# Patient Record
Sex: FEMALE | Race: WHITE | Employment: OTHER | URBAN - NONMETROPOLITAN AREA
[De-identification: names, ages, dates, MRNs, and addresses within clinical notes are randomized per-mention and may not be internally consistent; named-entity substitution may affect disease eponyms.]

---

## 2017-01-09 ENCOUNTER — OFFICE VISIT (OUTPATIENT)
Dept: FAMILY MEDICINE CLINIC | Age: 82
End: 2017-01-09

## 2017-01-09 ENCOUNTER — CARE COORDINATOR VISIT (OUTPATIENT)
Dept: FAMILY MEDICINE CLINIC | Age: 82
End: 2017-01-09

## 2017-01-09 VITALS
BODY MASS INDEX: 35.49 KG/M2 | DIASTOLIC BLOOD PRESSURE: 80 MMHG | WEIGHT: 213 LBS | HEART RATE: 95 BPM | OXYGEN SATURATION: 97 % | SYSTOLIC BLOOD PRESSURE: 128 MMHG | HEIGHT: 65 IN

## 2017-01-09 DIAGNOSIS — G62.0 CHEMOTHERAPY-INDUCED NEUROPATHY (HCC): ICD-10-CM

## 2017-01-09 DIAGNOSIS — R73.9 HYPERGLYCEMIA: ICD-10-CM

## 2017-01-09 DIAGNOSIS — D89.89: ICD-10-CM

## 2017-01-09 DIAGNOSIS — T45.1X5A CHEMOTHERAPY-INDUCED NEUROPATHY (HCC): ICD-10-CM

## 2017-01-09 DIAGNOSIS — J44.9 CHRONIC OBSTRUCTIVE PULMONARY DISEASE, UNSPECIFIED COPD TYPE (HCC): ICD-10-CM

## 2017-01-09 DIAGNOSIS — Z78.0 POSTMENOPAUSAL: ICD-10-CM

## 2017-01-09 DIAGNOSIS — D80.1 HYPOGAMMAGLOBULINEMIA, ACQUIRED (HCC): ICD-10-CM

## 2017-01-09 DIAGNOSIS — L57.0 ACTINIC KERATOSES: ICD-10-CM

## 2017-01-09 DIAGNOSIS — I10 ESSENTIAL HYPERTENSION: Primary | ICD-10-CM

## 2017-01-09 DIAGNOSIS — I48.0 PAROXYSMAL ATRIAL FIBRILLATION (HCC): ICD-10-CM

## 2017-01-09 PROCEDURE — 99213 OFFICE O/P EST LOW 20 MIN: CPT | Performed by: FAMILY MEDICINE

## 2017-01-09 PROCEDURE — 17110 DESTRUCTION B9 LES UP TO 14: CPT | Performed by: FAMILY MEDICINE

## 2017-01-09 ASSESSMENT — ENCOUNTER SYMPTOMS
ABDOMINAL PAIN: 0
SHORTNESS OF BREATH: 0

## 2017-01-20 DIAGNOSIS — I10 ESSENTIAL HYPERTENSION: ICD-10-CM

## 2017-01-20 DIAGNOSIS — R73.9 HYPERGLYCEMIA: ICD-10-CM

## 2017-01-20 LAB
ALT SERPL-CCNC: 12 U/L (ref 0–33)
AST SERPL-CCNC: 19 U/L (ref 0–35)
BASOPHILS ABSOLUTE: 0.1 K/UL (ref 0–0.2)
BASOPHILS RELATIVE PERCENT: 0.9 %
CHOLESTEROL, TOTAL: 245 MG/DL (ref 0–199)
EOSINOPHILS ABSOLUTE: 0.1 K/UL (ref 0–0.7)
EOSINOPHILS RELATIVE PERCENT: 1 %
HBA1C MFR BLD: 6 % (ref 4.8–5.9)
HCT VFR BLD CALC: 48.2 % (ref 37–47)
HDLC SERPL-MCNC: 48 MG/DL (ref 40–59)
HEMOGLOBIN: 15.2 G/DL (ref 12–16)
LDL CHOLESTEROL CALCULATED: 147 MG/DL (ref 0–129)
LYMPHOCYTES ABSOLUTE: 2.6 K/UL (ref 1–4.8)
LYMPHOCYTES RELATIVE PERCENT: 23.1 %
MCH RBC QN AUTO: 24.2 PG (ref 27–31.3)
MCHC RBC AUTO-ENTMCNC: 31.5 % (ref 33–37)
MCV RBC AUTO: 76.6 FL (ref 82–100)
MONOCYTES ABSOLUTE: 0.8 K/UL (ref 0.2–0.8)
MONOCYTES RELATIVE PERCENT: 7.4 %
NEUTROPHILS ABSOLUTE: 7.5 K/UL (ref 1.4–6.5)
NEUTROPHILS RELATIVE PERCENT: 67.6 %
PDW BLD-RTO: 17.8 % (ref 11.5–14.5)
PLATELET # BLD: 243 K/UL (ref 130–400)
RBC # BLD: 6.3 M/UL (ref 4.2–5.4)
TRIGL SERPL-MCNC: 248 MG/DL (ref 0–200)
WBC # BLD: 11.1 K/UL (ref 4.8–10.8)

## 2017-01-25 ENCOUNTER — HOSPITAL ENCOUNTER (OUTPATIENT)
Dept: WOMENS IMAGING | Age: 82
Discharge: HOME | End: 2017-01-25
Payer: MEDICARE

## 2017-01-25 DIAGNOSIS — Z78.0 POSTMENOPAUSAL: ICD-10-CM

## 2017-01-25 PROCEDURE — 77080 DXA BONE DENSITY AXIAL: CPT

## 2017-02-06 ENCOUNTER — CARE COORDINATION (OUTPATIENT)
Dept: FAMILY MEDICINE CLINIC | Age: 82
End: 2017-02-06

## 2017-03-16 ENCOUNTER — TELEPHONE (OUTPATIENT)
Dept: FAMILY MEDICINE CLINIC | Age: 82
End: 2017-03-16

## 2017-03-16 ENCOUNTER — APPOINTMENT (OUTPATIENT)
Dept: GENERAL RADIOLOGY | Age: 82
End: 2017-03-16
Payer: MEDICARE

## 2017-03-16 ENCOUNTER — APPOINTMENT (OUTPATIENT)
Dept: ULTRASOUND IMAGING | Age: 82
End: 2017-03-16
Payer: MEDICARE

## 2017-03-16 ENCOUNTER — HOSPITAL ENCOUNTER (EMERGENCY)
Age: 82
Discharge: HOME | End: 2017-03-16
Payer: MEDICARE

## 2017-03-16 VITALS
RESPIRATION RATE: 18 BRPM | OXYGEN SATURATION: 98 % | HEART RATE: 91 BPM | SYSTOLIC BLOOD PRESSURE: 121 MMHG | DIASTOLIC BLOOD PRESSURE: 63 MMHG | TEMPERATURE: 98 F

## 2017-03-16 DIAGNOSIS — M16.12 OSTEOARTHRITIS OF LEFT HIP, UNSPECIFIED OSTEOARTHRITIS TYPE: ICD-10-CM

## 2017-03-16 DIAGNOSIS — M25.552 HIP PAIN, ACUTE, LEFT: Primary | ICD-10-CM

## 2017-03-16 PROCEDURE — 96372 THER/PROPH/DIAG INJ SC/IM: CPT

## 2017-03-16 PROCEDURE — 93971 EXTREMITY STUDY: CPT

## 2017-03-16 PROCEDURE — 99284 EMERGENCY DEPT VISIT MOD MDM: CPT

## 2017-03-16 PROCEDURE — 73502 X-RAY EXAM HIP UNI 2-3 VIEWS: CPT

## 2017-03-16 PROCEDURE — 6360000002 HC RX W HCPCS: Performed by: PHYSICIAN ASSISTANT

## 2017-03-16 PROCEDURE — 72110 X-RAY EXAM L-2 SPINE 4/>VWS: CPT

## 2017-03-16 PROCEDURE — 6370000000 HC RX 637 (ALT 250 FOR IP): Performed by: PHYSICIAN ASSISTANT

## 2017-03-16 RX ORDER — OXYCODONE HYDROCHLORIDE AND ACETAMINOPHEN 5; 325 MG/1; MG/1
1 TABLET ORAL ONCE
Status: COMPLETED | OUTPATIENT
Start: 2017-03-16 | End: 2017-03-16

## 2017-03-16 RX ORDER — PREDNISONE 50 MG/1
50 TABLET ORAL DAILY
Qty: 5 TABLET | Refills: 0 | Status: SHIPPED | OUTPATIENT
Start: 2017-03-16 | End: 2017-03-21

## 2017-03-16 RX ORDER — DEXAMETHASONE SODIUM PHOSPHATE 10 MG/ML
10 INJECTION, SOLUTION INTRAMUSCULAR; INTRAVENOUS ONCE
Status: COMPLETED | OUTPATIENT
Start: 2017-03-16 | End: 2017-03-16

## 2017-03-16 RX ORDER — ONDANSETRON 2 MG/ML
4 INJECTION INTRAMUSCULAR; INTRAVENOUS ONCE
Status: DISCONTINUED | OUTPATIENT
Start: 2017-03-16 | End: 2017-03-16

## 2017-03-16 RX ADMIN — OXYCODONE HYDROCHLORIDE AND ACETAMINOPHEN 1 TABLET: 5; 325 TABLET ORAL at 19:56

## 2017-03-16 RX ADMIN — DEXAMETHASONE SODIUM PHOSPHATE 10 MG: 10 INJECTION INTRAMUSCULAR; INTRAVENOUS at 19:56

## 2017-03-16 ASSESSMENT — ENCOUNTER SYMPTOMS
CHOKING: 0
CONSTIPATION: 0
SORE THROAT: 0
EYE DISCHARGE: 0
DIARRHEA: 0
VOMITING: 0
SHORTNESS OF BREATH: 0
BACK PAIN: 1
ABDOMINAL DISTENTION: 0
COLOR CHANGE: 0
ABDOMINAL PAIN: 0
COUGH: 0
NAUSEA: 0
RHINORRHEA: 0

## 2017-03-16 ASSESSMENT — PAIN DESCRIPTION - LOCATION: LOCATION: HIP

## 2017-03-16 ASSESSMENT — PAIN DESCRIPTION - ORIENTATION: ORIENTATION: LEFT

## 2017-03-16 ASSESSMENT — PAIN DESCRIPTION - PROGRESSION: CLINICAL_PROGRESSION: GRADUALLY WORSENING

## 2017-03-16 ASSESSMENT — PAIN DESCRIPTION - PAIN TYPE: TYPE: ACUTE PAIN

## 2017-03-16 ASSESSMENT — PAIN SCALES - GENERAL
PAINLEVEL_OUTOF10: 10
PAINLEVEL_OUTOF10: 10

## 2017-03-16 ASSESSMENT — PAIN DESCRIPTION - DESCRIPTORS: DESCRIPTORS: ACHING

## 2017-03-20 ENCOUNTER — CARE COORDINATION (OUTPATIENT)
Dept: FAMILY MEDICINE CLINIC | Age: 82
End: 2017-03-20

## 2017-03-20 DIAGNOSIS — L30.4 INTERTRIGO: ICD-10-CM

## 2017-03-20 RX ORDER — NYSTATIN 100000 [USP'U]/G
POWDER TOPICAL
Qty: 120 G | Refills: 3 | Status: SHIPPED | OUTPATIENT
Start: 2017-03-20 | End: 2017-03-30

## 2017-03-23 ENCOUNTER — CARE COORDINATION (OUTPATIENT)
Dept: FAMILY MEDICINE CLINIC | Age: 82
End: 2017-03-23

## 2017-03-30 ENCOUNTER — OFFICE VISIT (OUTPATIENT)
Dept: FAMILY MEDICINE CLINIC | Age: 82
End: 2017-03-30

## 2017-03-30 VITALS
DIASTOLIC BLOOD PRESSURE: 84 MMHG | HEIGHT: 65 IN | OXYGEN SATURATION: 96 % | BODY MASS INDEX: 34.82 KG/M2 | WEIGHT: 209 LBS | HEART RATE: 56 BPM | SYSTOLIC BLOOD PRESSURE: 120 MMHG

## 2017-03-30 DIAGNOSIS — J44.1 COPD EXACERBATION (HCC): ICD-10-CM

## 2017-03-30 DIAGNOSIS — J18.9 PNEUMONIA DUE TO ORGANISM: Primary | ICD-10-CM

## 2017-03-30 PROCEDURE — 99213 OFFICE O/P EST LOW 20 MIN: CPT | Performed by: FAMILY MEDICINE

## 2017-03-30 RX ORDER — ALPRAZOLAM 0.25 MG/1
0.25 TABLET ORAL
Status: CANCELLED | OUTPATIENT
Start: 2017-03-30

## 2017-03-30 RX ORDER — METHYLPREDNISOLONE 4 MG/1
TABLET ORAL
Qty: 1 KIT | Refills: 0 | Status: SHIPPED | OUTPATIENT
Start: 2017-03-30 | End: 2017-04-13

## 2017-03-30 RX ORDER — CLARITHROMYCIN 500 MG/1
500 TABLET, COATED ORAL 2 TIMES DAILY
Qty: 14 TABLET | Refills: 0 | Status: SHIPPED | OUTPATIENT
Start: 2017-03-30 | End: 2017-04-06

## 2017-04-03 ENCOUNTER — CARE COORDINATION (OUTPATIENT)
Dept: FAMILY MEDICINE CLINIC | Age: 82
End: 2017-04-03

## 2017-04-08 DIAGNOSIS — J20.9 ACUTE BRONCHITIS, UNSPECIFIED ORGANISM: ICD-10-CM

## 2017-04-11 RX ORDER — PROMETHAZINE HYDROCHLORIDE AND CODEINE PHOSPHATE 6.25; 1 MG/5ML; MG/5ML
SYRUP ORAL
Qty: 200 ML | Refills: 0 | Status: ON HOLD | OUTPATIENT
Start: 2017-04-11 | End: 2017-12-08 | Stop reason: HOSPADM

## 2017-04-13 ENCOUNTER — CARE COORDINATOR VISIT (OUTPATIENT)
Dept: FAMILY MEDICINE CLINIC | Age: 82
End: 2017-04-13

## 2017-04-13 ENCOUNTER — OFFICE VISIT (OUTPATIENT)
Dept: FAMILY MEDICINE CLINIC | Age: 82
End: 2017-04-13

## 2017-04-13 VITALS
TEMPERATURE: 98.1 F | SYSTOLIC BLOOD PRESSURE: 120 MMHG | HEIGHT: 65 IN | DIASTOLIC BLOOD PRESSURE: 60 MMHG | HEART RATE: 86 BPM | WEIGHT: 216.8 LBS | BODY MASS INDEX: 36.12 KG/M2 | OXYGEN SATURATION: 92 %

## 2017-04-13 DIAGNOSIS — Z23 NEED FOR PNEUMOCOCCAL VACCINATION: ICD-10-CM

## 2017-04-13 DIAGNOSIS — J44.9 CHRONIC OBSTRUCTIVE PULMONARY DISEASE, UNSPECIFIED COPD TYPE (HCC): Primary | ICD-10-CM

## 2017-04-13 PROCEDURE — 99213 OFFICE O/P EST LOW 20 MIN: CPT | Performed by: FAMILY MEDICINE

## 2017-04-13 PROCEDURE — G0009 ADMIN PNEUMOCOCCAL VACCINE: HCPCS | Performed by: FAMILY MEDICINE

## 2017-04-13 PROCEDURE — 90670 PCV13 VACCINE IM: CPT | Performed by: FAMILY MEDICINE

## 2017-04-13 RX ORDER — GUAIFENESIN AND DEXTROMETHORPHAN HYDROBROMIDE 600; 30 MG/1; MG/1
1 TABLET, EXTENDED RELEASE ORAL 2 TIMES DAILY
Qty: 28 TABLET | Refills: 1 | Status: ON HOLD | OUTPATIENT
Start: 2017-04-13 | End: 2017-12-08 | Stop reason: HOSPADM

## 2017-04-13 ASSESSMENT — ENCOUNTER SYMPTOMS
SHORTNESS OF BREATH: 0
ABDOMINAL PAIN: 0

## 2017-04-24 DIAGNOSIS — J20.9 ACUTE BRONCHITIS, UNSPECIFIED ORGANISM: ICD-10-CM

## 2017-04-24 RX ORDER — PROMETHAZINE HYDROCHLORIDE AND CODEINE PHOSPHATE 6.25; 1 MG/5ML; MG/5ML
SYRUP ORAL
Qty: 180 ML | Refills: 0 | Status: CANCELLED | OUTPATIENT
Start: 2017-04-24

## 2017-04-26 RX ORDER — GUAIFENESIN AND DEXTROMETHORPHAN HYDROBROMIDE 600; 30 MG/1; MG/1
1 TABLET, EXTENDED RELEASE ORAL 2 TIMES DAILY PRN
Qty: 28 TABLET | Refills: 1 | Status: SHIPPED | OUTPATIENT
Start: 2017-04-26 | End: 2019-01-01

## 2017-05-30 RX ORDER — FUROSEMIDE 20 MG/1
TABLET ORAL
Qty: 28 TABLET | Refills: 0 | Status: SHIPPED | OUTPATIENT
Start: 2017-05-30 | End: 2017-05-30 | Stop reason: SDUPTHER

## 2017-05-30 RX ORDER — FUROSEMIDE 20 MG/1
TABLET ORAL
Qty: 180 TABLET | Refills: 3 | Status: SHIPPED | OUTPATIENT
Start: 2017-05-30 | End: 2017-06-14

## 2017-05-30 RX ORDER — FUROSEMIDE 20 MG/1
TABLET ORAL
Qty: 14 TABLET | Refills: 0 | Status: SHIPPED | OUTPATIENT
Start: 2017-05-30 | End: 2017-05-30

## 2017-06-21 RX ORDER — ESOMEPRAZOLE MAGNESIUM 40 MG/1
CAPSULE, DELAYED RELEASE ORAL
Qty: 30 CAPSULE | Refills: 11 | Status: SHIPPED | OUTPATIENT
Start: 2017-06-21 | End: 2017-08-10 | Stop reason: SDUPTHER

## 2017-06-27 RX ORDER — RIVAROXABAN 20 MG/1
TABLET, FILM COATED ORAL
Qty: 90 TABLET | Refills: 0 | Status: SHIPPED | OUTPATIENT
Start: 2017-06-27 | End: 2017-11-07 | Stop reason: SDUPTHER

## 2017-07-05 ENCOUNTER — CARE COORDINATION (OUTPATIENT)
Dept: FAMILY MEDICINE CLINIC | Age: 82
End: 2017-07-05

## 2017-08-08 ENCOUNTER — CARE COORDINATION (OUTPATIENT)
Dept: FAMILY MEDICINE CLINIC | Age: 82
End: 2017-08-08

## 2017-08-10 ENCOUNTER — OFFICE VISIT (OUTPATIENT)
Dept: FAMILY MEDICINE CLINIC | Age: 82
End: 2017-08-10

## 2017-08-10 VITALS
OXYGEN SATURATION: 95 % | BODY MASS INDEX: 36.32 KG/M2 | HEART RATE: 80 BPM | SYSTOLIC BLOOD PRESSURE: 138 MMHG | HEIGHT: 65 IN | DIASTOLIC BLOOD PRESSURE: 72 MMHG | WEIGHT: 218 LBS

## 2017-08-10 DIAGNOSIS — R73.9 HYPERGLYCEMIA: ICD-10-CM

## 2017-08-10 DIAGNOSIS — I10 ESSENTIAL HYPERTENSION: Primary | ICD-10-CM

## 2017-08-10 DIAGNOSIS — R73.03 PREDIABETES: ICD-10-CM

## 2017-08-10 DIAGNOSIS — K21.9 GASTROESOPHAGEAL REFLUX DISEASE, ESOPHAGITIS PRESENCE NOT SPECIFIED: ICD-10-CM

## 2017-08-10 DIAGNOSIS — J44.9 CHRONIC OBSTRUCTIVE PULMONARY DISEASE, UNSPECIFIED COPD TYPE (HCC): ICD-10-CM

## 2017-08-10 DIAGNOSIS — L30.4 INTERTRIGO: ICD-10-CM

## 2017-08-10 DIAGNOSIS — M67.911 TENDINOPATHY OF RIGHT SHOULDER: ICD-10-CM

## 2017-08-10 DIAGNOSIS — M47.817 OSTEOARTHRITIS OF LUMBOSACRAL SPINE WITHOUT MYELOPATHY: ICD-10-CM

## 2017-08-10 PROBLEM — E11.9 DIABETES (HCC): Status: ACTIVE | Noted: 2017-08-01

## 2017-08-10 LAB — HBA1C MFR BLD: 7.2 % (ref 4.8–5.9)

## 2017-08-10 PROCEDURE — 99213 OFFICE O/P EST LOW 20 MIN: CPT | Performed by: FAMILY MEDICINE

## 2017-08-10 RX ORDER — NYSTATIN 100000 U/G
CREAM TOPICAL
Qty: 45 G | Refills: 5 | Status: SHIPPED | OUTPATIENT
Start: 2017-08-10 | End: 2018-02-01 | Stop reason: ALTCHOICE

## 2017-08-10 RX ORDER — ESOMEPRAZOLE MAGNESIUM 40 MG/1
CAPSULE, DELAYED RELEASE ORAL
Qty: 30 CAPSULE | Refills: 11 | Status: SHIPPED | OUTPATIENT
Start: 2017-08-10 | End: 2018-08-29 | Stop reason: SDUPTHER

## 2017-08-10 ASSESSMENT — ENCOUNTER SYMPTOMS
ABDOMINAL PAIN: 0
SHORTNESS OF BREATH: 0

## 2017-08-12 ENCOUNTER — TELEPHONE (OUTPATIENT)
Dept: FAMILY MEDICINE CLINIC | Age: 82
End: 2017-08-12

## 2017-08-28 ENCOUNTER — TELEPHONE (OUTPATIENT)
Dept: FAMILY MEDICINE CLINIC | Age: 82
End: 2017-08-28

## 2017-08-30 ENCOUNTER — TELEPHONE (OUTPATIENT)
Dept: CARDIOLOGY | Age: 82
End: 2017-08-30

## 2017-09-06 ENCOUNTER — CARE COORDINATION (OUTPATIENT)
Dept: FAMILY MEDICINE CLINIC | Age: 82
End: 2017-09-06

## 2017-11-20 ENCOUNTER — CARE COORDINATION (OUTPATIENT)
Dept: CARE COORDINATION | Age: 82
End: 2017-11-20

## 2017-11-30 ENCOUNTER — TELEPHONE (OUTPATIENT)
Dept: FAMILY MEDICINE CLINIC | Age: 82
End: 2017-11-30

## 2017-11-30 DIAGNOSIS — F51.01 PRIMARY INSOMNIA: Primary | ICD-10-CM

## 2017-11-30 RX ORDER — CLONAZEPAM 0.5 MG/1
0.5 TABLET ORAL NIGHTLY PRN
Qty: 30 TABLET | Refills: 0 | Status: SHIPPED | OUTPATIENT
Start: 2017-11-30 | End: 2018-01-26 | Stop reason: SDUPTHER

## 2017-11-30 NOTE — TELEPHONE ENCOUNTER
Pt was in palliative care was written a script for clonazepam 0.5 mg tab quant 30 one tab at bedtime daily in place of the [place]. Pt is no longer in palliative care pt is in need of refill, has an appointment with you on 12/12/17 would like just enough to get her to her appointment to discuss this with you. Please advise please send GE/V.

## 2017-12-06 ENCOUNTER — APPOINTMENT (OUTPATIENT)
Dept: GENERAL RADIOLOGY | Age: 82
DRG: 195 | End: 2017-12-06
Payer: MEDICARE

## 2017-12-06 ENCOUNTER — HOSPITAL ENCOUNTER (INPATIENT)
Age: 82
LOS: 1 days | Discharge: HOME HEALTH | DRG: 195 | End: 2017-12-08
Attending: INTERNAL MEDICINE | Admitting: INTERNAL MEDICINE
Payer: MEDICARE

## 2017-12-06 DIAGNOSIS — J69.0: ICD-10-CM

## 2017-12-06 DIAGNOSIS — D72.829 LEUKOCYTOSIS, UNSPECIFIED TYPE: ICD-10-CM

## 2017-12-06 DIAGNOSIS — J44.1 COPD EXACERBATION (HCC): Primary | ICD-10-CM

## 2017-12-06 PROBLEM — J18.9 PNEUMONIA: Status: ACTIVE | Noted: 2017-12-06

## 2017-12-06 LAB
ALBUMIN SERPL-MCNC: 3.3 G/DL (ref 3.9–4.9)
ALP BLD-CCNC: 85 U/L (ref 40–130)
ALT SERPL-CCNC: 12 U/L (ref 0–33)
ANION GAP SERPL CALCULATED.3IONS-SCNC: 14 MEQ/L (ref 7–13)
AST SERPL-CCNC: 18 U/L (ref 0–35)
BASOPHILS ABSOLUTE: 0.1 K/UL (ref 0–0.2)
BASOPHILS RELATIVE PERCENT: 0.5 %
BILIRUB SERPL-MCNC: 0.8 MG/DL (ref 0–1.2)
BUN BLDV-MCNC: 10 MG/DL (ref 8–23)
CALCIUM SERPL-MCNC: 8.4 MG/DL (ref 8.6–10.2)
CHLORIDE BLD-SCNC: 95 MEQ/L (ref 98–107)
CO2: 24 MEQ/L (ref 22–29)
CREAT SERPL-MCNC: 0.72 MG/DL (ref 0.5–0.9)
EKG ATRIAL RATE: 72 BPM
EKG Q-T INTERVAL: 368 MS
EKG QRS DURATION: 78 MS
EKG QTC CALCULATION (BAZETT): 440 MS
EKG R AXIS: 8 DEGREES
EKG T AXIS: -12 DEGREES
EKG VENTRICULAR RATE: 86 BPM
EOSINOPHILS ABSOLUTE: 0 K/UL (ref 0–0.7)
EOSINOPHILS RELATIVE PERCENT: 0 %
GFR AFRICAN AMERICAN: >60
GFR NON-AFRICAN AMERICAN: >60
GLOBULIN: 2.4 G/DL (ref 2.3–3.5)
GLUCOSE BLD-MCNC: 200 MG/DL (ref 74–109)
HCT VFR BLD CALC: 44.4 % (ref 37–47)
HEMOGLOBIN: 14 G/DL (ref 12–16)
LYMPHOCYTES ABSOLUTE: 1.3 K/UL (ref 1–4.8)
LYMPHOCYTES RELATIVE PERCENT: 5.8 %
MCH RBC QN AUTO: 26 PG (ref 27–31.3)
MCHC RBC AUTO-ENTMCNC: 31.6 % (ref 33–37)
MCV RBC AUTO: 82.3 FL (ref 82–100)
MONOCYTES ABSOLUTE: 1.3 K/UL (ref 0.2–0.8)
MONOCYTES RELATIVE PERCENT: 6.1 %
NEUTROPHILS ABSOLUTE: 19.1 K/UL (ref 1.4–6.5)
NEUTROPHILS RELATIVE PERCENT: 87.6 %
PDW BLD-RTO: 15.7 % (ref 11.5–14.5)
PLATELET # BLD: 154 K/UL (ref 130–400)
POTASSIUM SERPL-SCNC: 4.2 MEQ/L (ref 3.5–5.1)
PRO-BNP: 1351 PG/ML
RBC # BLD: 5.4 M/UL (ref 4.2–5.4)
SODIUM BLD-SCNC: 133 MEQ/L (ref 132–144)
TOTAL PROTEIN: 5.7 G/DL (ref 6.4–8.1)
TROPONIN: <0.01 NG/ML (ref 0–0.01)
WBC # BLD: 21.8 K/UL (ref 4.8–10.8)

## 2017-12-06 PROCEDURE — 36415 COLL VENOUS BLD VENIPUNCTURE: CPT

## 2017-12-06 PROCEDURE — 6370000000 HC RX 637 (ALT 250 FOR IP): Performed by: PERSONAL EMERGENCY RESPONSE ATTENDANT

## 2017-12-06 PROCEDURE — G0378 HOSPITAL OBSERVATION PER HR: HCPCS

## 2017-12-06 PROCEDURE — 85025 COMPLETE CBC W/AUTO DIFF WBC: CPT

## 2017-12-06 PROCEDURE — 71020 XR CHEST STANDARD TWO VW: CPT

## 2017-12-06 PROCEDURE — 83880 ASSAY OF NATRIURETIC PEPTIDE: CPT

## 2017-12-06 PROCEDURE — 93005 ELECTROCARDIOGRAM TRACING: CPT

## 2017-12-06 PROCEDURE — 84484 ASSAY OF TROPONIN QUANT: CPT

## 2017-12-06 PROCEDURE — 96365 THER/PROPH/DIAG IV INF INIT: CPT

## 2017-12-06 PROCEDURE — 96367 TX/PROPH/DG ADDL SEQ IV INF: CPT

## 2017-12-06 PROCEDURE — 96375 TX/PRO/DX INJ NEW DRUG ADDON: CPT

## 2017-12-06 PROCEDURE — 96366 THER/PROPH/DIAG IV INF ADDON: CPT

## 2017-12-06 PROCEDURE — 6360000002 HC RX W HCPCS: Performed by: PERSONAL EMERGENCY RESPONSE ATTENDANT

## 2017-12-06 PROCEDURE — 80053 COMPREHEN METABOLIC PANEL: CPT

## 2017-12-06 PROCEDURE — 99285 EMERGENCY DEPT VISIT HI MDM: CPT

## 2017-12-06 PROCEDURE — 87040 BLOOD CULTURE FOR BACTERIA: CPT

## 2017-12-06 PROCEDURE — 2580000003 HC RX 258: Performed by: PERSONAL EMERGENCY RESPONSE ATTENDANT

## 2017-12-06 PROCEDURE — 94640 AIRWAY INHALATION TREATMENT: CPT

## 2017-12-06 RX ORDER — LEVOFLOXACIN 5 MG/ML
750 INJECTION, SOLUTION INTRAVENOUS ONCE
Status: COMPLETED | OUTPATIENT
Start: 2017-12-06 | End: 2017-12-07

## 2017-12-06 RX ORDER — METHYLPREDNISOLONE SODIUM SUCCINATE 125 MG/2ML
125 INJECTION, POWDER, LYOPHILIZED, FOR SOLUTION INTRAMUSCULAR; INTRAVENOUS ONCE
Status: COMPLETED | OUTPATIENT
Start: 2017-12-06 | End: 2017-12-06

## 2017-12-06 RX ORDER — IPRATROPIUM BROMIDE AND ALBUTEROL SULFATE 2.5; .5 MG/3ML; MG/3ML
1 SOLUTION RESPIRATORY (INHALATION) CONTINUOUS PRN
Status: DISCONTINUED | OUTPATIENT
Start: 2017-12-06 | End: 2017-12-07

## 2017-12-06 RX ORDER — ONDANSETRON 2 MG/ML
4 INJECTION INTRAMUSCULAR; INTRAVENOUS ONCE
Status: COMPLETED | OUTPATIENT
Start: 2017-12-06 | End: 2017-12-06

## 2017-12-06 RX ORDER — OXYCODONE HYDROCHLORIDE AND ACETAMINOPHEN 5; 325 MG/1; MG/1
1 TABLET ORAL ONCE
Status: DISCONTINUED | OUTPATIENT
Start: 2017-12-06 | End: 2017-12-06

## 2017-12-06 RX ORDER — MAGNESIUM SULFATE IN WATER 40 MG/ML
2 INJECTION, SOLUTION INTRAVENOUS ONCE
Status: COMPLETED | OUTPATIENT
Start: 2017-12-06 | End: 2017-12-06

## 2017-12-06 RX ADMIN — MAGNESIUM SULFATE HEPTAHYDRATE 2 G: 40 INJECTION, SOLUTION INTRAVENOUS at 21:59

## 2017-12-06 RX ADMIN — IPRATROPIUM BROMIDE AND ALBUTEROL SULFATE 1 AMPULE: .5; 3 SOLUTION RESPIRATORY (INHALATION) at 21:42

## 2017-12-06 RX ADMIN — HYDROMORPHONE HYDROCHLORIDE 0.5 MG: 1 INJECTION, SOLUTION INTRAMUSCULAR; INTRAVENOUS; SUBCUTANEOUS at 21:59

## 2017-12-06 RX ADMIN — METHYLPREDNISOLONE SODIUM SUCCINATE 125 MG: 125 INJECTION, POWDER, FOR SOLUTION INTRAMUSCULAR; INTRAVENOUS at 21:58

## 2017-12-06 RX ADMIN — ONDANSETRON 4 MG: 2 INJECTION INTRAMUSCULAR; INTRAVENOUS at 21:58

## 2017-12-06 RX ADMIN — AZITHROMYCIN MONOHYDRATE 500 MG: 500 INJECTION, POWDER, LYOPHILIZED, FOR SOLUTION INTRAVENOUS at 22:53

## 2017-12-06 ASSESSMENT — ENCOUNTER SYMPTOMS
SHORTNESS OF BREATH: 1
NAUSEA: 0
COLOR CHANGE: 0
BLOOD IN STOOL: 0
DIARRHEA: 0
RHINORRHEA: 0
VOMITING: 0
COUGH: 1
ABDOMINAL PAIN: 0

## 2017-12-06 ASSESSMENT — PAIN DESCRIPTION - PAIN TYPE: TYPE: CHRONIC PAIN

## 2017-12-06 ASSESSMENT — PAIN DESCRIPTION - ONSET: ONSET: ON-GOING

## 2017-12-06 ASSESSMENT — PAIN SCALES - GENERAL
PAINLEVEL_OUTOF10: 10
PAINLEVEL_OUTOF10: 10

## 2017-12-06 ASSESSMENT — PAIN DESCRIPTION - LOCATION: LOCATION: GENERALIZED

## 2017-12-06 ASSESSMENT — PAIN DESCRIPTION - FREQUENCY: FREQUENCY: CONTINUOUS

## 2017-12-06 ASSESSMENT — PAIN DESCRIPTION - DESCRIPTORS: DESCRIPTORS: ACHING

## 2017-12-06 ASSESSMENT — PULMONARY FUNCTION TESTS: PEFR_L/MIN: 190

## 2017-12-06 ASSESSMENT — PAIN DESCRIPTION - PROGRESSION: CLINICAL_PROGRESSION: NOT CHANGED

## 2017-12-06 NOTE — TELEPHONE ENCOUNTER
Pt did call back and is aware of what you said. Pt is now coughing up yellow and some red phlegm. Is there something you could call in to her pharmacy?

## 2017-12-07 LAB
ALBUMIN SERPL-MCNC: 3.1 G/DL (ref 3.9–4.9)
ALP BLD-CCNC: 88 U/L (ref 40–130)
ALT SERPL-CCNC: 13 U/L (ref 0–33)
ANION GAP SERPL CALCULATED.3IONS-SCNC: 13 MEQ/L (ref 7–13)
AST SERPL-CCNC: 18 U/L (ref 0–35)
BASOPHILS ABSOLUTE: 0 K/UL (ref 0–0.2)
BASOPHILS RELATIVE PERCENT: 0.2 %
BILIRUB SERPL-MCNC: 0.7 MG/DL (ref 0–1.2)
BUN BLDV-MCNC: 14 MG/DL (ref 8–23)
CALCIUM SERPL-MCNC: 8.7 MG/DL (ref 8.6–10.2)
CHLORIDE BLD-SCNC: 93 MEQ/L (ref 98–107)
CO2: 27 MEQ/L (ref 22–29)
CREAT SERPL-MCNC: 0.93 MG/DL (ref 0.5–0.9)
EOSINOPHILS ABSOLUTE: 0 K/UL (ref 0–0.7)
EOSINOPHILS RELATIVE PERCENT: 0 %
GFR AFRICAN AMERICAN: >60
GFR NON-AFRICAN AMERICAN: 57.3
GLOBULIN: 2.6 G/DL (ref 2.3–3.5)
GLUCOSE BLD-MCNC: 291 MG/DL (ref 60–115)
GLUCOSE BLD-MCNC: 317 MG/DL (ref 60–115)
GLUCOSE BLD-MCNC: 326 MG/DL (ref 74–109)
GLUCOSE BLD-MCNC: 376 MG/DL (ref 60–115)
GLUCOSE BLD-MCNC: 387 MG/DL (ref 60–115)
HCT VFR BLD CALC: 43.2 % (ref 37–47)
HEMOGLOBIN: 13.8 G/DL (ref 12–16)
LV EF: 60 %
LVEF MODALITY: NORMAL
LYMPHOCYTES ABSOLUTE: 0.8 K/UL (ref 1–4.8)
LYMPHOCYTES RELATIVE PERCENT: 4.3 %
MCH RBC QN AUTO: 26.7 PG (ref 27–31.3)
MCHC RBC AUTO-ENTMCNC: 32 % (ref 33–37)
MCV RBC AUTO: 83.3 FL (ref 82–100)
MONOCYTES ABSOLUTE: 0.3 K/UL (ref 0.2–0.8)
MONOCYTES RELATIVE PERCENT: 1.5 %
NEUTROPHILS ABSOLUTE: 17.2 K/UL (ref 1.4–6.5)
NEUTROPHILS RELATIVE PERCENT: 94 %
PDW BLD-RTO: 15.6 % (ref 11.5–14.5)
PERFORMED ON: ABNORMAL
PLATELET # BLD: 137 K/UL (ref 130–400)
POTASSIUM SERPL-SCNC: 4.8 MEQ/L (ref 3.5–5.1)
RBC # BLD: 5.19 M/UL (ref 4.2–5.4)
SODIUM BLD-SCNC: 133 MEQ/L (ref 132–144)
TOTAL PROTEIN: 5.7 G/DL (ref 6.4–8.1)
WBC # BLD: 18.3 K/UL (ref 4.8–10.8)

## 2017-12-07 PROCEDURE — 94640 AIRWAY INHALATION TREATMENT: CPT

## 2017-12-07 PROCEDURE — 93306 TTE W/DOPPLER COMPLETE: CPT

## 2017-12-07 PROCEDURE — 2580000003 HC RX 258: Performed by: INTERNAL MEDICINE

## 2017-12-07 PROCEDURE — 80053 COMPREHEN METABOLIC PANEL: CPT

## 2017-12-07 PROCEDURE — 94664 DEMO&/EVAL PT USE INHALER: CPT

## 2017-12-07 PROCEDURE — 96366 THER/PROPH/DIAG IV INF ADDON: CPT

## 2017-12-07 PROCEDURE — 99222 1ST HOSP IP/OBS MODERATE 55: CPT | Performed by: INTERNAL MEDICINE

## 2017-12-07 PROCEDURE — 6370000000 HC RX 637 (ALT 250 FOR IP): Performed by: INTERNAL MEDICINE

## 2017-12-07 PROCEDURE — 2700000000 HC OXYGEN THERAPY PER DAY

## 2017-12-07 PROCEDURE — 96367 TX/PROPH/DG ADDL SEQ IV INF: CPT

## 2017-12-07 PROCEDURE — G0378 HOSPITAL OBSERVATION PER HR: HCPCS

## 2017-12-07 PROCEDURE — 6360000002 HC RX W HCPCS: Performed by: PERSONAL EMERGENCY RESPONSE ATTENDANT

## 2017-12-07 PROCEDURE — 1210000000 HC MED SURG R&B

## 2017-12-07 PROCEDURE — 85025 COMPLETE CBC W/AUTO DIFF WBC: CPT

## 2017-12-07 RX ORDER — IPRATROPIUM BROMIDE AND ALBUTEROL SULFATE 2.5; .5 MG/3ML; MG/3ML
1 SOLUTION RESPIRATORY (INHALATION) 3 TIMES DAILY
Status: DISCONTINUED | OUTPATIENT
Start: 2017-12-07 | End: 2017-12-08 | Stop reason: HOSPADM

## 2017-12-07 RX ORDER — ALBUTEROL SULFATE 2.5 MG/3ML
2.5 SOLUTION RESPIRATORY (INHALATION)
Status: DISCONTINUED | OUTPATIENT
Start: 2017-12-07 | End: 2017-12-08 | Stop reason: HOSPADM

## 2017-12-07 RX ORDER — LEVOFLOXACIN 5 MG/ML
750 INJECTION, SOLUTION INTRAVENOUS EVERY 24 HOURS
Status: DISCONTINUED | OUTPATIENT
Start: 2017-12-08 | End: 2017-12-08 | Stop reason: HOSPADM

## 2017-12-07 RX ORDER — ACETAMINOPHEN 325 MG/1
650 TABLET ORAL EVERY 4 HOURS PRN
Status: DISCONTINUED | OUTPATIENT
Start: 2017-12-07 | End: 2017-12-08 | Stop reason: HOSPADM

## 2017-12-07 RX ORDER — LISINOPRIL 5 MG/1
5 TABLET ORAL DAILY
Status: DISCONTINUED | OUTPATIENT
Start: 2017-12-07 | End: 2017-12-08 | Stop reason: HOSPADM

## 2017-12-07 RX ORDER — SUCRALFATE 1 G/1
1 TABLET ORAL
Status: DISCONTINUED | OUTPATIENT
Start: 2017-12-07 | End: 2017-12-08 | Stop reason: HOSPADM

## 2017-12-07 RX ORDER — OXYCODONE HYDROCHLORIDE AND ACETAMINOPHEN 5; 325 MG/1; MG/1
1 TABLET ORAL EVERY 8 HOURS PRN
Status: DISCONTINUED | OUTPATIENT
Start: 2017-12-07 | End: 2017-12-08 | Stop reason: HOSPADM

## 2017-12-07 RX ORDER — FLUTICASONE PROPIONATE 50 MCG
1 SPRAY, SUSPENSION (ML) NASAL 2 TIMES DAILY
Status: DISCONTINUED | OUTPATIENT
Start: 2017-12-07 | End: 2017-12-08 | Stop reason: HOSPADM

## 2017-12-07 RX ORDER — DEXTROSE MONOHYDRATE 50 MG/ML
100 INJECTION, SOLUTION INTRAVENOUS PRN
Status: DISCONTINUED | OUTPATIENT
Start: 2017-12-07 | End: 2017-12-08 | Stop reason: HOSPADM

## 2017-12-07 RX ORDER — FUROSEMIDE 20 MG/1
20 TABLET ORAL DAILY
Status: DISCONTINUED | OUTPATIENT
Start: 2017-12-07 | End: 2017-12-08 | Stop reason: HOSPADM

## 2017-12-07 RX ORDER — FAMOTIDINE 20 MG/1
20 TABLET, FILM COATED ORAL 2 TIMES DAILY
Status: DISCONTINUED | OUTPATIENT
Start: 2017-12-07 | End: 2017-12-08 | Stop reason: HOSPADM

## 2017-12-07 RX ORDER — DULOXETIN HYDROCHLORIDE 60 MG/1
60 CAPSULE, DELAYED RELEASE ORAL DAILY
Status: DISCONTINUED | OUTPATIENT
Start: 2017-12-07 | End: 2017-12-08 | Stop reason: HOSPADM

## 2017-12-07 RX ORDER — IPRATROPIUM BROMIDE AND ALBUTEROL SULFATE 2.5; .5 MG/3ML; MG/3ML
1 SOLUTION RESPIRATORY (INHALATION)
Status: DISCONTINUED | OUTPATIENT
Start: 2017-12-07 | End: 2017-12-07

## 2017-12-07 RX ORDER — CLONAZEPAM 0.5 MG/1
0.5 TABLET ORAL 2 TIMES DAILY
Status: DISCONTINUED | OUTPATIENT
Start: 2017-12-07 | End: 2017-12-08 | Stop reason: HOSPADM

## 2017-12-07 RX ORDER — ONDANSETRON 2 MG/ML
4 INJECTION INTRAMUSCULAR; INTRAVENOUS EVERY 6 HOURS PRN
Status: DISCONTINUED | OUTPATIENT
Start: 2017-12-07 | End: 2017-12-08 | Stop reason: HOSPADM

## 2017-12-07 RX ORDER — DEXTROSE MONOHYDRATE 25 G/50ML
12.5 INJECTION, SOLUTION INTRAVENOUS PRN
Status: DISCONTINUED | OUTPATIENT
Start: 2017-12-07 | End: 2017-12-08 | Stop reason: HOSPADM

## 2017-12-07 RX ORDER — DILTIAZEM HYDROCHLORIDE 180 MG/1
360 CAPSULE, COATED, EXTENDED RELEASE ORAL DAILY
Status: DISCONTINUED | OUTPATIENT
Start: 2017-12-07 | End: 2017-12-08 | Stop reason: HOSPADM

## 2017-12-07 RX ORDER — SODIUM CHLORIDE 0.9 % (FLUSH) 0.9 %
10 SYRINGE (ML) INJECTION PRN
Status: DISCONTINUED | OUTPATIENT
Start: 2017-12-07 | End: 2017-12-08 | Stop reason: HOSPADM

## 2017-12-07 RX ORDER — SODIUM CHLORIDE 0.9 % (FLUSH) 0.9 %
10 SYRINGE (ML) INJECTION EVERY 12 HOURS SCHEDULED
Status: DISCONTINUED | OUTPATIENT
Start: 2017-12-07 | End: 2017-12-08 | Stop reason: HOSPADM

## 2017-12-07 RX ORDER — NICOTINE POLACRILEX 4 MG
15 LOZENGE BUCCAL PRN
Status: DISCONTINUED | OUTPATIENT
Start: 2017-12-07 | End: 2017-12-08 | Stop reason: HOSPADM

## 2017-12-07 RX ADMIN — SUCRALFATE 1 G: 1 TABLET ORAL at 08:34

## 2017-12-07 RX ADMIN — RIVAROXABAN 20 MG: 20 TABLET, FILM COATED ORAL at 08:33

## 2017-12-07 RX ADMIN — SODIUM CHLORIDE, PRESERVATIVE FREE 10 ML: 5 INJECTION INTRAVENOUS at 08:35

## 2017-12-07 RX ADMIN — FUROSEMIDE 20 MG: 20 TABLET ORAL at 08:34

## 2017-12-07 RX ADMIN — IPRATROPIUM BROMIDE AND ALBUTEROL SULFATE 1 AMPULE: .5; 3 SOLUTION RESPIRATORY (INHALATION) at 16:07

## 2017-12-07 RX ADMIN — CLONAZEPAM 0.5 MG: 0.5 TABLET ORAL at 20:29

## 2017-12-07 RX ADMIN — ACETAMINOPHEN 650 MG: 325 TABLET, FILM COATED ORAL at 15:56

## 2017-12-07 RX ADMIN — FLUTICASONE PROPIONATE 1 SPRAY: 50 SPRAY, METERED NASAL at 20:30

## 2017-12-07 RX ADMIN — INSULIN LISPRO 8 UNITS: 100 INJECTION, SOLUTION INTRAVENOUS; SUBCUTANEOUS at 17:46

## 2017-12-07 RX ADMIN — DULOXETINE HYDROCHLORIDE 60 MG: 60 CAPSULE, DELAYED RELEASE ORAL at 08:34

## 2017-12-07 RX ADMIN — FAMOTIDINE 20 MG: 20 TABLET, FILM COATED ORAL at 08:34

## 2017-12-07 RX ADMIN — LISINOPRIL 5 MG: 5 TABLET ORAL at 08:34

## 2017-12-07 RX ADMIN — FAMOTIDINE 20 MG: 20 TABLET, FILM COATED ORAL at 20:29

## 2017-12-07 RX ADMIN — LEVOFLOXACIN 750 MG: 5 INJECTION, SOLUTION INTRAVENOUS at 01:23

## 2017-12-07 RX ADMIN — CLONAZEPAM 0.5 MG: 0.5 TABLET ORAL at 08:34

## 2017-12-07 RX ADMIN — OXYCODONE HYDROCHLORIDE AND ACETAMINOPHEN 1 TABLET: 5; 325 TABLET ORAL at 20:30

## 2017-12-07 RX ADMIN — SUCRALFATE 1 G: 1 TABLET ORAL at 20:29

## 2017-12-07 RX ADMIN — SUCRALFATE 1 G: 1 TABLET ORAL at 17:47

## 2017-12-07 RX ADMIN — DILTIAZEM HYDROCHLORIDE 360 MG: 180 CAPSULE, COATED, EXTENDED RELEASE ORAL at 08:34

## 2017-12-07 RX ADMIN — IPRATROPIUM BROMIDE AND ALBUTEROL SULFATE 1 AMPULE: .5; 3 SOLUTION RESPIRATORY (INHALATION) at 19:39

## 2017-12-07 RX ADMIN — INSULIN LISPRO 6 UNITS: 100 INJECTION, SOLUTION INTRAVENOUS; SUBCUTANEOUS at 09:04

## 2017-12-07 RX ADMIN — INSULIN LISPRO 10 UNITS: 100 INJECTION, SOLUTION INTRAVENOUS; SUBCUTANEOUS at 11:31

## 2017-12-07 RX ADMIN — SODIUM CHLORIDE, PRESERVATIVE FREE 10 ML: 5 INJECTION INTRAVENOUS at 01:23

## 2017-12-07 RX ADMIN — SUCRALFATE 1 G: 1 TABLET ORAL at 11:29

## 2017-12-07 RX ADMIN — IPRATROPIUM BROMIDE AND ALBUTEROL SULFATE 1 AMPULE: .5; 3 SOLUTION RESPIRATORY (INHALATION) at 07:44

## 2017-12-07 ASSESSMENT — PAIN SCALES - GENERAL
PAINLEVEL_OUTOF10: 7
PAINLEVEL_OUTOF10: 6

## 2017-12-07 NOTE — PROGRESS NOTES
cooperative. HEENT: Pupils equal, round, and reactive to light. Conjunctivae/corneas clear. Neck: Supple, with full range of motion. No jugular venous distention. Trachea midline. Respiratory:  Normal respiratory effort. Clear to auscultation, bilaterally without Rales/Wheezes/Rhonchi. Cardiovascular: Regular rate and rhythm with normal S1/S2 without murmurs, rubs or gallops. Abdomen: Soft, non-tender, non-distended with normal bowel sounds. Musculoskeletal: No clubbing, cyanosis or edema bilaterally. Full range of motion without deformity. Skin: Skin color, texture, turgor normal.  No rashes or lesions.   Neurologic: grossly non-focal.  Ext: 1 + edema bilaterally  Labs:     Recent Results (from the past 24 hour(s))   Comprehensive Metabolic Panel    Collection Time: 12/06/17  9:45 PM   Result Value Ref Range    Sodium 133 132 - 144 mEq/L    Potassium 4.2 3.5 - 5.1 mEq/L    Chloride 95 (L) 98 - 107 mEq/L    CO2 24 22 - 29 mEq/L    Anion Gap 14 (H) 7 - 13 mEq/L    Glucose 200 (H) 74 - 109 mg/dL    BUN 10 8 - 23 mg/dL    CREATININE 0.72 0.50 - 0.90 mg/dL    GFR Non-African American >60.0 >60    GFR  >60.0 >60    Calcium 8.4 (L) 8.6 - 10.2 mg/dL    Total Protein 5.7 (L) 6.4 - 8.1 g/dL    Alb 3.3 (L) 3.9 - 4.9 g/dL    Total Bilirubin 0.8 0.0 - 1.2 mg/dL    Alkaline Phosphatase 85 40 - 130 U/L    ALT 12 0 - 33 U/L    AST 18 0 - 35 U/L    Globulin 2.4 2.3 - 3.5 g/dL   Troponin    Collection Time: 12/06/17  9:45 PM   Result Value Ref Range    Troponin <0.010 0.000 - 0.010 ng/mL   Brain Natriuretic Peptide    Collection Time: 12/06/17  9:45 PM   Result Value Ref Range    Pro-BNP 1,351 pg/mL   CBC Auto Differential    Collection Time: 12/06/17  9:51 PM   Result Value Ref Range    WBC 21.8 (H) 4.8 - 10.8 K/uL    RBC 5.40 4.20 - 5.40 M/uL    Hemoglobin 14.0 12.0 - 16.0 g/dL    Hematocrit 44.4 37.0 - 47.0 %    MCV 82.3 82.0 - 100.0 fL    MCH 26.0 (L) 27.0 - 31.3 pg    MCHC 31.6 (L) 33.0 - 37.0 %    RDW 15.7 (H) 11.5 - 14.5 %    Platelets 043 799 - 912 K/uL    Neutrophils % 87.6 %    Lymphocytes % 5.8 %    Monocytes % 6.1 %    Eosinophils % 0.0 %    Basophils % 0.5 %    Neutrophils # 19.1 (H) 1.4 - 6.5 K/uL    Lymphocytes # 1.3 1.0 - 4.8 K/uL    Monocytes # 1.3 (H) 0.2 - 0.8 K/uL    Eosinophils # 0.0 0.0 - 0.7 K/uL    Basophils # 0.1 0.0 - 0.2 K/uL   EKG 12 Lead - Chest Pain    Collection Time: 12/06/17  9:53 PM   Result Value Ref Range    Ventricular Rate 86 BPM    Atrial Rate 72 BPM    QRS Duration 78 ms    Q-T Interval 368 ms    QTc Calculation (Bazett) 440 ms    R Axis 8 degrees    T Axis -12 degrees   Comprehensive metabolic panel    Collection Time: 12/07/17  6:44 AM   Result Value Ref Range    Sodium 133 132 - 144 mEq/L    Potassium 4.8 3.5 - 5.1 mEq/L    Chloride 93 (L) 98 - 107 mEq/L    CO2 27 22 - 29 mEq/L    Anion Gap 13 7 - 13 mEq/L    Glucose 326 (H) 74 - 109 mg/dL    BUN 14 8 - 23 mg/dL    CREATININE 0.93 (H) 0.50 - 0.90 mg/dL    GFR Non-African American 57.3 (L) >60    GFR  >60.0 >60    Calcium 8.7 8.6 - 10.2 mg/dL    Total Protein 5.7 (L) 6.4 - 8.1 g/dL    Alb 3.1 (L) 3.9 - 4.9 g/dL    Total Bilirubin 0.7 0.0 - 1.2 mg/dL    Alkaline Phosphatase 88 40 - 130 U/L    ALT 13 0 - 33 U/L    AST 18 0 - 35 U/L    Globulin 2.6 2.3 - 3.5 g/dL   CBC auto differential    Collection Time: 12/07/17  6:44 AM   Result Value Ref Range    WBC 18.3 (H) 4.8 - 10.8 K/uL    RBC 5.19 4.20 - 5.40 M/uL    Hemoglobin 13.8 12.0 - 16.0 g/dL    Hematocrit 43.2 37.0 - 47.0 %    MCV 83.3 82.0 - 100.0 fL    MCH 26.7 (L) 27.0 - 31.3 pg    MCHC 32.0 (L) 33.0 - 37.0 %    RDW 15.6 (H) 11.5 - 14.5 %    Platelets 561 734 - 190 K/uL    Neutrophils % 94.0 %    Lymphocytes % 4.3 %    Monocytes % 1.5 %    Eosinophils % 0.0 %    Basophils % 0.2 %    Neutrophils # 17.2 (H) 1.4 - 6.5 K/uL    Lymphocytes # 0.8 (L) 1.0 - 4.8 K/uL    Monocytes # 0.3 0.2 - 0.8 K/uL    Eosinophils # 0.0 0.0 - 0.7 K/uL    Basophils # 0.0 0.0 - 0.2 K/uL   POCT Glucose    Collection Time: 12/07/17  7:48 AM   Result Value Ref Range    POC Glucose 291 (H) 60 - 115 mg/dl    Performed on ACCU-CHEK            Assessment/Plan:  1. Bilateral Pneumonia- Rule out sepsis              Levaquin, pulmonary medicine consult              Chest x-ray showing infiltrates bilaterally              Elevated white count of 21.8-->18-               pulmonology consulted  2. Hyperglycemia              History of diabetes              Blood sugar of 200              sliding scale insulin and accuchecks  3.  Elevated proBNP-echo            4. Hypertension controlled-Lisinopril    Active Hospital Problems    Diagnosis Date Noted    Pneumonia [J18.9] 12/06/2017         DVT Prophylaxis: via lovenox  Diet: DIET LOW SODIUM 2 GM;  Code Status: Full Code        Electronically signed by Jaymie Crump MD on 12/7/2017 at 10:10 AM

## 2017-12-07 NOTE — FLOWSHEET NOTE
AM assessment completed. A&Ox3. VSS. Denies any pain/n/v at this time. Pt is C/O sweating. Fan requested. Pt states that she takes oxycontin at home in the afternoon and thinks that she may be sweaty because she hasn't taken it yet today. Asked pt to have family bring in prescription. Pt is denying any pain at this time. Lung sounds diminished with expiratory wheezes noted. Is on 3L NC which she states she wears at home PRN. Denies any other needs at this time. Call light within reach. Will continue to monitor.

## 2017-12-07 NOTE — PROGRESS NOTES
Pulmonary Disorder  (acute or chronic)  [x]   Severe or Chronic w/ Exacerbation  []     Surgical Status No [x]   Surgeries     General []   Surgery Lower []   Abdominal Thoracic or []   Upper Abdominal Thoracic with  PulmonaryDisorder  []     Chest X-ray Clear/Not  Ordered     []  Chronic Changes  Results Pending  [x]  Infiltrates, atelectasis, pleural effusion, or edema  []  Infiltrates in more than one lobe []  Infiltrate + Atelectasis, &/or pleural effusion  []    Respiratory Pattern Regular,  RR = 12-20 [x]  Increased,  RR = 21-25 []  LEVIN, irregular,  or RR = 26-30 []  Decreased FEV1  or RR = 31-35 []  Severe SOB, use  of accessory muscles, or RR ? 35  []    Mental Status Alert, oriented,  Cooperative [x]  Confused but Follows commands []  Lethargic or unable to follow commands []  Obtunded  []  Comatose  []    Breath Sounds Clear to  auscultation  []  Decreased unilaterally or  in bases only []  Decreased  bilaterally  []  Crackles or intermittent wheezes []  Wheezes [x]    Cough Strong, Spontan., & nonproductive [x]  Strong,  spontaneous, &  productive []  Weak,  Nonproductive []  Weak, productive or  with wheezes []  No spontaneous  cough or may require suctioning []    Level of Activity Ambulatory [x]  Ambulatory w/ Assist  []  Non-ambulatory []  Paraplegic []  Quadriplegic []    Total    Score:___8____     Triage Score:__4______      Tri       Triage:     1. (>20) Freq: Q3    2. (16-20) Freq: Q4   3. (11-15) Freq: QID & Albuterol Q2 PRN    4. (6-10) Freq: TID & Albuterol Q2 PRN    5. (0-5) Freq Q4prn

## 2017-12-07 NOTE — CONSULTS
4/15/2016    Statin intolerance     Tendinopathy of right shoulder     Tremor 2012    Warfarin anticoagulation        Past Surgical History:        Procedure Laterality Date     SECTION      FOOT SURGERY      HEMORRHOID SURGERY      Dr. Jair Barrett Left     ear basal cell    NERVE BLOCK Right 2016    [facility] JO[facility] PALACI[facility] MD  SUPRASCAPULAR NB PULSED RF    OTHER SURGICAL HISTORY  14    [facility] NERVE BLOCK MEDIAN BRANCH    VENA CAVA FILTER PLACEMENT         Social History:     reports that she quit smoking about 25 years ago. Her smoking use included Cigarettes. She quit after 43.00 years of use. She has never used smokeless tobacco. She reports that she drinks alcohol. She reports that she does not use drugs. Family History:       Problem Relation Age of Onset   Tyler Amen Cancer Mother     Kidney Disease Mother     Stroke Mother     Cancer Father     Arthritis Sister     Cancer Sister     Diabetes Sister     Arthritis Brother     Cancer Brother     Diabetes Brother     Heart Disease Brother        Allergies:  Bactrim; Doxycycline;  Fenofibrate; Lipitor; and Pcn [penicillins]        MEDICATIONS during current hospitalization:    Continuous Infusions:   dextrose         Scheduled Meds:   sodium chloride flush  10 mL Intravenous 2 times per day    famotidine  20 mg Oral BID    clonazePAM  0.5 mg Oral BID    diltiazem  360 mg Oral Daily    DULoxetine  60 mg Oral Daily    fluticasone  1 spray Nasal BID    furosemide  20 mg Oral Daily    lisinopril  5 mg Oral Daily    rivaroxaban  20 mg Oral Daily with breakfast    sucralfate  1 g Oral 4x Daily AC & HS    insulin lispro  0-12 Units Subcutaneous TID WC    insulin lispro  0-6 Units Subcutaneous Nightly    [START ON 2017] levofloxacin  750 mg Intravenous Q24H    ipratropium-albuterol  1 ampule Inhalation TID       PRN Meds:sodium chloride flush, acetaminophen, magnesium hydroxide, ondansetron, glucose, dextrose, glucagon (rDNA), dextrose, albuterol        REVIEW OF SYSTEMS:  As in history of present illness  Other 14 point review of system is negative. PHYSICAL EXAM:    Vitals:  /70   Pulse 83   Temp 97.5 °F (36.4 °C) (Oral)   Resp 18   Ht 5' 5\" (1.651 m)   Wt 222 lb 10.6 oz (101 kg)   SpO2 99%   BMI 37.05 kg/m²   General: Patient is  Alert, awake . comfortable in bed, No distress. Head: Atraumatic , Normocephalic   Eyes: PERRL. No sclera icterus. No conjunctival injection. No discharge   ENT: No nasal  discharge. Pharynx clear. Neck:  Trachea midline. No thyromegaly, no JVD, No cervical adenopathy. Chest : Bilaterally symmetrical ,Normal effort,  No accessory muscle use  Lung : Diminished breath sounds with few scattered rhonchi in the bases and occasional crackles in the left base   Heart[de-identified] Normal  rate. Regular rhythm. No mumur ,  Rub or gallop  ABD: Non-tender. Non-distended. No masses. No organmegaly. Normal bowel sounds. No hernia. EXT: No Pitting both leg , No Cyanosis No clubbing  Neuro: no focal weakness  Skin: Warm and dry. No erythema rash on exposed extremities. Data Review  Recent Labs      12/06/17   2151  12/07/17   0644   WBC  21.8*  18.3*   HGB  14.0  13.8   HCT  44.4  43.2   PLT  154  137      Recent Labs      12/06/17   2145  12/07/17   0644   NA  133  133   K  4.2  4.8   CL  95*  93*   CO2  24  27   BUN  10  14   CREATININE  0.72  0.93*   GLUCOSE  200*  326*       MV Settings: ABGs: No results for input(s): PHART, THT9WVC, PO2ART, YQM1RYI, BEART, E2RDYYTF, OPN1LUF in the last 72 hours. O2 Device: Nasal cannula  O2 Flow Rate (L/min): 2 L/min  Lab Results   Component Value Date    LACTA 1.1 01/11/2016    LACTA 2.6 02/26/2015    LACTA 1.6 03/13/2014       Radiology  Xr Chest Standard (2 Vw)    Result Date: 12/7/2017  EXAMINATION: XR CHEST STANDARD TWO VW CLINICAL HISTORY: Short of breath. Cough producing pink sputum.  COMPARISONS: March 30, 2017 FINDINGS: Frontal and lateral views the chest were obtained showing infiltrate and/or edema, left greater than right, centered on the hilum. A small volume of subpulmonic effusion is suspected on the left. Heart size is at upper limits of normal. The mediastinum is not widened or shifted. There is an unremarkable, unchanged venous access port in place from the right internal jugular vein, with its tip in the superior vena cava. Incidentally noted is advanced degenerative disease in both shoulders. There is no acute chest wall abnormality. CONCLUSION: INFILTRATE AND/OR EDEMA, LEFT GREATER THAN RIGHT. Assessment, plan:   1. Left lower lobe pneumonia seemed to be improving with antibiotic therapy  2.  History of asthma with mild exacerbation associated with lower respiratory tract infection  Continue current treatment as you're doing and obtain follow-up PA and lateral chest x-ray in a.m. we'll continue to follow        Electronically signed by Ynes Mckenna MD, FCCP on 12/7/2017 at 11:15 AM

## 2017-12-07 NOTE — PLAN OF CARE
Problem: Falls - Risk of  Goal: Absence of falls  Outcome: Ongoing  Fall precautions in place.     Problem: HH BREATHING-PNEUMONIA  Goal: Effective breathing pattern  Outcome: Ongoing  Assess lung sounds and breathing frequently

## 2017-12-07 NOTE — H&P
History and Physical    Patient's Name/Date of Birth: Georgiana Reaves / 3/3/1933 (89 y.o.)    Date: December 7, 2017     Chief Complaint: Fever, shortness of breath, cough    HPI:   80years old female with past medical history of A. Fib, hypertension, diabetes, COPD, CAD,cancer, presented to the ED because shortness of breath and fever. Patient said that for the past couple of weeks she has been having a cough. however, her symptoms did not get worse until yesterday. Patient mentioned that she was coughing so hard that she vomited. Unknown if she aspirated. She mentioned that she has been nauseous. She mentioned chest pain from coughing and possibly when her  tried to lift her up. She denied abdominal pain, urinary symptoms.     Past Medical History:   Diagnosis Date    A-fib [facility])     Actinic keratoses     Allergic rhinitis, cause unspecified 4/12/2012    Anxiety 4/12/2012    Arthritis of right shoulder region     Asthma     Candida esophagitis (HCC)     GI-Dr. Agnieszka Anderson    Chemotherapy-induced neuropathy (HCC)     Chronic pain syndrome     CKD (chronic kidney disease) stage 3, GFR 30-59 ml/min 9/29/2014    COPD [facility]ch[facility] obstructive [facility] disease) [facility] [address]     Depression     Diabetes ([facility] [address] 08/2017    Diaphoresis 4/15/2016    Dyslipidemia     unable to tolerate statins/tricor    Dysphagia     Fatigue     FH: CAD (coronary artery disease) 5/23/2012    GERD (gastroesophageal reflux disease)     History of DVT of lower extremity     HTN (hypertension)     Insomnia 4/12/2012    Intertrigo     Labral tear of shoulder     right    Lipoma     Low back pain 1/7/2015    Lower extremity edema 4/12/2012    Multiple myeloma (HCC)     Neuropathy (HCC)     hands and feet    Osteoarthritis of spine     PE (pulmonary embolism)     Prediabetes 9/17/2014    Right arm pain     Seizures (HCC)     Skin cancer, basal cell     [facility]uf[facility]     SOB (shortness of breath) 4/15/2016    Statin intolerance     Tendinopathy of right shoulder     Tremor 2012    Warfarin anticoagulation        Past Surgical History:   Procedure Laterality Date     SECTION      FOOT SURGERY      HEMORRHOID SURGERY  2014    Dr. Franki Leo Left     ear basal cell    NERVE BLOCK Right 2016    [facility] JOY PALAC[facility] MD  SUPRASCAPULAR NB PULSED RF    OTHER SURGICAL HISTORY  14    [facility] NERVE BLOCK MEDIAN BRANCH    VENA CAVA FILTER PLACEMENT         Prior to Admission medications    Medication Sig Start Date End Date Taking? Authorizing Provider   clonazePAM (KLONOPIN) 0.5 MG tablet Take 1 tablet by mouth nightly as needed (insomnia) .  17  Yes Traci Posadas MD   rivaroxaban (XARELTO) 20 MG TABS tablet TAKE 1 TABLET DAILY 17  Yes Rickie Bucio MD   potassium chloride (KLOR-CON M) 20 MEQ extended release tablet TAKE 1 TABLET DAILY 10/16/17  Yes Traci Posadas MD   nystatin (MYCOSTATIN) 125477 UNIT/GM cream Apply to affected area 3 times daily 8/10/17  Yes Traci Posadas MD   esomeprazole (NEXIUM) 40 MG delayed release capsule TAKE ONE CAPSULE BY MOUTH EVERY MORNING BEFORE BREAKFAST 8/10/17  Yes Traci Posadas MD   diclofenac sodium 1 % GEL Apply 2 g topically 2 times daily 8/10/17  Yes Traci Posadas MD   furosemide (LASIX) 20 MG tablet TAKE 1 TABLET TWICE A DAY 17  Yes Traci Posadas MD   Dext[facility]ethorphan-[facility]uaifenesin [facility])  MG TB12 Take 1 tablet by mouth 2 times daily as needed (cough) 17  Yes Traci Posadas MD   primidone (MYSOLINE) 50 MG tablet TAKE 1 TABLET NIGHTLY 17  Yes Traci Posadas MD   Dextromethorphan-Guaifenesin [facility] DM)  MG TB12 Take 1 tablet by mouth 2 times daily 17  Yes Traci Posadas MD   promethazine-[facility] [place] WITH CODEINE) 6.25-10 MG/5ML syrup take 1 teaspoon (5 mls) by mouth twice daily as needed for cough 17  Yes Laura Raring, CNP   lisinopril (PRINIVIL;ZESTRIL) 5 MG tablet TAKE 1 TABLET DAILY 1/18/17  Yes Barbie Das MD   SYMBICORT 160-4.5 MCG/ACT AERO USE 2 INHALATIONS TWICE A DAY 1/18/17  Yes Barbie Das MD   DULoxetine (CYMBALTA) 60 MG extended release capsule TAKE ONE CAPSULE BY MOUTH EVERY DAY 1/16/17  Yes Barbie Das MD   diltiazem (CARDIZEM CD) 360 MG extended release capsule TAKE 1 CAPSULE DAILY 1/10/17  Yes Barbie Das MD   sucralfate (CARAFATE) 1 GM tablet Take 1 g by mouth 4 times daily   Yes Historical Provider, MD   venlafaxine (EFFEXOR-XR) 75 MG XR capsule TAKE ONE CAPSULE BY MOUTH EVERY DAY 6/20/16  Yes Barbie Das MD   omeprazole (PRILOSEC) 40 MG capsule Take 1 capsule by mouth daily Take 2 pills daily 4/4/16  Yes Salma Guerrero CNP   lidocaine (LIDODERM) 5 % Place 1 patch onto the skin daily 12 hours on, 12 hours off. 1/28/16  Yes Barbie Das MD   prochlorperazine (COMPAZINE) 10 MG tablet Take 10 mg by mouth 10/16/15  Yes Historical Provider, MD   albuterol (PROVENTIL) (2.5 MG/3ML) 0.083% nebulizer solution Take 3 mLs by nebulization every 4 hours as needed for Wheezing 11/30/15  Yes Salma Guerrero CNP   loratadine (CLARITIN) 10 MG tablet Take 1 tablet by mouth daily 5/18/15  Yes Barbie Das MD   oxyCODONE 5 MG capsule Take 5 mg by mouth. 12/20/14  Yes Historical Provider, MD   ammonium lactate (LAC-HYDRIN) 12 % lotion Apply topically as needed. 12/23/14  Yes Barbie Das MD   clonazePAM (KLONOPIN) 0.5 MG tablet Take 1 tablet by mouth 2 times daily. 11/20/14  Yes Barbie Das MD   hydrocortisone (ANUSOL-HC) 25 MG suppository Place 1 suppository rectally 2 times daily as needed for Hemorrhoids. 8/27/14  Yes Barbie Das MD   Calcium Carbonate-Vitamin D (CALCIUM 600 + D PO) Take 1 tablet by mouth 2 times daily. Yes Historical Provider, MD   fluticasone (FLONASE) 50 MCG/ACT nasal spray 1 spray by Nasal route 2 times daily.      Yes Historical Provider, MD       Allergies   Allergen Reactions    Bactrim      Trouble sleeping    Doxycycline     external ears without lesions, oral pharynx with moist mucus membranes, tonsils without erythema or exudates, gums normal and good dentition. NECK:  Supple, symmetrical, trachea midline, no adenopathy, thyroid symmetric, not enlarged and no tenderness, skin normal  HEMATOLOGIC/LYMPHATICS:  no cervical lymphadenopathy and no supraclavicular lymphadenopathy  BACK:  Symmetric, no curvature, spinous processes are non-tender on palpation, paraspinous muscles are non-tender on palpation, no costal vertebral tenderness  LUNGS:  Mild increased work of breathing, rhonchi bilaterally  CARDIOVASCULAR:  Normal apical impulse, regular rate and rhythm, normal S1 and S2, no S3 or S4, and no murmur noted  ABDOMEN:  No scars, normal bowel sounds, soft, non-distended, non-tender, no masses palpated, no hepatosplenomegally  CHEST: no masses palpated, no axillary or supraclavicular adenopathy  MUSCULOSKELETAL:  There is no redness, warmth, or swelling of the joints. Full range of motion noted. Motor strength is 5 out of 5 all extremities bilaterally. Tone is normal.  NEUROLOGIC:  Awake, alert, oriented to name, place and time. Cranial nerves II-XII are grossly intact. Motor is 5 out of 5 bilaterally. Cerebellar finger to nose, heel to shin intact. Sensory is intact.   Babinski down going, Romberg negative, and gait is normal.  SKIN:  no bruising or bleeding, normal skin color, texture, turgor, no redness, warmth, or swelling, no rashes, no lesions, no abnormal moles, nails normal without discoloration or clubbing and no jaundice    Labs:  Recent Results (from the past 24 hour(s))   Comprehensive Metabolic Panel    Collection Time: 12/06/17  9:45 PM   Result Value Ref Range    Sodium 133 132 - 144 mEq/L    Potassium 4.2 3.5 - 5.1 mEq/L    Chloride 95 (L) 98 - 107 mEq/L    CO2 24 22 - 29 mEq/L    Anion Gap 14 (H) 7 - 13 mEq/L    Glucose 200 (H) 74 - 109 mg/dL    BUN 10 8 - 23 mg/dL    CREATININE 0.72 0.50 - 0.90 mg/dL    GFR Non- >60.0 >60    GFR  >60.0 >60    Calcium 8.4 (L) 8.6 - 10.2 mg/dL    Total Protein 5.7 (L) 6.4 - 8.1 g/dL    Alb 3.3 (L) 3.9 - 4.9 g/dL    Total Bilirubin 0.8 0.0 - 1.2 mg/dL    Alkaline Phosphatase 85 40 - 130 U/L    ALT 12 0 - 33 U/L    AST 18 0 - 35 U/L    Globulin 2.4 2.3 - 3.5 g/dL   Troponin    Collection Time: 12/06/17  9:45 PM   Result Value Ref Range    Troponin <0.010 0.000 - 0.010 ng/mL   Brain Natriuretic Peptide    Collection Time: 12/06/17  9:45 PM   Result Value Ref Range    Pro-BNP 1,351 pg/mL   CBC Auto Differential    Collection Time: 12/06/17  9:51 PM   Result Value Ref Range    WBC 21.8 (H) 4.8 - 10.8 K/uL    RBC 5.40 4.20 - 5.40 M/uL    Hemoglobin 14.0 12.0 - 16.0 g/dL    Hematocrit 44.4 37.0 - 47.0 %    MCV 82.3 82.0 - 100.0 fL    MCH 26.0 (L) 27.0 - 31.3 pg    MCHC 31.6 (L) 33.0 - 37.0 %    RDW 15.7 (H) 11.5 - 14.5 %    Platelets 148 260 - 939 K/uL    Neutrophils % 87.6 %    Lymphocytes % 5.8 %    Monocytes % 6.1 %    Eosinophils % 0.0 %    Basophils % 0.5 %    Neutrophils # 19.1 (H) 1.4 - 6.5 K/uL    Lymphocytes # 1.3 1.0 - 4.8 K/uL    Monocytes # 1.3 (H) 0.2 - 0.8 K/uL    Eosinophils # 0.0 0.0 - 0.7 K/uL    Basophils # 0.1 0.0 - 0.2 K/uL     Radiology:  XR CHEST STANDARD (2 VW)    (Results Pending)       Assessment/Plan:  1. Pneumonia- Rule out sepsis   Shortness of breath, cough, fever   Chest x-ray showing infiltrates bilaterally   Elevated white count of 21.8- no  Documented fever but subjective   We'll continue Levaquin   We'll consult pulmonology  2. Hyperglycemia   History of diabetes   Blood sugar of 200   We'll start insulin Sliding scale  3.  Elevated proBNP   Complaining of shortness of breath, lower extremity edema   No recent echo since 2014, we'll order echo    Electronically signed by Benjamin Campa MD on 12/7/17 at 1:01 AM

## 2017-12-07 NOTE — PLAN OF CARE
Problem: Falls - Risk of  Goal: Absence of falls  Outcome: Ongoing      Problem: HH BREATHING-PNEUMONIA  Goal: Pneumonia vaccine at discharge as needed  Outcome: Ongoing    Goal: Effective breathing pattern  Outcome: Ongoing

## 2017-12-07 NOTE — ED PROVIDER NOTES
[facility] ED  eMERGENCY dEPARTMENT eNCOUnter      Pt Name: Cesario Kumar  MRN: [id number]  [place] 3/3/1933  Date of evaluation: 12/6/2017  Provider: Yenny Sweet, [address]    Cesario Kumar is a 80 y.o. female presents to the emergency department with SOB. Pt was sent from urgent care for SOB. Patient states she began having increased shortness of breath this morning with blood tinged sputum.  states patient had a temperature last night of 100.1. She states she occasionally has intermittent chest pains midsternally radiating to her left axillary region. There is no lightheaded or dizziness, abdominal pain, nausea, vomiting, diarrhea. Patient is on Xarelto. She does wear oxygen at night. She has a history of COPD, asthma, diastolic dysfunction, A. fib, multiple myeloma. [facility]    Nursing Notes were reviewed. REVIEW OF SYSTEMS       Review of Systems   Constitutional: Negative for appetite change and fever. HENT: Negative for congestion, drooling and rhinorrhea. Respiratory: Positive for cough and shortness of breath. Cardiovascular: Positive for chest pain. Gastrointestinal: Negative for abdominal pain, blood in stool, diarrhea, nausea and vomiting. Genitourinary: Negative for difficulty urinating. Musculoskeletal: Negative for neck stiffness. Skin: Negative for color change and rash. Neurological: Negative for dizziness, syncope, light-headedness, numbness and headaches.              PAST MEDICAL HISTORY     Past Medical History:   Diagnosis Date    A-fib [facility])     Actinic keratoses     Allergic rhinitis, cause unspecified 4/12/2012    Anxiety 4/12/2012    Arthritis of right shoulder region     Asthma     Candida esophagitis (HCC)     GI-Dr. Eloisa Cifuentes    Chemotherapy-induced neuropathy (HCC)     Chronic pain syndrome     CKD (chronic kidney disease) stage 3, GFR 30-59 ml/min 9/29/2014    COPD ([facility]ronic obstructive pulmonary disease) ([facility] [place] [facility]     Depression     Diabetes [facility] 2017    Diaphoresis 4/15/2016    Dyslipidemia     unable to tolerate statins/tricor    Dysphagia     Fatigue     FH: CAD (coronary artery disease) 2012    GERD (gastroesophageal reflux disease)     History of DVT of lower extremity     HTN (hypertension)     Insomnia 2012    Intertrigo     Labral tear of shoulder     right    Lipoma     Low back pain 2015    Lower extremity edema 2012    Multiple myeloma (HCC)     Neuropathy (HCC)     hands and feet    Osteoarthritis of spine     PE (pulmonary embolism)     Prediabetes 2014    Right arm pain     Seizures (HCC)     Skin cancer, basal cell     Dr. Renée Bustos in [address] [place] [address] SOB (shortness of breath) 4/15/2016    Statin intolerance     Tendinopathy of right shoulder     Tremor 2012    Warfarin anticoagulation          SURGICAL HISTORY       Past Surgical History:   Procedure Laterality Date     SECTION      FOOT SURGERY      HEMORRHOID SURGERY      Dr. Cerda Said Left 2013    ear basal cell    NERVE BLOCK Right 2016    [facility] JOY PALACIOS MD  SUPRASCAPULAR NB PULSED RF    OTHER SURGICAL HISTORY  14    [facility] NERVE BLOCK MEDIAN BRANCH    VENA CAVA FILTER PLACEMENT           CURRENT MEDICATIONS       Previous Medications    ALBUTEROL (PROVENTIL) (2.5 MG/3ML) 0.083% NEBULIZER SOLUTION    Take 3 mLs by nebulization every 4 hours as needed for Wheezing    AMMONIUM LACTATE (LAC-HYDRIN) 12 % LOTION    Apply topically as needed. CALCIUM CARBONATE-VITAMIN D (CALCIUM 600 + D PO)    Take 1 tablet by mouth 2 times daily. CLONAZEPAM (KLONOPIN) 0.5 MG TABLET    Take 1 tablet by mouth 2 times daily. CLONAZEPAM (KLONOPIN) 0.5 MG TABLET    Take 1 tablet by mouth nightly as needed (insomnia) .     DEXTROMETHORPHAN-GUAIFENESIN (MUCINEX DM)  MG TB12    Take 1 tablet by mouth 2 times daily    DEXTROMETHORPHAN-GUAIFENESIN (MUCINEX DM)  MG TB12 Take 1 tablet by mouth 2 times daily as needed (cough)    DICLOFENAC SODIUM 1 % GEL    Apply 2 g topically 2 times daily    DILTIAZEM (CARDIZEM CD) 360 MG EXTENDED RELEASE CAPSULE    TAKE 1 CAPSULE DAILY    DULOXETINE (CYMBALTA) 60 MG EXTENDED RELEASE CAPSULE    TAKE ONE CAPSULE BY MOUTH EVERY DAY    ESOMEPRAZOLE (NEXIUM) 40 MG DELAYED RELEASE CAPSULE    TAKE ONE CAPSULE BY MOUTH EVERY MORNING BEFORE BREAKFAST    FLUTICASONE (FLONASE) 50 MCG/ACT NASAL SPRAY    1 spray by Nasal route 2 times daily. FUROSEMIDE (LASIX) 20 MG TABLET    TAKE 1 TABLET TWICE A DAY    HYDROCORTISONE (ANUSOL-HC) 25 MG SUPPOSITORY    Place 1 suppository rectally 2 times daily as needed for Hemorrhoids. LIDOCAINE (LIDODERM) 5 %    Place 1 patch onto the skin daily 12 hours on, 12 hours off. LISINOPRIL (PRINIVIL;ZESTRIL) 5 MG TABLET    TAKE 1 TABLET DAILY    LORATADINE (CLARITIN) 10 MG TABLET    Take 1 tablet by mouth daily    NYSTATIN (MYCOSTATIN) 153774 UNIT/GM CREAM    Apply to affected area 3 times daily    OMEPRAZOLE (PRILOSEC) 40 MG CAPSULE    Take 1 capsule by mouth daily Take 2 pills daily    OXYCODONE 5 MG CAPSULE    Take 5 mg by mouth. POTASSIUM CHLORIDE (KLOR-CON M) 20 MEQ EXTENDED RELEASE TABLET    TAKE 1 TABLET DAILY    PRIMIDONE (MYSOLINE) 50 MG TABLET    TAKE 1 TABLET NIGHTLY    PROCHLORPERAZINE (COMPAZINE) 10 MG TABLET    Take 10 mg by mouth    PROMETHAZINE-CODEINE (PHENERGAN WITH CODEINE) 6.25-10 MG/5ML SYRUP    take 1 teaspoon (5 mls) by mouth twice daily as needed for cough    RIVAROXABAN (XARELTO) 20 MG TABS TABLET    TAKE 1 TABLET DAILY    SUCRALFATE (CARAFATE) 1 GM TABLET    Take 1 g by mouth 4 times daily    SYMBICORT 160-4.5 MCG/ACT AERO    USE 2 INHALATIONS TWICE A DAY    VENLAFAXINE (EFFEXOR-XR) 75 MG XR CAPSULE    TAKE ONE CAPSULE BY MOUTH EVERY DAY       ALLERGIES     Bactrim; Doxycycline;  Fenofibrate; Lipitor; and Pcn [penicillins]    FAMILY HISTORY       Family History   Problem Relation Age of rash noted. Psychiatric: She has a normal mood and affect. Her behavior is normal. Judgment and thought content normal.       DIAGNOSTIC RESULTS     EKG: All EKG's are interpreted by the Emergency Department Physician who either signs or Co-signs this chart in the absence of a cardiologist.    EKG shows A. fib, heart 86, heart rate irregularly irregular, no ST segment changes    RADIOLOGY:   Non-plain film images such as CT, Ultrasound and MRI are read by the radiologist. Plain radiographic images are visualized and preliminarily interpreted by the emergency physician with the below findings:    Interpretation per the Radiologist below, if available at the time of this note:    XR CHEST STANDARD (2 VW)    (Results Pending)           LABS:  Labs Reviewed   COMPREHENSIVE METABOLIC PANEL - Abnormal; Notable for the following:        Result Value    Chloride 95 (*)     Anion Gap 14 (*)     Glucose 200 (*)     Calcium 8.4 (*)     Total Protein 5.7 (*)     Alb 3.3 (*)     All other components within normal limits   CBC WITH AUTO DIFFERENTIAL - Abnormal; Notable for the following:     WBC 21.8 (*)     MCH 26.0 (*)     MCHC 31.6 (*)     RDW 15.7 (*)     Neutrophils # 19.1 (*)     Monocytes # 1.3 (*)     All other components within normal limits   CULTURE BLOOD #1   CULTURE BLOOD #2   TROPONIN   BRAIN NATRIURETIC PEPTIDE       All other labs were within normal range or not returned as of this dictation. EMERGENCY DEPARTMENT COURSE and DIFFERENTIAL DIAGNOSIS/MDM:   Vitals:    Vitals:    12/06/17 2125 12/06/17 2130 12/06/17 2225 12/06/17 2259   BP: 129/65  115/75 118/68   Pulse: 89  82 90   Resp: 25 26 22 23   Temp: 97.8 °F (36.6 °C)      TempSrc: Oral      SpO2: (!) 86% 96% 94% 95%   Weight: 220 lb (99.8 kg)      Height: 5' 7\" (1.702 m)            MDM    Chest x-ray reveals bilateral upper lobe pneumonia, worse on the left. Leukocytosis of 21 is present. Rocephin and Zithromax were initially ordered.   After questioning patient about recent difficulty swallowing or vomiting, she does state she vomited a few days ago. Zithromax was given, Rocephin was DC'd. Due to pneumonia in bilateral upper lobes I am concerned for aspiration pneumonia. Patient is penicillin allergic. She was started on Levaquin. She is currently 95% on 2 L. On reassessment rhonchi and faint expiratory wheezing is auscultated. She has minimal labored respirations. She is laughing and smiling in the cart. BNP was ordered to rule out underlying CHF. She will be admitted at this time        [address] time was 0 minutes, excluding separately reportable procedures. There was a high probability of clinically significant/life threatening deterioration in the patient's condition which required my urgent intervention. Procedures    FINAL IMPRESSION      1. COPD exacerbation ([facility]    2. Aspiration pneumonia of both upper lobes due to vomit (HCC)    3. Leukocytosis, unspecified type          DISPOSITION/PLAN   DISPOSITION Decision to Admit    PATIENT REFERRED TO:  No follow-up provider specified. DISCHARGE MEDICATIONS:  New Prescriptions    No medications on file          (Please note that portions of this note were completed with a voice recognition program.  Efforts were made to edit the dictations but occasionally words are mis-transcribed. )    NANCY Marquez (electronically signed)  Emergency Physician [address] [place] [address], [facility]  12/06/17 [id number]

## 2017-12-08 ENCOUNTER — APPOINTMENT (OUTPATIENT)
Dept: GENERAL RADIOLOGY | Age: 82
DRG: 195 | End: 2017-12-08
Payer: MEDICARE

## 2017-12-08 VITALS
WEIGHT: 222.66 LBS | BODY MASS INDEX: 37.1 KG/M2 | TEMPERATURE: 97.5 F | RESPIRATION RATE: 17 BRPM | HEART RATE: 88 BPM | OXYGEN SATURATION: 100 % | SYSTOLIC BLOOD PRESSURE: 131 MMHG | HEIGHT: 65 IN | DIASTOLIC BLOOD PRESSURE: 65 MMHG

## 2017-12-08 LAB
GLUCOSE BLD-MCNC: 226 MG/DL (ref 60–115)
GLUCOSE BLD-MCNC: 231 MG/DL (ref 60–115)
PERFORMED ON: ABNORMAL
PERFORMED ON: ABNORMAL

## 2017-12-08 PROCEDURE — 2700000000 HC OXYGEN THERAPY PER DAY

## 2017-12-08 PROCEDURE — 71020 XR CHEST STANDARD TWO VW: CPT

## 2017-12-08 PROCEDURE — 94640 AIRWAY INHALATION TREATMENT: CPT

## 2017-12-08 PROCEDURE — 6370000000 HC RX 637 (ALT 250 FOR IP): Performed by: INTERNAL MEDICINE

## 2017-12-08 PROCEDURE — G0378 HOSPITAL OBSERVATION PER HR: HCPCS

## 2017-12-08 PROCEDURE — 96366 THER/PROPH/DIAG IV INF ADDON: CPT

## 2017-12-08 PROCEDURE — 2580000003 HC RX 258: Performed by: INTERNAL MEDICINE

## 2017-12-08 PROCEDURE — 6360000002 HC RX W HCPCS: Performed by: INTERNAL MEDICINE

## 2017-12-08 PROCEDURE — 99232 SBSQ HOSP IP/OBS MODERATE 35: CPT | Performed by: INTERNAL MEDICINE

## 2017-12-08 RX ORDER — ALBUTEROL SULFATE 90 UG/1
2 AEROSOL, METERED RESPIRATORY (INHALATION) EVERY 6 HOURS PRN
Qty: 1 INHALER | Refills: 3 | Status: SHIPPED | OUTPATIENT
Start: 2017-12-08 | End: 2018-07-25

## 2017-12-08 RX ADMIN — OXYCODONE HYDROCHLORIDE AND ACETAMINOPHEN 1 TABLET: 5; 325 TABLET ORAL at 09:26

## 2017-12-08 RX ADMIN — LEVOFLOXACIN 750 MG: 5 INJECTION, SOLUTION INTRAVENOUS at 00:45

## 2017-12-08 RX ADMIN — FAMOTIDINE 20 MG: 20 TABLET, FILM COATED ORAL at 09:14

## 2017-12-08 RX ADMIN — SUCRALFATE 1 G: 1 TABLET ORAL at 11:27

## 2017-12-08 RX ADMIN — IPRATROPIUM BROMIDE AND ALBUTEROL SULFATE 1 AMPULE: .5; 3 SOLUTION RESPIRATORY (INHALATION) at 12:20

## 2017-12-08 RX ADMIN — LISINOPRIL 5 MG: 5 TABLET ORAL at 09:13

## 2017-12-08 RX ADMIN — FUROSEMIDE 20 MG: 20 TABLET ORAL at 09:14

## 2017-12-08 RX ADMIN — DULOXETINE HYDROCHLORIDE 60 MG: 60 CAPSULE, DELAYED RELEASE ORAL at 09:13

## 2017-12-08 RX ADMIN — SODIUM CHLORIDE, PRESERVATIVE FREE 10 ML: 5 INJECTION INTRAVENOUS at 00:45

## 2017-12-08 RX ADMIN — INSULIN LISPRO 4 UNITS: 100 INJECTION, SOLUTION INTRAVENOUS; SUBCUTANEOUS at 11:32

## 2017-12-08 RX ADMIN — CLONAZEPAM 0.5 MG: 0.5 TABLET ORAL at 09:13

## 2017-12-08 RX ADMIN — SODIUM CHLORIDE, PRESERVATIVE FREE 10 ML: 5 INJECTION INTRAVENOUS at 09:15

## 2017-12-08 RX ADMIN — DILTIAZEM HYDROCHLORIDE 360 MG: 180 CAPSULE, COATED, EXTENDED RELEASE ORAL at 09:14

## 2017-12-08 RX ADMIN — FLUTICASONE PROPIONATE 1 SPRAY: 50 SPRAY, METERED NASAL at 09:13

## 2017-12-08 RX ADMIN — INSULIN LISPRO 4 UNITS: 100 INJECTION, SOLUTION INTRAVENOUS; SUBCUTANEOUS at 09:13

## 2017-12-08 RX ADMIN — RIVAROXABAN 20 MG: 20 TABLET, FILM COATED ORAL at 09:14

## 2017-12-08 RX ADMIN — IPRATROPIUM BROMIDE AND ALBUTEROL SULFATE 1 AMPULE: .5; 3 SOLUTION RESPIRATORY (INHALATION) at 07:42

## 2017-12-08 RX ADMIN — SUCRALFATE 1 G: 1 TABLET ORAL at 09:14

## 2017-12-08 ASSESSMENT — PAIN DESCRIPTION - LOCATION: LOCATION: GENERALIZED

## 2017-12-08 ASSESSMENT — PAIN DESCRIPTION - FREQUENCY: FREQUENCY: CONTINUOUS

## 2017-12-08 ASSESSMENT — PAIN DESCRIPTION - ONSET: ONSET: ON-GOING

## 2017-12-08 ASSESSMENT — PAIN DESCRIPTION - PAIN TYPE: TYPE: CHRONIC PAIN

## 2017-12-08 ASSESSMENT — PAIN SCALES - GENERAL
PAINLEVEL_OUTOF10: 4
PAINLEVEL_OUTOF10: 6

## 2017-12-08 ASSESSMENT — PAIN DESCRIPTION - PROGRESSION: CLINICAL_PROGRESSION: NOT CHANGED

## 2017-12-08 ASSESSMENT — PAIN DESCRIPTION - DESCRIPTORS: DESCRIPTORS: ACHING

## 2017-12-08 NOTE — DISCHARGE INSTR - DIET

## 2017-12-08 NOTE — PROGRESS NOTES
INPATIENT PROGRESS NOTES    PATIENT NAME: Lacey Connors  MRN: [id number]  SERVICE DATE:  December 8, 2017   SERVICE TIME:  10:33 AM      PRIMARY SERVICE:  Pulmonary Medicine     INTERVAL HPI: Patient seen and examined at bedside, Interval Notes, orders reviewed. Nursing notes noted: Patient reports slight improvement in her breathing today. Since hospital admission she has required 3 L of oxygen via nasal cannula and she is usually at 2 L at home as needed. Patient still has productive cough with pinkish sputum production. Chest x-ray was completed that showed increased markings in the left lower lobe area suggestive pneumonia. Patient does report improvement after starting IV antibiotic therapy. Patient is a little bit shaky today, she reports that this is chronic but she does feel like that is a little bit worse this morning. This could be related to patient's elevated glucose which they're keeping an eye on in managing with insulin. Patient denies any chest pain, leg swelling, abdominal pain, nausea, diarrhea, fever and chills. Patient does have elevated white blood cell count however it is trending down from 21.8 yesterday to 18.3 today. Review of Systems  Please see HPI above. OBJECTIVE    Body mass index is 37.05 kg/m². PHYSICAL EXAM:  Vitals:  /74   Pulse 76   Temp 98.4 °F (36.9 °C) (Oral)   Resp 17   Ht 5' 5\" (1.651 m)   Wt 222 lb 10.6 oz (101 kg)   SpO2 96%   BMI 37.05 kg/m²   General: Patient is comfortable sitting upright in a chair, No distress. Head: Atraumatic , Normocephalic   Eyes: PERRL. No sclera icterus. No conjunctival injection. No discharge   ENT: No nasal  discharge. Pharynx clear. Neck:  Trachea midline. No thyromegaly, no JVD, No cervical adenopathy. Resp : Diminished breath sounds bilaterally with few scattered rhonchi at the bases. Crackles at the left base also noted. No wheezing normal effort and accessory muscle use. CV: Normal  rate. Regular rhythm. No mumur ,  Rub or gallop  ABD: Non-tender. Non-distended. No masses. No organmegaly. Normal bowel sounds. No hernia. EXT: No Pitting, No Cyanosis No clubbing  Neuro: no focal weakness  Skin: Warm and dry. No erythema rash on exposed extremities. DATA:   Recent Labs      12/06/17   2151  12/07/17   0644   WBC  21.8*  18.3*   HGB  14.0  13.8   HCT  44.4  43.2   MCV  82.3  83.3   PLT  154  137     Recent Labs      12/06/17   2145  12/07/17   0644   NA  133  133   K  4.2  4.8   CL  95*  93*   CO2  24  27   BUN  10  14   CREATININE  0.72  0.93*   GLUCOSE  200*  326*   CALCIUM  8.4*  8.7   PROT  5.7*  5.7*   LABALBU  3.3*  3.1*   BILITOT  0.8  0.7   ALKPHOS  85  88   AST  18  18   ALT  12  13   LABGLOM  >60.0  57.3*   GFRAA  >60.0  >60.0   GLOB  2.4  2.6         No results for input(s): PHART, HVH3VOK, PO2ART, OYQ2ASH, BEART, D7OJOUMN in the last 72 hours.   O2 Device: Nasal cannula  O2 Flow Rate (L/min): 3 L/min  Lab Results   Component Value Date    LACTA 1.1 01/11/2016        MEDICATIONS during current hospitalization:    Continuous Infusions:   dextrose         Scheduled Meds:   sodium chloride flush  10 mL Intravenous 2 times per day    famotidine  20 mg Oral BID    clonazePAM  0.5 mg Oral BID    diltiazem  360 mg Oral Daily    DULoxetine  60 mg Oral Daily    fluticasone  1 spray Nasal BID    furosemide  20 mg Oral Daily    lisinopril  5 mg Oral Daily    rivaroxaban  20 mg Oral Daily with breakfast    sucralfate  1 g Oral 4x Daily AC & HS    insulin lispro  0-12 Units Subcutaneous TID WC    insulin lispro  0-6 Units Subcutaneous Nightly    levofloxacin  750 mg Intravenous Q24H    ipratropium-albuterol  1 ampule Inhalation TID       PRN Meds:sodium chloride flush, acetaminophen, magnesium hydroxide, ondansetron, glucose, dextrose, glucagon (rDNA), dextrose, albuterol, oxyCODONE-acetaminophen    Radiology  Xr Chest Standard (2 Vw)    Result Date: 12/7/2017  EXAMINATION: XR CHEST STANDARD TWO VW CLINICAL HISTORY: Short of breath. Cough producing pink sputum. COMPARISONS: March 30, 2017 FINDINGS: Frontal and lateral views the chest were obtained showing infiltrate and/or edema, left greater than right, centered on the hilum. A small volume of subpulmonic effusion is suspected on the left. Heart size is at upper limits of normal. The mediastinum is not widened or shifted. There is an unremarkable, unchanged venous access port in place from the right internal jugular vein, with its tip in the superior vena cava. Incidentally noted is advanced degenerative disease in both shoulders. There is no acute chest wall abnormality. CONCLUSION: INFILTRATE AND/OR EDEMA, LEFT GREATER THAN RIGHT. ASSESSMENT /Plan:  1. Left lower lobe pneumonia, improving on antibiotic therapy. 2. History of asthma with mild exacerbation associated with lower respiratory tract infection    Continue current treatment plan clinically patient is improving she is currently on IV Levaquin 750 milligrams, DuoNeb 3 times daily. Repeat chest x-ray was ordered for today and is not completed yet. We will review this and continue to follow.     Electronically signed by Lesley Porter PA-C on 12/8/2017 at 10:33 AM

## 2017-12-08 NOTE — PROGRESS NOTES
Pt up to the bathroom with one assist and walker. Very sob with any exertion. Remains on 3 liters N/C. Medications given per orders. Lungs diminished and wheezes noted. Hourly rounds continue. Fall precautions in place.

## 2017-12-08 NOTE — HOME CARE
because of   Deconditioned due to medical condition     Certification and medical necessity: I certify that, based on my findings, the following services are medically necessary home health services for Josh Dwyer for the following reasons:  - Vital signs monitoring: Nurse to perform BP, HR, RR, Temp, and Weight with each visit and teach patient and caregivers on taking daily.  Nurse to call physician if pulse less than 50 or greater than 120, respiratory rate less than 12 or greater than 25, oral temperature greater than or equal to 447 oF, systolic BP less than 90 or greater than 341, diastolic BP less than 50 or greater than 100.  -PHYSICAL THERAPY TO EVAL AND TREAT FOR SAFETY AND STRENGTHEING AND ENDURANCE. OXYGEN                                 [x] Home O2     2Liters per NC    [x] AT NIGHT AND WHEN SHE IS OUT MOVING AROUND ALOT   [] Bipap            [] CPap   [] Ventilator     [x] NebulizeR        WOUND CARE                                 Wound Care / Mary Jo Zane / Erum Drown / Lelan Culver / Peardalila Aiden / [address]       LAB WORK                                  8/10/2017  6:54 PM - Azael, Chpo Incoming Lab Results From Soft     Component Results     Component Value Ref Range & Units Status Collected Lab   Hemoglobin A1C 7.2   4.8 - 5.9 % Final 08/10/2017  2:10 PM [address] [facility]          OUTPATIENT TESTING                                   [] [facility]             [x] Other -THIS PT NEEDS TO FOLLOW UP ON HER DIABETES WITH DR. Ezekiel Navarro TOLD ME SHE IS \"PRE-DIABETIC\" AND HAS NOT BEEN ON MEDICATION FOR DIABETES AND HAS NOT BEEN DOING ANY BGM AT HOME.   BLOOD SUGARS HIGH WITH STEROIDS.                                   Home Care Coordinator: RN Signature Electronically signed by Bennett Gannon RN on 12/8/17 at 2:24 PM   12/8/2017

## 2017-12-08 NOTE — DISCHARGE SUMMARY
albuterol sulfate HFA (VENTOLIN HFA) 108 (90 Base) MCG/ACT inhaler  Inhale 2 puffs into the lungs every 6 hours as needed for Wheezing             ammonium lactate (LAC-HYDRIN) 12 % lotion  Apply topically as needed. Calcium Carbonate-Vitamin D (CALCIUM 600 + D PO)  Take 1 tablet by mouth 2 times daily. clonazePAM (KLONOPIN) 0.5 MG tablet  Take 1 tablet by mouth 2 times daily. clonazePAM (KLONOPIN) 0.5 MG tablet  Take 1 tablet by mouth nightly as needed (insomnia) . Dextromethorphan-Guaifenesin (MUCINEX DM)  MG TB12  Take 1 tablet by mouth 2 times daily as needed (cough)             diclofenac sodium 1 % GEL  Apply 2 g topically 2 times daily             diltiazem (CARDIZEM CD) 360 MG extended release capsule  TAKE 1 CAPSULE DAILY             DULoxetine (CYMBALTA) 60 MG extended release capsule  TAKE ONE CAPSULE BY MOUTH EVERY DAY             esomeprazole (NEXIUM) 40 MG delayed release capsule  TAKE ONE CAPSULE BY MOUTH EVERY MORNING BEFORE BREAKFAST             fluticasone (FLONASE) 50 MCG/ACT nasal spray  1 spray by Nasal route 2 times daily. furosemide (LASIX) 20 MG tablet  TAKE 1 TABLET TWICE A DAY             hydrocortisone (ANUSOL-HC) 25 MG suppository  Place 1 suppository rectally 2 times daily as needed for Hemorrhoids.              lidocaine (LIDODERM) 5 %  Place 1 patch onto the skin daily 12 hours on, 12 hours off.             lisinopril (PRINIVIL;ZESTRIL) 5 MG tablet  TAKE 1 TABLET DAILY             loratadine (CLARITIN) 10 MG tablet  Take 1 tablet by mouth daily             nystatin (MYCOSTATIN) 154504 UNIT/GM cream  Apply to affected area 3 times daily             oxyCODONE 5 MG capsule  Take 5 mg by mouth.             potassium chloride (KLOR-CON M) 20 MEQ extended release tablet  TAKE 1 TABLET DAILY             primidone (MYSOLINE) 50 MG tablet  TAKE 1 TABLET NIGHTLY             prochlorperazine (COMPAZINE) 10 MG tablet  Take 10 mg by mouth             rivaroxaban (XARELTO) 20 MG TABS tablet  TAKE 1 TABLET DAILY             sucralfate (CARAFATE) 1 GM tablet  Take 1 g by mouth 4 times daily             SYMBICORT 160-4.5 MCG/ACT AERO  USE 2 INHALATIONS TWICE A DAY             venlafaxine (EFFEXOR-XR) 75 MG XR capsule  TAKE ONE CAPSULE BY MOUTH EVERY DAY                 Activity: activity as tolerated    Diet: regular diet    Wound Care: as directed    Follow-up:  As directed    Electronically signed by Jolly Yates MD on 12/8/17 at 12:18 PM

## 2017-12-08 NOTE — CARE COORDINATION
DR SEGURA HAS CLEARED THE PATIENT FOR DISCHARGE. EVER NEGRETE SERVED DR Joice Closs REGARDING POSSIBLE DISCHARGE. I SPOKE WITH THE PATIENTS SPOUSE AND HE WILL GO HOME TO GET HER PORTABLE TANK. PATIENT STATES SHE WOULD LIKE THE PORTABLE CONCENTRATOR. RESP THERAPIST ADELITA HERE AND WHEN HE BRINGS BACK HER TANK SHE WILL GET THE O2 COMPANY NAME TO CALL AND INQUIRE.   Electronically signed by Maritza Evans RN on 12/8/17 at 12:26 PM

## 2017-12-09 ENCOUNTER — CARE COORDINATION (OUTPATIENT)
Dept: CASE MANAGEMENT | Age: 82
End: 2017-12-09

## 2017-12-09 NOTE — CARE COORDINATION
[facility] Transitions Initial Follow Up Call    Call within 2 business days of discharge: Yes    Patient: Dakota Martínez Patient : 3/3/1933   MRN: <[id number]>  Reason for Admission: Pneumonia  Discharge Date: 17 RARS: [facility] Risk Score: 23.5    Attempted initial 24 hour transitional call to patient. Left VM to return call directly to [phone], [phone] or [phone]. 1st attempt.       Facility: [facility]    Non-face-to-face services provided:  Obtained and reviewed discharge summary and/or continuity of care documents    Inpatient Assessment  Care Transitions 24 Hour Call    Patient DME:  Too Perales, [address] [facility] [place] chair, Wheelchair  Patient Home Equipment:  Nebulizer, Oxygen  Do you have support at home?:  Partner/Spouse/SO  Are you an active caregiver in your home?:  No  Care Transitions Interventions         Follow Up  Future Appointments  Date Time Provider Fco Wells   2017 10:30 AM Karol Bishop MD [facility]  [facility], RN

## 2017-12-10 NOTE — CARE COORDINATION
[facility] Transitions Initial Follow Up Call    Call within 2 business days of discharge: Yes    Patient: Hiren Mcknight Patient : 3/3/1933   MRN: <[id number]>  Reason for Admission: Pneumonia  Discharge Date: 17 RARS: [facility] Risk Score: 23.5     Attempted initial 24 hour transitional call to patient. Left VM to return call directly to [phone], [phone] or [phone]. 2nd attempt.         Facility: [facility]    Non-face-to-face services provided:  Obtained and reviewed discharge summary and/or continuity of care documents    Inpatient Assessment  Care Transitions 24 Hour Call    Patient DME:  Too Lema, [address] [facility] chair, Wheelchair  Patient Home Equipment:  Nebulizer, Oxygen  Do you have support at home?:  Partner/Spouse/SO  Are you an active caregiver in your home?:  No  Care Transitions Interventions         Follow Up  Future Appointments  Date Time Provider Fco Wells   2017 10:30 AM Candida Dickey MD [facility]  [facility], RN

## 2017-12-12 LAB
BLOOD CULTURE, ROUTINE: NORMAL
CULTURE, BLOOD 2: NORMAL

## 2017-12-20 ENCOUNTER — TELEPHONE (OUTPATIENT)
Dept: FAMILY MEDICINE CLINIC | Age: 82
End: 2017-12-20

## 2017-12-20 DIAGNOSIS — M47.9 OSTEOARTHRITIS OF SPINE, UNSPECIFIED SPINAL OSTEOARTHRITIS COMPLICATION STATUS, UNSPECIFIED SPINAL REGION: ICD-10-CM

## 2017-12-20 DIAGNOSIS — G89.4 CHRONIC PAIN SYNDROME: Primary | ICD-10-CM

## 2017-12-20 DIAGNOSIS — R60.0 LOWER EXTREMITY EDEMA: ICD-10-CM

## 2017-12-20 DIAGNOSIS — R25.1 TREMOR: ICD-10-CM

## 2017-12-20 NOTE — TELEPHONE ENCOUNTER
[facility] care- Physical therapy  is recommending OT. Please fax order to  [phone].

## 2017-12-28 ENCOUNTER — OFFICE VISIT (OUTPATIENT)
Dept: FAMILY MEDICINE CLINIC | Age: 82
End: 2017-12-28

## 2017-12-28 VITALS
HEIGHT: 65 IN | TEMPERATURE: 97.4 F | WEIGHT: 222.4 LBS | OXYGEN SATURATION: 97 % | BODY MASS INDEX: 37.05 KG/M2 | HEART RATE: 85 BPM | DIASTOLIC BLOOD PRESSURE: 70 MMHG | SYSTOLIC BLOOD PRESSURE: 122 MMHG

## 2017-12-28 DIAGNOSIS — R06.02 SOB (SHORTNESS OF BREATH): ICD-10-CM

## 2017-12-28 DIAGNOSIS — J44.9 CHRONIC OBSTRUCTIVE PULMONARY DISEASE, UNSPECIFIED COPD TYPE (HCC): ICD-10-CM

## 2017-12-28 DIAGNOSIS — I50.9 CONGESTIVE HEART FAILURE, UNSPECIFIED CONGESTIVE HEART FAILURE CHRONICITY, UNSPECIFIED CONGESTIVE HEART FAILURE TYPE: ICD-10-CM

## 2017-12-28 DIAGNOSIS — E11.65 TYPE 2 DIABETES MELLITUS WITH HYPERGLYCEMIA, WITHOUT LONG-TERM CURRENT USE OF INSULIN (HCC): ICD-10-CM

## 2017-12-28 DIAGNOSIS — R60.9 EDEMA, UNSPECIFIED TYPE: ICD-10-CM

## 2017-12-28 DIAGNOSIS — H10.32 ACUTE CONJUNCTIVITIS OF LEFT EYE, UNSPECIFIED ACUTE CONJUNCTIVITIS TYPE: ICD-10-CM

## 2017-12-28 DIAGNOSIS — R11.0 NAUSEA: ICD-10-CM

## 2017-12-28 DIAGNOSIS — J18.9 PNEUMONIA OF LEFT LOWER LOBE DUE TO INFECTIOUS ORGANISM: Primary | ICD-10-CM

## 2017-12-28 PROCEDURE — 99214 OFFICE O/P EST MOD 30 MIN: CPT | Performed by: NURSE PRACTITIONER

## 2017-12-28 RX ORDER — ERYTHROMYCIN 5 MG/G
OINTMENT OPHTHALMIC 3 TIMES DAILY
Qty: 1 TUBE | Refills: 0 | Status: SHIPPED | OUTPATIENT
Start: 2017-12-28 | End: 2018-01-22 | Stop reason: SDUPTHER

## 2017-12-28 RX ORDER — AZITHROMYCIN 250 MG/1
250 TABLET, FILM COATED ORAL DAILY
COMMUNITY
End: 2018-03-01 | Stop reason: ALTCHOICE

## 2017-12-28 RX ORDER — FUROSEMIDE 20 MG/1
TABLET ORAL
Qty: 90 TABLET | Refills: 3 | Status: SHIPPED | OUTPATIENT
Start: 2017-12-28 | End: 2018-05-23 | Stop reason: SDUPTHER

## 2017-12-28 RX ORDER — PROCHLORPERAZINE MALEATE 10 MG
10 TABLET ORAL EVERY 8 HOURS PRN
Qty: 120 TABLET | Refills: 0 | Status: SHIPPED | OUTPATIENT
Start: 2017-12-28 | End: 2019-01-01 | Stop reason: SDUPTHER

## 2017-12-28 ASSESSMENT — ENCOUNTER SYMPTOMS
COUGH: 1
SHORTNESS OF BREATH: 1
EYE REDNESS: 1

## 2017-12-28 NOTE — PROGRESS NOTES
Subjective  Chief Complaint   Patient presents with    Follow-Up from Hospital       HPI     Pt here for hospital follow up for pneumonia. Had been having shortness of breath and cough for a few weeks so she presented to the ER where she was found to have bilateral pneumonia. She was treated with IV antibiotics and did improve clinically and then was discharged to home. She says she has continued to feel \"bad\" at home since she was discharged. Says she does still have a cough but it is clear now. She was also diagnosed with CHF while at the hospital. Says she is feeling better than she was. She is now having visiting home care nurses and nurse practitioner through palliative care who is also writing orders for her and adjusting her medications. She has been taking her lasix 2 pills every morning and 1 pill if needed in the afternoon depending on degree of edema. Her glucose was also running high while in the hospital; however she was not discharged on any medications for this and has not been monitoring at home. Also concerned about possible pink eye, has been having discomfort and redness.      Patient Active Problem List    Diagnosis Date Noted    Pneumonia of left lower lobe due to infectious organism ([facility] 12/06/2017    Diabetes ([facility] [address] 08/01/2017    Actinic keratoses     Dysphagia     SOB (shortness of breath) 04/15/2016    Diaphoresis 04/15/2016    Fatigue     Lipoma     Hypogammaglobulinemia ([address] 10/16/2015    Intertrigo     Arthritis of right shoulder region     Labral tear of shoulder     Tendinopathy of right shoulder     Low back pain 01/07/2015    Right arm pain     Osteoarthritis of lumbosacral spine without myelopathy 12/17/2014    Anemia 10/06/2014    CKD (chronic kidney disease) stage 3, GFR 30-59 ml/min 09/29/2014    Osteoarthritis of spine     Prediabetes 09/17/2014    Cancer associated pain 12/16/2013    Statin intolerance     Chemotherapy-induced neuropathy ([facility] [address]     Recurrent pneumonia 09/14/2012    Hypogammaglobulinemia, acquired ([address] 09/14/2012    FH: CAD (coronary artery disease) 65/75/3957    Diastolic dysfunction 43/62/9514    LVH (left ventricular hypertrophy) 05/23/2012    Lower extremity edema 04/12/2012    Tremor 04/12/2012    Allergic rhinitis 04/12/2012    Anxiety 04/12/2012    Insomnia 04/12/2012    Chronic pain syndrome     Multiple myeloma (HCC)     Asthma     A-fib ([facility] [address]     HTN (hypertension)     GERD (gastroesophageal reflux disease)     COPD (chronic obstructive pulmonary disease) (HCC)     Depression     Hereditary and idiopathic peripheral neuropathy 05/27/2009    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 Evaristo Brown    Chemotherapy-induced neuropathy (HCC)     Chronic pain syndrome     CKD (chronic kidney disease) stage 3, GFR 30-59 ml/min 9/29/2014    COPD (chronic obstructive pulmonary disease) ([facility] [address]     Depression     Diabetes ([facility] [address] 08/2017    Diaphoresis 4/15/2016    Dyslipidemia     unable to tolerate statins/tricor    Dysphagia     Fatigue     FH: CAD (coronary artery disease) 5/23/2012    GERD (gastroesophageal reflux disease)     History of DVT of lower extremity     HTN (hypertension)     Insomnia 4/12/2012    Intertrigo     Labral tear of shoulder     right    Lipoma     Low back pain 1/7/2015    Lower extremity edema 4/12/2012    Multiple myeloma (HCC)     Neuropathy (HCC)     hands and feet    Osteoarthritis of spine     PE (pulmonary embolism)     Prediabetes 9/17/2014    Right arm pain     Seizures (HCC)     Skin cancer, basal cell     Dr. iDpesh Mack in [place]     SOB (shortness of breath) 4/15/2016    Statin intolerance     capsule TAKE ONE CAPSULE BY MOUTH EVERY MORNING BEFORE BREAKFAST 30 capsule 11    diclofenac sodium 1 % GEL Apply 2 g topically 2 times daily 100 g 5    Dextromethorphan-Guaifenesin (MUCINEX DM)  MG TB12 Take 1 tablet by mouth 2 times daily as needed (cough) 28 tablet 1    primidone (MYSOLINE) 50 MG tablet TAKE 1 TABLET NIGHTLY 90 tablet 3    lisinopril (PRINIVIL;ZESTRIL) 5 MG tablet TAKE 1 TABLET DAILY 90 tablet 3    SYMBICORT 160-4.5 MCG/ACT AERO USE 2 INHALATIONS TWICE A DAY 3 Inhaler 3    DULoxetine (CYMBALTA) 60 MG extended release capsule TAKE ONE CAPSULE BY MOUTH EVERY DAY 30 capsule 11    diltiazem (CARDIZEM CD) 360 MG extended release capsule TAKE 1 CAPSULE DAILY 90 capsule 3    sucralfate (CARAFATE) 1 GM tablet Take 1 g by mouth 4 times daily      lidocaine (LIDODERM) 5 % Place 1 patch onto the skin daily 12 hours on, 12 hours off. 90 patch 3    albuterol (PROVENTIL) (2.5 MG/3ML) 0.083% nebulizer solution Take 3 mLs by nebulization every 4 hours as needed for Wheezing 180 each 2    loratadine (CLARITIN) 10 MG tablet Take 1 tablet by mouth daily 30 tablet 0    oxyCODONE 5 MG capsule Take 5 mg by mouth.  ammonium lactate (LAC-HYDRIN) 12 % lotion Apply topically as needed. 1 Bottle 5    hydrocortisone (ANUSOL-HC) 25 MG suppository Place 1 suppository rectally 2 times daily as needed for Hemorrhoids. 20 suppository 0    Calcium Carbonate-Vitamin D (CALCIUM 600 + D PO) Take 1 tablet by mouth 2 times daily.  fluticasone (FLONASE) 50 MCG/ACT nasal spray 1 spray by Nasal route 2 times daily. No current facility-administered medications on file prior to visit. Allergies   Allergen Reactions    Bactrim      Trouble sleeping    Doxycycline     Fenofibrate     Lipitor     Pcn [Penicillins] Swelling       Review of Systems   Constitutional: Negative for chills, diaphoresis, fatigue and fever. Eyes: Positive for redness.    Respiratory: Positive for cough (a little cough once in a while but is clear now) and shortness of breath. Cardiovascular: Positive for leg swelling. Negative for chest pain and palpitations. Objective  Vitals:    12/28/17 1317   BP: 122/70   Pulse: 85   Temp: 97.4 °F (36.3 °C)   TempSrc: Tympanic   SpO2: 97%   Weight: 222 lb 6.4 oz (100.9 kg)   Height: 5' 5\" (1.651 m)     Physical Exam   Constitutional: She is oriented to person, place, and time. She appears well-developed and well-nourished. No distress. HENT:   Head: Normocephalic and atraumatic. Right Ear: Tympanic membrane, external ear and ear canal normal.   Left Ear: Tympanic membrane, external ear and ear canal normal.   Nose: Nose normal.   Mouth/Throat: Oropharynx is clear and moist. No oropharyngeal exudate. Eyes: Pupils are equal, round, and reactive to light. Left eye exhibits no chemosis, no discharge, no exudate and no hordeolum. No foreign body present in the left eye. Left conjunctiva is injected. Neck: Normal range of motion. Neck supple. Cardiovascular: Normal rate, regular rhythm and normal heart sounds. Pulmonary/Chest: Effort normal and breath sounds normal. No respiratory distress. Musculoskeletal: She exhibits edema (trace bilateral lower ext). Lymphadenopathy:     She has no cervical adenopathy. Neurological: She is alert and oriented to person, place, and time. Skin: Skin is warm and dry. No rash noted. She is not diaphoretic. No erythema. No pallor. Psychiatric: She has a normal mood and affect. Her behavior is normal. Judgment and thought content normal.     Assessment & Plan    1. Pneumonia of left lower lobe due to infectious organism [facility])  Mike Bradley MD   2. SOB (shortness of breath)  Mike Bradley MD   3. Congestive heart failure, unspecified congestive heart failure chronicity, unspecified congestive heart failure type (HCC)  furosemide (LASIX) 20 MG tablet   4.  Edema, unspecified

## 2018-01-08 RX ORDER — DILTIAZEM HYDROCHLORIDE 360 MG/1
CAPSULE, EXTENDED RELEASE ORAL
Qty: 90 CAPSULE | Refills: 3 | Status: SHIPPED | OUTPATIENT
Start: 2018-01-08 | End: 2019-01-09 | Stop reason: SDUPTHER

## 2018-01-09 ENCOUNTER — TELEPHONE (OUTPATIENT)
Dept: FAMILY MEDICINE CLINIC | Age: 83
End: 2018-01-09

## 2018-01-09 NOTE — TELEPHONE ENCOUNTER
Ana Feliz, [phone], from [address] calling. Pt is on Lasix 40 MG in the morning and then a 20 MG tablet in the evening, but being on that, pt weighed 220 pounds on January 4th. Today her weight is 226.4 pounds. Weight is gaining due to medication. Pt does have edema in bilateral lower extremities and makes pt exhausted when she sits and stands up. Would you want to increase the Lasix? Call Tram up until 5 PM, if after 5 PM, please call pt.

## 2018-01-22 ENCOUNTER — OFFICE VISIT (OUTPATIENT)
Dept: FAMILY MEDICINE CLINIC | Age: 83
End: 2018-01-22
Payer: MEDICARE

## 2018-01-22 VITALS
DIASTOLIC BLOOD PRESSURE: 60 MMHG | WEIGHT: 217 LBS | SYSTOLIC BLOOD PRESSURE: 120 MMHG | TEMPERATURE: 97.2 F | HEIGHT: 66 IN | BODY MASS INDEX: 34.87 KG/M2 | HEART RATE: 84 BPM | OXYGEN SATURATION: 95 %

## 2018-01-22 DIAGNOSIS — R60.0 LOCALIZED EDEMA: Primary | ICD-10-CM

## 2018-01-22 DIAGNOSIS — R60.0 LOCALIZED EDEMA: ICD-10-CM

## 2018-01-22 DIAGNOSIS — R06.02 SHORTNESS OF BREATH: ICD-10-CM

## 2018-01-22 DIAGNOSIS — B37.0 ORAL THRUSH: ICD-10-CM

## 2018-01-22 DIAGNOSIS — H10.32 ACUTE CONJUNCTIVITIS OF LEFT EYE, UNSPECIFIED ACUTE CONJUNCTIVITIS TYPE: ICD-10-CM

## 2018-01-22 DIAGNOSIS — M62.838 MUSCLE SPASM OF RIGHT SHOULDER: ICD-10-CM

## 2018-01-22 LAB
ANION GAP SERPL CALCULATED.3IONS-SCNC: 19 MEQ/L (ref 7–13)
BUN BLDV-MCNC: 15 MG/DL (ref 8–23)
CALCIUM SERPL-MCNC: 9.7 MG/DL (ref 8.6–10.2)
CHLORIDE BLD-SCNC: 94 MEQ/L (ref 98–107)
CO2: 25 MEQ/L (ref 22–29)
CREAT SERPL-MCNC: 0.74 MG/DL (ref 0.5–0.9)
GFR AFRICAN AMERICAN: >60
GFR NON-AFRICAN AMERICAN: >60
GLUCOSE BLD-MCNC: 159 MG/DL (ref 74–109)
POTASSIUM SERPL-SCNC: 4.3 MEQ/L (ref 3.5–5.1)
SODIUM BLD-SCNC: 138 MEQ/L (ref 132–144)

## 2018-01-22 PROCEDURE — 99214 OFFICE O/P EST MOD 30 MIN: CPT | Performed by: NURSE PRACTITIONER

## 2018-01-22 RX ORDER — METHYLPREDNISOLONE 4 MG/1
TABLET ORAL
Refills: 0 | COMMUNITY
Start: 2017-12-28 | End: 2018-03-01 | Stop reason: ALTCHOICE

## 2018-01-22 RX ORDER — OXYCODONE HCL 20 MG/1
20 TABLET, FILM COATED, EXTENDED RELEASE ORAL
COMMUNITY
Start: 2016-08-30 | End: 2018-06-05 | Stop reason: ALTCHOICE

## 2018-01-22 RX ORDER — TIZANIDINE HYDROCHLORIDE 2 MG/1
2 CAPSULE, GELATIN COATED ORAL 3 TIMES DAILY PRN
Qty: 30 CAPSULE | Refills: 0 | Status: SHIPPED | OUTPATIENT
Start: 2018-01-22 | End: 2018-06-05

## 2018-01-22 RX ORDER — ALPRAZOLAM 0.25 MG/1
0.25 TABLET ORAL
COMMUNITY
Start: 2017-04-27 | End: 2018-04-24

## 2018-01-22 RX ORDER — ERYTHROMYCIN 5 MG/G
OINTMENT OPHTHALMIC 3 TIMES DAILY
Qty: 1 TUBE | Refills: 0 | Status: SHIPPED | OUTPATIENT
Start: 2018-01-22 | End: 2018-02-01

## 2018-01-22 NOTE — PROGRESS NOTES
01/22/18 1421 01/22/18 1422   BP:  118/60 120/60   Pulse: 84     Temp: 97.2 °F (36.2 °C)     TempSrc: Tympanic     SpO2: 95%     Weight: 217 lb (98.4 kg)     Height: 5' 5.5\" (1.664 m)       Physical Exam   Constitutional: She is oriented to person, place, and time. She appears well-developed and well-nourished. No distress. HENT:   Head: Normocephalic and atraumatic. Eyes: Left conjunctiva is injected. Cardiovascular: Normal rate, regular rhythm and normal heart sounds. Pulmonary/Chest: Effort normal and breath sounds normal. No respiratory distress. Musculoskeletal: She exhibits edema (trace to 1+ pitting edema bilateral lower ext). Neurological: She is alert and oriented to person, place, and time. Skin: Skin is warm and dry. No rash noted. She is not diaphoretic. No erythema. No pallor. Psychiatric: She has a normal mood and affect. Her behavior is normal. Judgment and thought content normal.       Assessment & Plan    1. Localized edema  Basic Metabolic Panel    XR CHEST STANDARD (2 VW)   2. Muscle spasm of right shoulder  tiZANidine (ZANAFLEX) 2 MG capsule   3. Oral thrush  nystatin (MYCOSTATIN) 250867 UNIT/ML suspension   4. Acute conjunctivitis of left eye, unspecified acute conjunctivitis type  erythromycin (ROMYCIN) 5 MG/GM ophthalmic ointment   5. Shortness of breath  XR CHEST STANDARD (2 VW)     Continue lasix at current dose. Check BMP and chest xray today. Zanaflex to be taken sparingly PRN for muscle spasms. Refill other medications. Follow up with specialists as scheduled. Follow up with Dr. Noriega Manual in 4 weeks. Side effects, adverse effects of the medication prescribed today, as well as treatment plan/ rationale and result expectations have been discussed with the patient who expresses understanding and desires to proceed. Close follow up to evaluate treatment results and for coordination of care.   I have reviewed the patient's medical history in detail and updated the

## 2018-01-23 ASSESSMENT — ENCOUNTER SYMPTOMS
SHORTNESS OF BREATH: 0
COUGH: 0

## 2018-01-26 DIAGNOSIS — F51.01 PRIMARY INSOMNIA: ICD-10-CM

## 2018-01-27 RX ORDER — CLONAZEPAM 0.5 MG/1
0.5 TABLET ORAL NIGHTLY PRN
Qty: 30 TABLET | Refills: 0 | Status: SHIPPED | OUTPATIENT
Start: 2018-01-27 | End: 2018-03-25 | Stop reason: SDUPTHER

## 2018-01-27 RX ORDER — DULOXETIN HYDROCHLORIDE 60 MG/1
CAPSULE, DELAYED RELEASE ORAL
Qty: 30 CAPSULE | Refills: 11 | Status: SHIPPED | OUTPATIENT
Start: 2018-01-27 | End: 2018-05-23

## 2018-02-01 ENCOUNTER — OFFICE VISIT (OUTPATIENT)
Dept: PALLATIVE CARE | Age: 83
End: 2018-02-01
Payer: MEDICARE

## 2018-02-01 VITALS
OXYGEN SATURATION: 96 % | DIASTOLIC BLOOD PRESSURE: 48 MMHG | HEART RATE: 78 BPM | SYSTOLIC BLOOD PRESSURE: 137 MMHG | RESPIRATION RATE: 16 BRPM

## 2018-02-01 DIAGNOSIS — R60.9 EDEMA, UNSPECIFIED TYPE: ICD-10-CM

## 2018-02-01 DIAGNOSIS — Z71.89 OTHER SPECIFIED COUNSELING: ICD-10-CM

## 2018-02-01 DIAGNOSIS — B37.9 CANDIDIASIS: ICD-10-CM

## 2018-02-01 DIAGNOSIS — R52 PAIN: Primary | ICD-10-CM

## 2018-02-01 DIAGNOSIS — R06.02 SOB (SHORTNESS OF BREATH): ICD-10-CM

## 2018-02-01 PROCEDURE — 99350 HOME/RES VST EST HIGH MDM 60: CPT | Performed by: NURSE PRACTITIONER

## 2018-02-01 RX ORDER — NYSTATIN 100000 [USP'U]/G
POWDER TOPICAL
Qty: 56.7 BOTTLE | Refills: 3 | Status: SHIPPED | OUTPATIENT
Start: 2018-02-01 | End: 2018-08-08 | Stop reason: ALTCHOICE

## 2018-02-01 ASSESSMENT — ENCOUNTER SYMPTOMS
APNEA: 0
VOICE CHANGE: 0
DIARRHEA: 0
WHEEZING: 0
SHORTNESS OF BREATH: 0
BACK PAIN: 0
ANAL BLEEDING: 0
EYE DISCHARGE: 0
CONSTIPATION: 0
STRIDOR: 0
ABDOMINAL PAIN: 0
CHEST TIGHTNESS: 0
CHOKING: 0
TROUBLE SWALLOWING: 0
VOMITING: 0
COLOR CHANGE: 0
SORE THROAT: 0
RECTAL PAIN: 0
COUGH: 0
ABDOMINAL DISTENTION: 0
NAUSEA: 0
BLOOD IN STOOL: 0

## 2018-02-01 NOTE — PROGRESS NOTES
Dispense Refill    clonazePAM (KLONOPIN) 0.5 MG tablet Take 1 tablet by mouth nightly as needed (insomnia) for up to 30 days. 30 tablet 0    DULoxetine (CYMBALTA) 60 MG extended release capsule TAKE ONE CAPSULE BY MOUTH EVERY DAY 30 capsule 11    ALPRAZolam (XANAX) 0.25 MG tablet Take 0.25 mg by mouth.  oxyCODONE (OXYCONTIN) 20 MG extended release tablet Take 20 mg by mouth.  methylPREDNISolone (MEDROL DOSEPACK) 4 MG tablet TAKE BY MOUTH AS DIRECTED  0    nystatin (MYCOSTATIN) 249935 UNIT/ML suspension Take 5 milliliters by mouth 4 times daily 180 mL 2    tiZANidine (ZANAFLEX) 2 MG capsule Take 1 capsule by mouth 3 times daily as needed for Muscle spasms 30 capsule 0    erythromycin (ROMYCIN) 5 MG/GM ophthalmic ointment Place into the left eye 3 times daily for 10 days Nightly.  1 Tube 0    SYMBICORT 160-4.5 MCG/ACT AERO USE 2 INHALATIONS TWICE A DAY 30.6 g 3    lisinopril (PRINIVIL;ZESTRIL) 5 MG tablet TAKE 1 TABLET DAILY 90 tablet 3    diltiazem (CARDIZEM CD) 360 MG extended release capsule TAKE 1 CAPSULE DAILY 90 capsule 3    azithromycin (ZITHROMAX) 250 MG tablet Take 250 mg by mouth daily      prochlorperazine (COMPAZINE) 10 MG tablet Take 1 tablet by mouth every 8 hours as needed (nausea) 120 tablet 0    furosemide (LASIX) 20 MG tablet TAKE 2 tablets in AM and 1 additional tablet in the afternoon if needed 90 tablet 3    albuterol sulfate HFA (VENTOLIN HFA) 108 (90 Base) MCG/ACT inhaler Inhale 2 puffs into the lungs every 6 hours as needed for Wheezing 1 Inhaler 3    rivaroxaban (XARELTO) 20 MG TABS tablet TAKE 1 TABLET DAILY 90 tablet 3    potassium chloride (KLOR-CON M) 20 MEQ extended release tablet TAKE 1 TABLET DAILY 90 tablet 3    esomeprazole (NEXIUM) 40 MG delayed release capsule TAKE ONE CAPSULE BY MOUTH EVERY MORNING BEFORE BREAKFAST 30 capsule 11    Dextromethorphan-Guaifenesin (MUCINEX DM)  MG TB12 Take 1 tablet by mouth 2 times daily as needed (cough) 28 tablet 1 Psychiatric/Behavioral: Negative for agitation, behavioral problems, confusion, decreased concentration, dysphoric mood, hallucinations, self-injury, sleep disturbance and suicidal ideas. The patient is not nervous/anxious and is not hyperactive. Objective:   BP (!) 137/48 (Site: Left Arm, Position: Sitting, Cuff Size: Medium Adult)   Pulse 78   Resp 16   LMP  (LMP Unknown)   SpO2 96%     Physical Exam   Constitutional: She is oriented to person, place, and time. She appears well-developed and well-nourished. No distress. HENT:   Head: Normocephalic and atraumatic. Right Ear: External ear normal.   Left Ear: External ear normal.   Nose: Nose normal.   Mouth/Throat: Oropharynx is clear and moist. No oropharyngeal exudate. Eyes: Conjunctivae and EOM are normal. Pupils are equal, round, and reactive to light. Right eye exhibits no discharge. Left eye exhibits no discharge. No scleral icterus. Neck: Normal range of motion. Neck supple. No JVD present. No tracheal deviation present. No thyromegaly present. Cardiovascular: Normal rate, regular rhythm and normal heart sounds. Trace edema BLE   Pulmonary/Chest: Effort normal. No stridor. No respiratory distress. She has decreased breath sounds. She has no wheezes. She has no rales. She exhibits no tenderness. Abdominal: Soft. Bowel sounds are normal. She exhibits no distension and no mass. There is no tenderness. There is no rebound and no guarding. Musculoskeletal: Normal range of motion. She exhibits no edema, tenderness or deformity. Lymphadenopathy:     She has no cervical adenopathy. Neurological: She is alert and oriented to person, place, and time. Skin: Skin is warm and dry. No rash noted. She is not diaphoretic. No erythema. Psychiatric: She has a normal mood and affect. Her behavior is normal. Thought content normal.         Assessment:      1. Pain     2.  Edema, unspecified type             Plan:   Edema  - Take 30 mg furosemide bid for the next three days, then return to current regimen   - Daily weights, call if BILIÉ you gain 3 lbs in one day or 5 lbs. in one week, or for increased edema, sob, cough, orthopnea, or chest pain  - Elevate BLE  - Compression socks on during day, off at night  - Keep salt intake under 1800 mg daily    Pain  - Continue current poc  - Heat or ice to painful areas, whichever is preferred  - Gentle ROM exercises  - Call for uncontrolled pain    SOB (Controlled)  - Continue COPD Directed therapies   - Call for increased SOB, cough, sputum production, excessive fatigue, fever, chills, or myalgias  - Gentle exercise as tolerated  -  Rinse out mouth after inhaler use. - COPD ER PACK in place: Call prior to initializing    Other Counseling  - Discussed type II DM, encouraged Jo Ludwig to have her blood drawn for HA1c in order to see if she needs treatment, discussed lifestyle modifications    Candidiasis  - Topical nystatin as needed  - open to air as much as possible    Much support, active listening, and presence to [facility] in 4-6 weeks, sooner prn.       Bhupinder Campbell, CNP

## 2018-02-02 ENCOUNTER — OFFICE VISIT (OUTPATIENT)
Dept: CARDIOLOGY CLINIC | Age: 83
End: 2018-02-02
Payer: MEDICARE

## 2018-02-02 VITALS
TEMPERATURE: 96.2 F | RESPIRATION RATE: 20 BRPM | OXYGEN SATURATION: 92 % | HEART RATE: 95 BPM | SYSTOLIC BLOOD PRESSURE: 132 MMHG | BODY MASS INDEX: 35.65 KG/M2 | DIASTOLIC BLOOD PRESSURE: 78 MMHG | HEIGHT: 66 IN | WEIGHT: 221.8 LBS

## 2018-02-02 DIAGNOSIS — G62.0 CHEMOTHERAPY-INDUCED NEUROPATHY (HCC): ICD-10-CM

## 2018-02-02 DIAGNOSIS — T45.1X5A CHEMOTHERAPY-INDUCED NEUROPATHY (HCC): ICD-10-CM

## 2018-02-02 DIAGNOSIS — I10 ESSENTIAL HYPERTENSION: ICD-10-CM

## 2018-02-02 DIAGNOSIS — E11.65 TYPE 2 DIABETES MELLITUS WITH HYPERGLYCEMIA, WITHOUT LONG-TERM CURRENT USE OF INSULIN (HCC): ICD-10-CM

## 2018-02-02 DIAGNOSIS — I48.0 PAROXYSMAL ATRIAL FIBRILLATION (HCC): Primary | ICD-10-CM

## 2018-02-02 PROCEDURE — 99214 OFFICE O/P EST MOD 30 MIN: CPT | Performed by: INTERNAL MEDICINE

## 2018-02-02 ASSESSMENT — ENCOUNTER SYMPTOMS
CHEST TIGHTNESS: 0
SHORTNESS OF BREATH: 1
NAUSEA: 0
VOMITING: 1

## 2018-02-02 NOTE — PROGRESS NOTES
Reason For Visit:  Chief Complaint   Patient presents with    Follow-Up from [facility] ER on 12/6/17    Atrial Fibrillation       HPI:  2/2/18: Patient presents today for Recent hospitalization. She has chronic atrial fibrillation and is on Xarelto. Has moderate aortic valve stenosis. Recent echo showed aortic valve area 1.6 cm. She is on palliative care. On oxygen at night at 3 L. When she came in her O2 was 88-89%. Would benefit from portable oxygen. Multiple myeloma is under remission. She is morbidly obese. She'll see me in 3 months. Problem List:  Patient Active Problem List   Diagnosis    Multiple myeloma ([address]    Asthma    A-fib ([address]    HTN (hypertension)    GERD (gastroesophageal reflux disease)    COPD (chronic obstructive pulmonary disease) (HCC)    Depression    Chronic pain syndrome    Lower extremity edema    Tremor    Allergic rhinitis    Anxiety    Insomnia    FH: CAD (coronary artery disease)    Diastolic dysfunction    LVH (left ventricular hypertrophy)    Recurrent pneumonia    Hypogammaglobulinemia, acquired ([address]    Statin intolerance    Chemotherapy-induced neuropathy ([address]    Cancer associated pain    Prediabetes    Osteoarthritis of spine    CKD (chronic kidney disease) stage 3, GFR 30-59 ml/min    Anemia    Right arm pain    Arthritis of right shoulder region    Labral tear of shoulder    Tendinopathy of right shoulder    Intertrigo    Lipoma    Hypogammaglobulinemia ([address]    Low back pain    Osteoarthritis of lumbosacral spine without myelopathy    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HCC)    Hereditary and idiopathic peripheral neuropathy    Fatigue    SOB (shortness of breath)    Diaphoresis    Dysphagia    Actinic keratoses    Diabetes ([address]    Pneumonia of left lower lobe due to infectious organism ([address]       Allergies:   Allergies   Allergen Reactions    Bactrim      Trouble sleeping    Doxycycline     Fenofibrate  Lipitor     Pcn [Penicillins] Swelling       Personal History:   has a past medical history of A-fib ([facility] [address] Actinic keratoses; Allergic rhinitis, cause unspecified; Anxiety; Arthritis of right shoulder region; Asthma; Candida esophagitis ([facility] [address] Chemotherapy-induced neuropathy ([facility] [address] Chronic pain syndrome; CKD (chronic kidney disease) stage 3, GFR 30-59 ml/min; COPD (chronic obstructive pulmonary disease) ([facility] [address] Depression; Diabetes ([address] Diaphoresis; Dyslipidemia; Dysphagia; Fatigue; FH: CAD (coronary artery disease); GERD (gastroesophageal reflux disease); History of DVT of lower extremity; HTN (hypertension); Insomnia; Intertrigo; Labral tear of shoulder; Lipoma; Low back pain; Lower extremity edema; Multiple myeloma ([address] Neuropathy ([facility] [address] Osteoarthritis of spine; PE (pulmonary embolism); Prediabetes; Right arm pain; Seizures ([facility] [address] Skin cancer, basal cell; SOB (shortness of breath); Statin intolerance; Tendinopathy of right shoulder; Tremor; and Warfarin anticoagulation. Social History:   reports that she quit smoking about 26 years ago. Her smoking use included Cigarettes. She quit after 43.00 years of use. She has never used smokeless tobacco. She reports that she drinks alcohol. She reports that she does not use drugs. Surgical History:  Past Surgical History:   Procedure Laterality Date     SECTION      FOOT SURGERY      HEMORRHOID SURGERY      Dr. Salomon Carvajal Left     ear basal cell    NERVE BLOCK Right 2016    [facility] JOY PALACIOS MD  SUPRASCAPULAR NB PULSED RF    OTHER SURGICAL HISTORY  14    [facility] NERVE BLOCK MEDIAN BRANCH    VENA CAVA FILTER PLACEMENT         Family History:  family history includes Arthritis in her brother and sister; Cancer in her brother, father, mother, and sister; Diabetes in her brother and sister; Heart Disease in her brother; Kidney Disease in her mother; Stroke in her mother.       Current Medications:    Current Outpatient DAILY, Disp: 90 tablet, Rfl: 3    esomeprazole (NEXIUM) 40 MG delayed release capsule, TAKE ONE CAPSULE BY MOUTH EVERY MORNING BEFORE BREAKFAST, Disp: 30 capsule, Rfl: 11    Dextromethorphan-Guaifenesin (MUCINEX DM)  MG TB12, Take 1 tablet by mouth 2 times daily as needed (cough), Disp: 28 tablet, Rfl: 1    primidone (MYSOLINE) 50 MG tablet, TAKE 1 TABLET NIGHTLY, Disp: 90 tablet, Rfl: 3    albuterol (PROVENTIL) (2.5 MG/3ML) 0.083% nebulizer solution, Take 3 mLs by nebulization every 4 hours as needed for Wheezing, Disp: 180 each, Rfl: 2    loratadine (CLARITIN) 10 MG tablet, Take 1 tablet by mouth daily, Disp: 30 tablet, Rfl: 0    oxyCODONE 5 MG capsule, Take 5 mg by mouth., Disp: , Rfl:     ammonium lactate (LAC-HYDRIN) 12 % lotion, Apply topically as needed. , Disp: 1 Bottle, Rfl: 5    Calcium Carbonate-Vitamin D (CALCIUM 600 + D PO), Take 1 tablet by mouth 2 times daily. , Disp: , Rfl:     fluticasone (FLONASE) 50 MCG/ACT nasal spray, 1 spray by Nasal route 2 times daily. , Disp: , Rfl:       Review of Systems:  Review of Systems   Constitutional: Positive for diaphoresis and fatigue. Respiratory: Positive for shortness of breath. Negative for chest tightness. Cardiovascular: Positive for leg swelling. Negative for chest pain and palpitations. Gastrointestinal: Positive for vomiting. Negative for nausea. Neurological: Negative for dizziness (Once in a while), syncope, light-headedness (Once in a while), numbness and headaches (Once in a while). Psychiatric/Behavioral: Negative for confusion. All other systems reviewed and are negative.         Objective  Vitals:    02/02/18 1146   BP: 132/78   Site: Left Arm   Position: Sitting   Cuff Size: Medium Adult   Pulse: 95   Resp: 20   Temp: 96.2 °F (35.7 °C)   TempSrc: Temporal   SpO2: 92%   Weight: 221 lb 12.8 oz (100.6 kg)   Height: 5' 5.5\" (1.664 m)         Physical Exam:  Physical Exam   Constitutional: She is oriented to person, place,

## 2018-02-07 ENCOUNTER — TELEPHONE (OUTPATIENT)
Dept: FAMILY MEDICINE CLINIC | Age: 83
End: 2018-02-07

## 2018-02-07 RX ORDER — BLOOD SUGAR DIAGNOSTIC
STRIP MISCELLANEOUS
Refills: 11 | COMMUNITY
Start: 2018-01-30 | End: 2019-01-01

## 2018-02-07 RX ORDER — GLUCOSAM/CHON-MSM1/C/MANG/BOSW 500-416.6
TABLET ORAL
Refills: 11 | Status: ON HOLD | COMMUNITY
Start: 2018-01-30 | End: 2019-01-01 | Stop reason: HOSPADM

## 2018-02-19 NOTE — TELEPHONE ENCOUNTER
I called and LMTCB let them know that I was trying to find out what she was missing from her glucometer and supplies.

## 2018-03-01 ENCOUNTER — OFFICE VISIT (OUTPATIENT)
Dept: FAMILY MEDICINE CLINIC | Age: 83
End: 2018-03-01
Payer: MEDICARE

## 2018-03-01 VITALS
BODY MASS INDEX: 36 KG/M2 | OXYGEN SATURATION: 91 % | HEIGHT: 66 IN | SYSTOLIC BLOOD PRESSURE: 116 MMHG | WEIGHT: 224 LBS | DIASTOLIC BLOOD PRESSURE: 68 MMHG | HEART RATE: 120 BPM

## 2018-03-01 DIAGNOSIS — D80.1 HYPOGAMMAGLOBULINEMIA, ACQUIRED (HCC): ICD-10-CM

## 2018-03-01 DIAGNOSIS — J44.9 CHRONIC OBSTRUCTIVE PULMONARY DISEASE, UNSPECIFIED COPD TYPE (HCC): ICD-10-CM

## 2018-03-01 DIAGNOSIS — E11.65 TYPE 2 DIABETES MELLITUS WITH HYPERGLYCEMIA, WITHOUT LONG-TERM CURRENT USE OF INSULIN (HCC): Primary | ICD-10-CM

## 2018-03-01 DIAGNOSIS — E11.65 TYPE 2 DIABETES MELLITUS WITH HYPERGLYCEMIA, WITHOUT LONG-TERM CURRENT USE OF INSULIN (HCC): ICD-10-CM

## 2018-03-01 DIAGNOSIS — D89.89: ICD-10-CM

## 2018-03-01 DIAGNOSIS — I10 ESSENTIAL HYPERTENSION: ICD-10-CM

## 2018-03-01 LAB
GLUCOSE BLD-MCNC: 207 MG/DL
HBA1C MFR BLD: 8 % (ref 4.8–5.9)

## 2018-03-01 PROCEDURE — G8510 SCR DEP NEG, NO PLAN REQD: HCPCS | Performed by: FAMILY MEDICINE

## 2018-03-01 PROCEDURE — 82962 GLUCOSE BLOOD TEST: CPT | Performed by: FAMILY MEDICINE

## 2018-03-01 PROCEDURE — 99213 OFFICE O/P EST LOW 20 MIN: CPT | Performed by: FAMILY MEDICINE

## 2018-03-01 ASSESSMENT — PATIENT HEALTH QUESTIONNAIRE - PHQ9
SUM OF ALL RESPONSES TO PHQ QUESTIONS 1-9: 0
2. FEELING DOWN, DEPRESSED OR HOPELESS: 0
SUM OF ALL RESPONSES TO PHQ9 QUESTIONS 1 & 2: 0
1. LITTLE INTEREST OR PLEASURE IN DOING THINGS: 0

## 2018-03-01 ASSESSMENT — ENCOUNTER SYMPTOMS
ABDOMINAL PAIN: 0
BLURRED VISION: 0

## 2018-03-01 NOTE — PROGRESS NOTES
Subjective  Jennifer Sears, 80 y.o. female presents today with:  Chief Complaint   Patient presents with    Check-Up     HTN    6 Month Follow-Up       Diabetes   She presents for her follow-up diabetic visit. She has type 2 diabetes mellitus. No MedicAlert identification noted. Her disease course has been stable. Pertinent negatives for hypoglycemia include no pallor. Pertinent negatives for diabetes include no blurred vision and no chest pain. There are no hypoglycemic complications. Pertinent negatives for hypoglycemia complications include no blackouts, no hospitalization and no nocturnal hypoglycemia. Symptoms are stable. Risk factors for coronary artery disease include diabetes mellitus, hypertension, post-menopausal and obesity. Current diabetic treatment includes diet. An ACE inhibitor/angiotensin II receptor blocker is being taken. Here for f/u on chronic problems including HTN, DM, COPD, selective immunoglobulin dysfuntion, hypogamaglobulinemia. She sees Dr. Meredith Lubin for her pain. Review of Systems   Constitutional: Negative for fever. Eyes: Negative for blurred vision. Cardiovascular: Negative for chest pain. Gastrointestinal: Negative for abdominal pain. Skin: Negative for pallor and rash.        Past Medical History:   Diagnosis Date    A-fib [facility])     Actinic keratoses     Allergic rhinitis, cause unspecified 4/12/2012    Anxiety 4/12/2012    Arthritis of right shoulder region     Asthma     Candida esophagitis (HCC)     GI-Dr. Jamee Gorman    Chemotherapy-induced neuropathy (HCC)     Chronic pain syndrome     CKD (chronic kidney disease) stage 3, GFR 30-59 ml/min 9/29/2014    COPD [facility]ch[facility] obstructive [facility] disease) [facility] [address]     Depression     Diabetes ([facility] [address] 08/2017    Diaphoresis 4/15/2016    Dyslipidemia     unable to tolerate statins/tricor    Dysphagia     Fatigue     FH: CAD (coronary artery disease) 5/23/2012    GERD (gastroesophageal reflux disease)     History of DVT of lower extremity     HTN (hypertension)     Hypogammaglobulinemia ([facility] [address] 10/16/2015    Insomnia 2012    Intertrigo     Labral tear of shoulder     right    Lipoma     Low back pain 2015    Lower extremity edema 2012    Multiple myeloma (HCC)     Neuropathy (HCC)     hands and feet    Osteoarthritis of spine     PE (pulmonary embolism)     Prediabetes 2014    Right arm pain     Seizures (HCC)     Skin cancer, basal cell     Dr. Michaud Quiet in [address] [facility] SOB (shortness of breath) 4/15/2016    Statin intolerance     Tendinopathy of right shoulder     Tremor 2012    Warfarin anticoagulation      Past Surgical History:   Procedure Laterality Date     SECTION      FOOT SURGERY      HEMORRHOID SURGERY  2014    Dr. Elizabeth March Left     ear basal cell    NERVE BLOCK Right 2016    [facility] PALACI[facility] MD  SUPRASCAPULAR NB PULSED RF    OTHER SURGICAL HISTORY  14    [facility] NERVE BLOCK MED[facility] BRANCH    VENA CAVA FILTER PLACEMENT       Social History     Social History    Marital status:      Spouse name: N/A    Number of children: N/A    Years of education: N/A     Occupational History    Not on file. Social History Main Topics    Smoking status: Former Smoker     Years: 43.00     Types: Cigarettes     Quit date: 1992    Smokeless tobacco: Never Used    Alcohol use Yes      Comment: rare    Drug use: No    Sexual activity: Not on file     Other Topics Concern    Not on file     Social History Narrative    Lives with , has first floor bedroom and bathroom [place].      Family History   Problem Relation Age of Onset   [facility] Cancer Mother     Kidney Disease Mother     Stroke Mother     Cancer Father     Arthritis Sister     Cancer Sister     Diabetes Sister     Arthritis Brother     Cancer Brother     Diabetes Brother     Heart Disease Brother      Allergies   Allergen Reactions    Bactrim selective immunoglobulin dysfunction stable and managed per Dr. Jerman Hairston. F/u 3 months. Orders Placed This Encounter   Procedures    Hemoglobin A1C     Standing Status:   Future     Standing Expiration Date:   3/1/2019     No orders of the defined types were placed in this encounter. Medications Discontinued During This Encounter   Medication Reason    azithromycin (ZITHROMAX) 250 MG tablet Therapy completed    methylPREDNISolone (MEDROL DOSEPACK) 4 MG tablet Therapy completed     Return in about 3 months (around 6/1/2018).     Brennan Mcmillan MD

## 2018-03-03 DIAGNOSIS — E11.65 TYPE 2 DIABETES MELLITUS WITH HYPERGLYCEMIA, WITHOUT LONG-TERM CURRENT USE OF INSULIN (HCC): Primary | ICD-10-CM

## 2018-03-03 RX ORDER — METFORMIN HYDROCHLORIDE 500 MG/1
500 TABLET, EXTENDED RELEASE ORAL
Qty: 30 TABLET | Refills: 3 | Status: SHIPPED | OUTPATIENT
Start: 2018-03-03 | End: 2018-06-06 | Stop reason: DRUGHIGH

## 2018-03-05 ENCOUNTER — CARE COORDINATION (OUTPATIENT)
Dept: FAMILY MEDICINE CLINIC | Age: 83
End: 2018-03-05

## 2018-03-08 ENCOUNTER — OFFICE VISIT (OUTPATIENT)
Dept: PALLATIVE CARE | Age: 83
End: 2018-03-08
Payer: MEDICARE

## 2018-03-08 DIAGNOSIS — J44.9 CHRONIC OBSTRUCTIVE PULMONARY DISEASE, UNSPECIFIED COPD TYPE (HCC): Primary | ICD-10-CM

## 2018-03-08 DIAGNOSIS — R06.02 SOB (SHORTNESS OF BREATH): ICD-10-CM

## 2018-03-08 DIAGNOSIS — D64.89 ANEMIA DUE TO OTHER CAUSE, NOT CLASSIFIED: ICD-10-CM

## 2018-03-08 DIAGNOSIS — R45.86 LABILITY EMOTIONAL: ICD-10-CM

## 2018-03-08 DIAGNOSIS — Z51.5 PALLIATIVE CARE PATIENT: ICD-10-CM

## 2018-03-08 PROCEDURE — 99349 HOME/RES VST EST MOD MDM 40: CPT | Performed by: NURSE PRACTITIONER

## 2018-03-08 ASSESSMENT — ENCOUNTER SYMPTOMS
CHOKING: 0
WHEEZING: 0
STRIDOR: 0
ABDOMINAL PAIN: 0
BLOOD IN STOOL: 0
ABDOMINAL DISTENTION: 0
TROUBLE SWALLOWING: 0
VOMITING: 0
DIARRHEA: 0
RECTAL PAIN: 0
BACK PAIN: 0
COLOR CHANGE: 0
APNEA: 0
VOICE CHANGE: 0
COUGH: 1
CONSTIPATION: 0
SHORTNESS OF BREATH: 1
NAUSEA: 0
EYE DISCHARGE: 0
ANAL BLEEDING: 0
CHEST TIGHTNESS: 0
SORE THROAT: 0

## 2018-03-08 NOTE — PROGRESS NOTES
Allergies   Allergen Reactions    Bactrim      Trouble sleeping    Doxycycline     Fenofibrate     Lipitor     Pcn [Penicillins] Swelling     Current Outpatient Prescriptions on File Prior to Visit   Medication Sig Dispense Refill    metFORMIN (GLUCOPHAGE XR) 500 MG extended release tablet Take 1 tablet by mouth daily (with breakfast) 30 tablet 3    Blood Glucose Monitoring Suppl (ACCU-CHEK OMAR) MAURICIO USE TO TEST ONCE DAILY  0    ACCU-CHEK OMAR PLUS strip TEST ONCE DAILY  11    TRUEPLUS LANCETS 28G MISC TEST ONCE DAILY  11    diclofenac sodium 1 % GEL Apply 2 g topically 2 times daily 1 Tube 3    nystatin (MYCOSTATIN) 094633 UNIT/GM powder Apply 3 times daily. 56.7 Bottle 3    clonazePAM (KLONOPIN) 0.5 MG tablet Take 1 tablet by mouth nightly as needed (insomnia) for up to 30 days. 30 tablet 0    DULoxetine (CYMBALTA) 60 MG extended release capsule TAKE ONE CAPSULE BY MOUTH EVERY DAY 30 capsule 11    ALPRAZolam (XANAX) 0.25 MG tablet Take 0.25 mg by mouth.  oxyCODONE (OXYCONTIN) 20 MG extended release tablet Take 20 mg by mouth.       nystatin (MYCOSTATIN) 296160 UNIT/ML suspension Take 5 milliliters by mouth 4 times daily 180 mL 2    tiZANidine (ZANAFLEX) 2 MG capsule Take 1 capsule by mouth 3 times daily as needed for Muscle spasms 30 capsule 0    SYMBICORT 160-4.5 MCG/ACT AERO USE 2 INHALATIONS TWICE A DAY 30.6 g 3    lisinopril (PRINIVIL;ZESTRIL) 5 MG tablet TAKE 1 TABLET DAILY 90 tablet 3    diltiazem (CARDIZEM CD) 360 MG extended release capsule TAKE 1 CAPSULE DAILY 90 capsule 3    prochlorperazine (COMPAZINE) 10 MG tablet Take 1 tablet by mouth every 8 hours as needed (nausea) 120 tablet 0    furosemide (LASIX) 20 MG tablet TAKE 2 tablets in AM and 1 additional tablet in the afternoon if needed 90 tablet 3    albuterol sulfate HFA (VENTOLIN HFA) 108 (90 Base) MCG/ACT inhaler Inhale 2 puffs into the lungs every 6 hours as needed for Wheezing 1 Inhaler 3    rivaroxaban (XARELTO) 20 MG TABS tablet TAKE 1 TABLET DAILY 90 tablet 3    potassium chloride (KLOR-CON M) 20 MEQ extended release tablet TAKE 1 TABLET DAILY 90 tablet 3    esomeprazole (NEXIUM) 40 MG delayed release capsule TAKE ONE CAPSULE BY MOUTH EVERY MORNING BEFORE BREAKFAST 30 capsule 11    Dextromethorphan-Guaifenesin (MUCINEX DM)  MG TB12 Take 1 tablet by mouth 2 times daily as needed (cough) 28 tablet 1    primidone (MYSOLINE) 50 MG tablet TAKE 1 TABLET NIGHTLY 90 tablet 3    albuterol (PROVENTIL) (2.5 MG/3ML) 0.083% nebulizer solution Take 3 mLs by nebulization every 4 hours as needed for Wheezing 180 each 2    loratadine (CLARITIN) 10 MG tablet Take 1 tablet by mouth daily 30 tablet 0    oxyCODONE 5 MG capsule Take 5 mg by mouth.  ammonium lactate (LAC-HYDRIN) 12 % lotion Apply topically as needed. 1 Bottle 5    Calcium Carbonate-Vitamin D (CALCIUM 600 + D PO) Take 1 tablet by mouth 2 times daily.  fluticasone (FLONASE) 50 MCG/ACT nasal spray 1 spray by Nasal route 2 times daily. No current facility-administered medications on file prior to visit. Review of Systems   Constitutional: Positive for fatigue. Negative for activity change, appetite change, chills, diaphoresis, fever and unexpected weight change. HENT: Negative for drooling, hearing loss, mouth sores, sore throat, trouble swallowing and voice change. Eyes: Negative for discharge and visual disturbance. Respiratory: Positive for cough and shortness of breath. Negative for apnea, choking, chest tightness, wheezing and stridor. Cardiovascular: Negative for chest pain, palpitations and leg swelling. Gastrointestinal: Negative for abdominal distention, abdominal pain, anal bleeding, blood in stool, constipation, diarrhea, nausea, rectal pain and vomiting. Genitourinary: Negative for difficulty urinating, dysuria, enuresis, frequency and hematuria. Musculoskeletal: Positive for arthralgias.  Negative for back pain, gait problem, joint swelling and myalgias. Skin: Negative for color change, pallor, rash and wound. Allergic/Immunologic: Negative for food allergies and immunocompromised state. Neurological: Negative for dizziness, tremors, seizures, syncope, facial asymmetry, speech difficulty, weakness, light-headedness, numbness and headaches. Hematological: Negative for adenopathy. Does not bruise/bleed easily. Psychiatric/Behavioral: Negative for agitation, behavioral problems, confusion, decreased concentration, dysphoric mood, hallucinations, self-injury, sleep disturbance and suicidal ideas. The patient is not nervous/anxious and is not hyperactive. Objective:   LMP  (LMP Unknown)     Physical Exam   Constitutional: She is oriented to person, place, and time. Vital signs are normal. She appears well-developed and well-nourished. No distress. HENT:   Head: Normocephalic and atraumatic. Right Ear: External ear normal.   Left Ear: External ear normal.   Nose: Nose normal.   Mouth/Throat: Oropharynx is clear and moist. No oropharyngeal exudate. Eyes: Conjunctivae and EOM are normal. Pupils are equal, round, and reactive to light. Right eye exhibits no discharge. Left eye exhibits no discharge. No scleral icterus. Neck: Normal range of motion. Neck supple. No JVD present. No tracheal deviation present. No thyromegaly present. Cardiovascular: Normal rate and regular rhythm. Murmur heard. Pulmonary/Chest: Effort normal. No accessory muscle usage or stridor. No respiratory distress. She has decreased breath sounds. She has no wheezes. She has no rales. She exhibits no tenderness. Abdominal: Soft. Bowel sounds are normal. She exhibits no distension and no mass. There is no tenderness. There is no rebound and no guarding. Musculoskeletal: Normal range of motion. She exhibits no edema, tenderness or deformity. Lymphadenopathy:     She has no cervical adenopathy.    Neurological: She is alert and

## 2018-03-26 ENCOUNTER — OFFICE VISIT (OUTPATIENT)
Dept: PULMONOLOGY | Age: 83
End: 2018-03-26
Payer: MEDICARE

## 2018-03-26 VITALS
OXYGEN SATURATION: 96 % | HEIGHT: 66 IN | SYSTOLIC BLOOD PRESSURE: 128 MMHG | DIASTOLIC BLOOD PRESSURE: 72 MMHG | HEART RATE: 102 BPM | TEMPERATURE: 97.2 F | BODY MASS INDEX: 36.8 KG/M2 | WEIGHT: 229 LBS

## 2018-03-26 DIAGNOSIS — J44.9 CHRONIC OBSTRUCTIVE PULMONARY DISEASE, UNSPECIFIED COPD TYPE (HCC): Primary | ICD-10-CM

## 2018-03-26 DIAGNOSIS — J02.9 PHARYNGITIS, UNSPECIFIED ETIOLOGY: ICD-10-CM

## 2018-03-26 PROCEDURE — 99214 OFFICE O/P EST MOD 30 MIN: CPT | Performed by: INTERNAL MEDICINE

## 2018-03-26 RX ORDER — AZITHROMYCIN 250 MG/1
TABLET, FILM COATED ORAL
Qty: 1 PACKET | Refills: 0 | Status: SHIPPED | OUTPATIENT
Start: 2018-03-26 | End: 2018-04-05

## 2018-03-26 ASSESSMENT — ENCOUNTER SYMPTOMS
DIARRHEA: 0
VOMITING: 0
SHORTNESS OF BREATH: 1
NAUSEA: 0
RHINORRHEA: 1
SORE THROAT: 1
WHEEZING: 1
COUGH: 1
SINUS PRESSURE: 0
ABDOMINAL PAIN: 0
CHEST TIGHTNESS: 0

## 2018-03-26 NOTE — PROGRESS NOTES
([address]     Skin cancer, basal cell     Dr. Bear Mays in [address] [facility] SOB (shortness of breath) 4/15/2016    Statin intolerance     Tendinopathy of right shoulder     Tremor 2012    Warfarin anticoagulation      Past Surgical History:   Procedure Laterality Date     SECTION      FOOT SURGERY      HEMORRHOID SURGERY      Dr. Chris Ann Left 2013    ear basal cell    NERVE BLOCK Right 2016    [facility] PAL[facility] MD  SUPRASCAPULAR NB PULSED RF    OTHER SURGICAL HISTORY  14    [facility] NERVE BLOCK MEDIAN BRANCH    VENA CAVA FILTER PLACEMENT       Family History   Problem Relation Age of Onset    Cancer Mother     Kidney Disease Mother     Stroke Mother     Cancer Father     Arthritis Sister     Cancer Sister     Diabetes Sister     Arthritis Brother     Cancer Brother     Diabetes Brother     Heart Disease Brother      Social History     Social History    Marital status:      Spouse name: N/A    Number of children: N/A    Years of education: N/A     Occupational History    Not on file. Social History Main Topics    Smoking status: Former Smoker     Years: 43.00     Types: Cigarettes     Quit date: 1992    Smokeless tobacco: Never Used    Alcohol use Yes      Comment: rare    Drug use: No    Sexual activity: Not on file     Other Topics Concern    Not on file     Social History Narrative    Lives with , has first floor bedroom and bathroom as they [place] a ranch. Review of Systems   Constitutional: Negative for appetite change, chills, diaphoresis, fatigue and fever. HENT: Positive for congestion, postnasal drip, rhinorrhea and sore throat. Negative for nosebleeds, sinus pressure and sneezing. Eyes: Negative for visual disturbance. Respiratory: Positive for cough, shortness of breath and wheezing. Negative for chest tightness. Cardiovascular: Positive for leg swelling. Negative for chest pain and palpitations. Gastrointestinal: Negative for abdominal pain, diarrhea, nausea and vomiting. Genitourinary: Negative for dysuria and urgency. Musculoskeletal: Positive for arthralgias, joint swelling and myalgias. Skin: Negative for rash. Allergic/Immunologic: Negative for environmental allergies. Neurological: Positive for weakness and headaches. Negative for tremors and light-headedness. Psychiatric/Behavioral: Positive for sleep disturbance. Negative for behavioral problems. Objective:     Vitals:    03/26/18 1418   BP: 128/72   Pulse: 102   Temp: 97.2 °F (36.2 °C)   TempSrc: Tympanic   SpO2: 90%   Weight: 229 lb (103.9 kg)   Height: 5' 5.5\" (1.664 m)         Physical Exam   Constitutional: She is oriented to person, place, and time. She appears well-developed and well-nourished. HENT:   Head: Normocephalic and atraumatic. Mouth/Throat: Oropharynx is clear and moist.   Diffusely erythematous oropharynx   Eyes: EOM are normal. Pupils are equal, round, and reactive to light. Neck: No JVD present. No tracheal deviation present. No thyromegaly present. Cardiovascular: Normal rate and regular rhythm. Exam reveals no gallop. No murmur heard. Pulmonary/Chest: She has no wheezes. She has no rales. She exhibits no tenderness. Abdominal: She exhibits no distension. Musculoskeletal: Normal range of motion. She exhibits no edema. Neurological: She is alert and oriented to person, place, and time. Coordination normal.   Skin: Skin is warm and dry. No rash noted. Psychiatric: She has a normal mood and affect. Assessment:     1. Chronic obstructive pulmonary disease, unspecified COPD type ([facility] [address]     2. Pharyngitis, unspecified etiology       Patient has clear evidence of acute pharyngitis for which I will give her a course of antibiotic therapy.   She also has underlying COPD which is stable on current treatment she is on oxygen therapy and was requesting a portable concentrator for use

## 2018-04-06 ENCOUNTER — OFFICE VISIT (OUTPATIENT)
Dept: PALLATIVE CARE | Age: 83
End: 2018-04-06
Payer: MEDICARE

## 2018-04-06 VITALS
SYSTOLIC BLOOD PRESSURE: 110 MMHG | RESPIRATION RATE: 16 BRPM | OXYGEN SATURATION: 99 % | DIASTOLIC BLOOD PRESSURE: 64 MMHG | HEART RATE: 70 BPM

## 2018-04-06 DIAGNOSIS — R06.02 SOB (SHORTNESS OF BREATH): ICD-10-CM

## 2018-04-06 DIAGNOSIS — H05.823 EXTRAOCULAR MUSCLE WEAKNESS OF BOTH EYES: ICD-10-CM

## 2018-04-06 DIAGNOSIS — J44.9 CHRONIC OBSTRUCTIVE PULMONARY DISEASE, UNSPECIFIED COPD TYPE (HCC): Primary | ICD-10-CM

## 2018-04-06 DIAGNOSIS — Z51.5 PALLIATIVE CARE ENCOUNTER: ICD-10-CM

## 2018-04-06 DIAGNOSIS — M62.81 MUSCLE WEAKNESS (GENERALIZED): ICD-10-CM

## 2018-04-06 DIAGNOSIS — R09.02 HYPOXIA: ICD-10-CM

## 2018-04-06 PROCEDURE — 99349 HOME/RES VST EST MOD MDM 40: CPT | Performed by: NURSE PRACTITIONER

## 2018-04-06 ASSESSMENT — ENCOUNTER SYMPTOMS
WHEEZING: 0
SORE THROAT: 0
CHEST TIGHTNESS: 0
VOICE CHANGE: 0
ABDOMINAL DISTENTION: 0
EYE DISCHARGE: 0
BACK PAIN: 0
ABDOMINAL PAIN: 0
CONSTIPATION: 0
ANAL BLEEDING: 0
SHORTNESS OF BREATH: 1
TROUBLE SWALLOWING: 0
APNEA: 0
COLOR CHANGE: 0
DIARRHEA: 0
CHOKING: 0
VOMITING: 0
NAUSEA: 0
STRIDOR: 0
RECTAL PAIN: 0
COUGH: 0
BLOOD IN STOOL: 0

## 2018-04-17 DIAGNOSIS — F51.01 PRIMARY INSOMNIA: ICD-10-CM

## 2018-04-19 RX ORDER — CLONAZEPAM 0.5 MG/1
TABLET ORAL
Qty: 30 TABLET | Refills: 0 | Status: SHIPPED | OUTPATIENT
Start: 2018-04-19 | End: 2018-05-23 | Stop reason: SDUPTHER

## 2018-05-07 ENCOUNTER — OFFICE VISIT (OUTPATIENT)
Dept: PALLATIVE CARE | Age: 83
End: 2018-05-07
Payer: MEDICARE

## 2018-05-07 VITALS
TEMPERATURE: 98 F | DIASTOLIC BLOOD PRESSURE: 70 MMHG | HEART RATE: 70 BPM | OXYGEN SATURATION: 99 % | SYSTOLIC BLOOD PRESSURE: 110 MMHG | RESPIRATION RATE: 16 BRPM

## 2018-05-07 DIAGNOSIS — R06.02 SOB (SHORTNESS OF BREATH): Primary | ICD-10-CM

## 2018-05-07 DIAGNOSIS — R19.7 DIARRHEA, UNSPECIFIED TYPE: ICD-10-CM

## 2018-05-07 DIAGNOSIS — Z51.5 PALLIATIVE CARE ENCOUNTER: ICD-10-CM

## 2018-05-07 PROCEDURE — 99348 HOME/RES VST EST LOW MDM 30: CPT | Performed by: NURSE PRACTITIONER

## 2018-05-07 ASSESSMENT — ENCOUNTER SYMPTOMS
STRIDOR: 0
EYE DISCHARGE: 0
BACK PAIN: 0
ANAL BLEEDING: 0
WHEEZING: 0
DIARRHEA: 1
RECTAL PAIN: 0
NAUSEA: 0
TROUBLE SWALLOWING: 0
CHEST TIGHTNESS: 0
SHORTNESS OF BREATH: 1
COLOR CHANGE: 0
BLOOD IN STOOL: 0
APNEA: 0
CHOKING: 0
VOICE CHANGE: 0
SORE THROAT: 0
COUGH: 0
CONSTIPATION: 0
ABDOMINAL PAIN: 0
ABDOMINAL DISTENTION: 0
VOMITING: 0

## 2018-05-23 ENCOUNTER — TELEPHONE (OUTPATIENT)
Dept: PALLATIVE CARE | Age: 83
End: 2018-05-23

## 2018-05-23 DIAGNOSIS — F51.01 PRIMARY INSOMNIA: ICD-10-CM

## 2018-05-23 DIAGNOSIS — R60.9 EDEMA, UNSPECIFIED TYPE: ICD-10-CM

## 2018-05-23 DIAGNOSIS — I50.9 CONGESTIVE HEART FAILURE, UNSPECIFIED CONGESTIVE HEART FAILURE CHRONICITY, UNSPECIFIED CONGESTIVE HEART FAILURE TYPE: ICD-10-CM

## 2018-05-23 RX ORDER — FUROSEMIDE 20 MG/1
TABLET ORAL
Qty: 90 TABLET | Refills: 3 | Status: SHIPPED | OUTPATIENT
Start: 2018-05-23 | End: 2018-12-10 | Stop reason: SDUPTHER

## 2018-05-23 RX ORDER — CLONAZEPAM 0.5 MG/1
TABLET ORAL
Qty: 30 TABLET | Refills: 0 | Status: SHIPPED | OUTPATIENT
Start: 2018-05-23 | End: 2018-06-08 | Stop reason: SDUPTHER

## 2018-05-23 RX ORDER — DULOXETIN HYDROCHLORIDE 60 MG/1
60 CAPSULE, DELAYED RELEASE ORAL DAILY
Qty: 30 CAPSULE | Refills: 3 | Status: SHIPPED | OUTPATIENT
Start: 2018-05-23 | End: 2018-10-22 | Stop reason: SDUPTHER

## 2018-06-05 ENCOUNTER — OFFICE VISIT (OUTPATIENT)
Dept: FAMILY MEDICINE CLINIC | Age: 83
End: 2018-06-05
Payer: MEDICARE

## 2018-06-05 VITALS
HEIGHT: 66 IN | OXYGEN SATURATION: 95 % | HEART RATE: 86 BPM | DIASTOLIC BLOOD PRESSURE: 80 MMHG | SYSTOLIC BLOOD PRESSURE: 130 MMHG

## 2018-06-05 DIAGNOSIS — J44.9 CHRONIC OBSTRUCTIVE PULMONARY DISEASE, UNSPECIFIED COPD TYPE (HCC): ICD-10-CM

## 2018-06-05 DIAGNOSIS — L29.9 PRURITUS OF SKIN: ICD-10-CM

## 2018-06-05 DIAGNOSIS — E11.65 TYPE 2 DIABETES MELLITUS WITH HYPERGLYCEMIA, WITHOUT LONG-TERM CURRENT USE OF INSULIN (HCC): ICD-10-CM

## 2018-06-05 DIAGNOSIS — I10 ESSENTIAL HYPERTENSION: Primary | ICD-10-CM

## 2018-06-05 DIAGNOSIS — L57.0 ACTINIC KERATOSES: ICD-10-CM

## 2018-06-05 DIAGNOSIS — L82.0 INFLAMED SEBORRHEIC KERATOSIS: ICD-10-CM

## 2018-06-05 DIAGNOSIS — F32.0 MILD SINGLE CURRENT EPISODE OF MAJOR DEPRESSIVE DISORDER (HCC): ICD-10-CM

## 2018-06-05 LAB
CHOLESTEROL, TOTAL: 222 MG/DL (ref 0–199)
HBA1C MFR BLD: 8.1 % (ref 4.8–5.9)
HDLC SERPL-MCNC: 37 MG/DL (ref 40–59)
LDL CHOLESTEROL CALCULATED: 105 MG/DL (ref 0–129)
TRIGL SERPL-MCNC: 400 MG/DL (ref 0–200)

## 2018-06-05 PROCEDURE — 99214 OFFICE O/P EST MOD 30 MIN: CPT | Performed by: FAMILY MEDICINE

## 2018-06-05 PROCEDURE — 17110 DESTRUCTION B9 LES UP TO 14: CPT | Performed by: FAMILY MEDICINE

## 2018-06-05 RX ORDER — BUPRENORPHINE 20 UG/H
1 PATCH TRANSDERMAL
COMMUNITY
Start: 2018-06-02 | End: 2018-07-02

## 2018-06-05 ASSESSMENT — ENCOUNTER SYMPTOMS
ABDOMINAL PAIN: 0
BLURRED VISION: 0
SHORTNESS OF BREATH: 0

## 2018-06-06 DIAGNOSIS — E11.65 TYPE 2 DIABETES MELLITUS WITH HYPERGLYCEMIA, WITHOUT LONG-TERM CURRENT USE OF INSULIN (HCC): Primary | ICD-10-CM

## 2018-06-06 RX ORDER — METFORMIN HYDROCHLORIDE 750 MG/1
750 TABLET, EXTENDED RELEASE ORAL 2 TIMES DAILY
Qty: 60 TABLET | Refills: 5 | Status: SHIPPED | OUTPATIENT
Start: 2018-06-06 | End: 2018-07-25

## 2018-06-08 ENCOUNTER — OFFICE VISIT (OUTPATIENT)
Dept: CARDIOLOGY CLINIC | Age: 83
End: 2018-06-08
Payer: MEDICARE

## 2018-06-08 VITALS
RESPIRATION RATE: 16 BRPM | HEART RATE: 96 BPM | HEIGHT: 65 IN | OXYGEN SATURATION: 93 % | SYSTOLIC BLOOD PRESSURE: 116 MMHG | DIASTOLIC BLOOD PRESSURE: 70 MMHG | BODY MASS INDEX: 37.15 KG/M2 | WEIGHT: 223 LBS

## 2018-06-08 DIAGNOSIS — I48.0 PAROXYSMAL ATRIAL FIBRILLATION (HCC): Primary | ICD-10-CM

## 2018-06-08 DIAGNOSIS — I35.0 AORTIC VALVE STENOSIS, ETIOLOGY OF CARDIAC VALVE DISEASE UNSPECIFIED: ICD-10-CM

## 2018-06-08 DIAGNOSIS — I10 ESSENTIAL HYPERTENSION: ICD-10-CM

## 2018-06-08 PROCEDURE — 99213 OFFICE O/P EST LOW 20 MIN: CPT | Performed by: INTERNAL MEDICINE

## 2018-06-08 RX ORDER — GABAPENTIN 300 MG/1
CAPSULE ORAL
COMMUNITY
Start: 2018-05-25 | End: 2018-07-25 | Stop reason: ALTCHOICE

## 2018-06-08 RX ORDER — CLONAZEPAM 0.5 MG/1
TABLET ORAL
Refills: 0 | COMMUNITY
Start: 2018-03-26 | End: 2018-06-16 | Stop reason: ALTCHOICE

## 2018-06-08 ASSESSMENT — ENCOUNTER SYMPTOMS
APNEA: 0
ABDOMINAL DISTENTION: 0
ABDOMINAL PAIN: 0
NAUSEA: 0
COLOR CHANGE: 0
BLOOD IN STOOL: 0
CHEST TIGHTNESS: 0
COUGH: 0
SHORTNESS OF BREATH: 1
DIARRHEA: 0
VOMITING: 0
ANAL BLEEDING: 0

## 2018-06-15 ENCOUNTER — OFFICE VISIT (OUTPATIENT)
Dept: PALLATIVE CARE | Age: 83
End: 2018-06-15
Payer: MEDICARE

## 2018-06-15 DIAGNOSIS — R06.02 SOB (SHORTNESS OF BREATH): ICD-10-CM

## 2018-06-15 DIAGNOSIS — F40.9 INSOMNIA DUE TO ANXIETY AND FEAR: ICD-10-CM

## 2018-06-15 DIAGNOSIS — F41.9 ANXIETY: Primary | ICD-10-CM

## 2018-06-15 DIAGNOSIS — R19.7 DIARRHEA, UNSPECIFIED TYPE: ICD-10-CM

## 2018-06-15 DIAGNOSIS — F51.05 INSOMNIA DUE TO ANXIETY AND FEAR: ICD-10-CM

## 2018-06-15 PROCEDURE — 99349 HOME/RES VST EST MOD MDM 40: CPT | Performed by: NURSE PRACTITIONER

## 2018-06-16 VITALS
OXYGEN SATURATION: 96 % | SYSTOLIC BLOOD PRESSURE: 130 MMHG | DIASTOLIC BLOOD PRESSURE: 78 MMHG | HEART RATE: 68 BPM | RESPIRATION RATE: 16 BRPM

## 2018-06-16 RX ORDER — CLONAZEPAM 0.5 MG/1
0.5 TABLET ORAL NIGHTLY PRN
Qty: 90 TABLET | Refills: 3 | Status: SHIPPED | OUTPATIENT
Start: 2018-06-16 | End: 2018-07-25

## 2018-06-16 ASSESSMENT — ENCOUNTER SYMPTOMS
NAUSEA: 0
RECTAL PAIN: 0
VOICE CHANGE: 0
APNEA: 0
BLOOD IN STOOL: 0
DIARRHEA: 1
VOMITING: 0
SORE THROAT: 0
SHORTNESS OF BREATH: 1
ANAL BLEEDING: 0
COUGH: 0
CONSTIPATION: 0
TROUBLE SWALLOWING: 0
STRIDOR: 0
CHOKING: 0
EYE DISCHARGE: 0
ABDOMINAL DISTENTION: 0
BACK PAIN: 0
ABDOMINAL PAIN: 0
WHEEZING: 0
CHEST TIGHTNESS: 0
COLOR CHANGE: 0

## 2018-07-25 ENCOUNTER — OFFICE VISIT (OUTPATIENT)
Dept: PALLATIVE CARE | Age: 83
End: 2018-07-25
Payer: MEDICARE

## 2018-07-25 VITALS
RESPIRATION RATE: 16 BRPM | SYSTOLIC BLOOD PRESSURE: 128 MMHG | TEMPERATURE: 98 F | OXYGEN SATURATION: 99 % | HEART RATE: 78 BPM | DIASTOLIC BLOOD PRESSURE: 76 MMHG

## 2018-07-25 DIAGNOSIS — R53.83 FATIGUE, UNSPECIFIED TYPE: ICD-10-CM

## 2018-07-25 DIAGNOSIS — Z51.5 PALLIATIVE CARE ENCOUNTER: ICD-10-CM

## 2018-07-25 DIAGNOSIS — R11.0 NAUSEA: Primary | ICD-10-CM

## 2018-07-25 PROCEDURE — 99349 HOME/RES VST EST MOD MDM 40: CPT | Performed by: NURSE PRACTITIONER

## 2018-07-25 RX ORDER — ONDANSETRON 4 MG/1
4 TABLET, FILM COATED ORAL EVERY 8 HOURS PRN
Qty: 20 TABLET | Refills: 3 | Status: SHIPPED | OUTPATIENT
Start: 2018-07-25 | End: 2018-08-24

## 2018-07-25 RX ORDER — METFORMIN HYDROCHLORIDE 750 MG/1
750 TABLET, EXTENDED RELEASE ORAL
COMMUNITY
Start: 2018-06-06 | End: 2018-12-26 | Stop reason: SDUPTHER

## 2018-07-25 RX ORDER — OXYCODONE HYDROCHLORIDE 5 MG/1
5 CAPSULE ORAL
COMMUNITY
Start: 2018-08-31 | End: 2018-09-30

## 2018-07-25 RX ORDER — BUPRENORPHINE 20 UG/H
1 PATCH TRANSDERMAL
COMMUNITY
Start: 2018-07-02 | End: 2018-09-24

## 2018-07-25 RX ORDER — ALBUTEROL SULFATE 90 UG/1
90 AEROSOL, METERED RESPIRATORY (INHALATION)
COMMUNITY
Start: 2017-12-08 | End: 2018-11-05 | Stop reason: ALTCHOICE

## 2018-07-25 RX ORDER — ALBUTEROL SULFATE 2.5 MG/3ML
2.5 SOLUTION RESPIRATORY (INHALATION)
COMMUNITY
Start: 2015-11-30 | End: 2018-07-25

## 2018-07-25 RX ORDER — GABAPENTIN 300 MG/1
300 CAPSULE ORAL
COMMUNITY
Start: 2018-05-25 | End: 2018-11-05

## 2018-07-25 RX ORDER — CLONAZEPAM 0.5 MG/1
0.5 TABLET ORAL
COMMUNITY
Start: 2018-06-16 | End: 2018-09-14

## 2018-07-25 NOTE — PROGRESS NOTES
Neuropathy (HCC)     hands and feet    Osteoarthritis of spine     PE (pulmonary embolism)     Right arm pain     Seizures (HCC)     Skin cancer, basal cell     Dr. Zuly Powell in [address] [facility] SOB (shortness of breath) 4/15/2016    Statin intolerance     Tendinopathy of right shoulder     Tremor 2012    Warfarin anticoagulation      Past Surgical History:   Procedure Laterality Date     SECTION      FOOT SURGERY      HEMORRHOID SURGERY  2014    Dr. Adriano Zurita Left 2013    ear basal cell    NERVE BLOCK Right 2016    [facility] MD  SUPRASCAPULAR NB PULSED RF    OTHER SURGICAL HISTORY  14    [facility] NERVE BLOCK MEDIAN BRANCH    VENA CAVA FILTER PLACEMENT       Social History     Social History    Marital status:      Spouse name: N/A    Number of children: N/A    Years of education: N/A     Occupational History    Not on file. Social History Main Topics    Smoking status: Former Smoker     Years: 43.00     Types: Cigarettes     Quit date: 1992    Smokeless tobacco: Never Used    Alcohol use Yes      Comment: rare    Drug use: No    Sexual activity: Not on file     Other Topics Concern    Not on file     Social History Narrative    Lives with , has first floor bedroom and bathroom as they live [place] a ranch.      Family History   Problem Relation Age of Onset   [facility] Cancer Mother     Kidney Disease Mother     Stroke Mother     Cancer Father     Arthritis Sister     Cancer Sister     Diabetes Sister     Arthritis Brother     Cancer Brother     Diabetes Brother     Heart Disease Brother      Allergies   Allergen Reactions    Bactrim      Trouble sleeping    Doxycycline     Fenofibrate     Lipitor     Pcn [Penicillins] Swelling     Current Outpatient Prescriptions on File Prior to Visit   Medication Sig Dispense Refill    furosemide (LASIX) 20 MG tablet TAKE 2 tablets in AM and 1 additional tablet in the afternoon if needed 90 tablet 3    DULoxetine (CYMBALTA) 60 MG extended release capsule Take 1 capsule by mouth daily 30 capsule 3    primidone (MYSOLINE) 50 MG tablet TAKE 1 TABLET NIGHTLY 90 tablet 3    OXYGEN Portable concentrator for oxygen use at 3 L with ambulation 1 Units 0    Blood Glucose Monitoring Suppl (ACCU-CHEK OMAR) MAURICIO USE TO TEST ONCE DAILY  0    ACCU-CHEK OMAR PLUS strip TEST ONCE DAILY  11    TRUEPLUS LANCETS 28G MISC TEST ONCE DAILY  11    diclofenac sodium 1 % GEL Apply 2 g topically 2 times daily 1 Tube 3    nystatin (MYCOSTATIN) 202164 UNIT/GM powder Apply 3 times daily. 56.7 Bottle 3    nystatin (MYCOSTATIN) 999390 UNIT/ML suspension Take 5 milliliters by mouth 4 times daily 180 mL 2    SYMBICORT 160-4.5 MCG/ACT AERO USE 2 INHALATIONS TWICE A DAY 30.6 g 3    lisinopril (PRINIVIL;ZESTRIL) 5 MG tablet TAKE 1 TABLET DAILY 90 tablet 3    diltiazem (CARDIZEM CD) 360 MG extended release capsule TAKE 1 CAPSULE DAILY 90 capsule 3    prochlorperazine (COMPAZINE) 10 MG tablet Take 1 tablet by mouth every 8 hours as needed (nausea) 120 tablet 0    potassium chloride (KLOR-CON M) 20 MEQ extended release tablet TAKE 1 TABLET DAILY 90 tablet 3    esomeprazole (NEXIUM) 40 MG delayed release capsule TAKE ONE CAPSULE BY MOUTH EVERY MORNING BEFORE BREAKFAST 30 capsule 11    Dextromethorphan-Guaifenesin (MUCINEX DM)  MG TB12 Take 1 tablet by mouth 2 times daily as needed (cough) 28 tablet 1    loratadine (CLARITIN) 10 MG tablet Take 1 tablet by mouth daily 30 tablet 0    Calcium Carbonate-Vitamin D (CALCIUM 600 + D PO) Take 1 tablet by mouth 2 times daily.  fluticasone (FLONASE) 50 MCG/ACT nasal spray 1 spray by Nasal route 2 times daily. No current facility-administered medications on file prior to visit.           Review of Systems      Objective:   /76   Pulse 78   Temp 98 °F (36.7 °C)   Resp 16   LMP  (LMP Unknown)   SpO2 99%     Physical Exam              Plan:   Fatigue  - Discussed pacing

## 2018-08-07 ENCOUNTER — TELEPHONE (OUTPATIENT)
Dept: PALLATIVE CARE | Age: 83
End: 2018-08-07

## 2018-08-08 RX ORDER — NYSTATIN 100000 [USP'U]/G
POWDER TOPICAL
Qty: 56.7 G | Refills: 5 | Status: SHIPPED | OUTPATIENT
Start: 2018-08-08 | End: 2019-01-01

## 2018-08-24 ENCOUNTER — OFFICE VISIT (OUTPATIENT)
Dept: PALLATIVE CARE | Age: 83
End: 2018-08-24
Payer: MEDICARE

## 2018-08-24 VITALS
DIASTOLIC BLOOD PRESSURE: 78 MMHG | SYSTOLIC BLOOD PRESSURE: 135 MMHG | OXYGEN SATURATION: 95 % | RESPIRATION RATE: 16 BRPM | TEMPERATURE: 98.4 F | HEART RATE: 77 BPM

## 2018-08-24 DIAGNOSIS — R06.02 SOB (SHORTNESS OF BREATH): Primary | ICD-10-CM

## 2018-08-24 DIAGNOSIS — Z51.5 PALLIATIVE CARE ENCOUNTER: ICD-10-CM

## 2018-08-24 DIAGNOSIS — J34.89 SINUS PAIN: ICD-10-CM

## 2018-08-24 DIAGNOSIS — G62.9 NEUROPATHY: ICD-10-CM

## 2018-08-24 DIAGNOSIS — R06.2 WHEEZE: ICD-10-CM

## 2018-08-24 DIAGNOSIS — R19.7 DIARRHEA, UNSPECIFIED TYPE: ICD-10-CM

## 2018-08-24 DIAGNOSIS — M25.50 ARTHRALGIA, UNSPECIFIED JOINT: ICD-10-CM

## 2018-08-24 PROCEDURE — 99350 HOME/RES VST EST HIGH MDM 60: CPT | Performed by: NURSE PRACTITIONER

## 2018-08-24 RX ORDER — LEVOFLOXACIN 500 MG/1
500 TABLET, FILM COATED ORAL DAILY
Qty: 7 TABLET | Refills: 0 | Status: SHIPPED | OUTPATIENT
Start: 2018-08-24 | End: 2018-09-18

## 2018-08-24 RX ORDER — METHYLPREDNISOLONE 4 MG/1
TABLET ORAL
Qty: 1 KIT | Refills: 0 | Status: CANCELLED | OUTPATIENT
Start: 2018-08-24

## 2018-08-24 RX ORDER — METHYLPREDNISOLONE 4 MG/1
TABLET ORAL
Qty: 1 KIT | Refills: 0 | Status: SHIPPED | OUTPATIENT
Start: 2018-08-24 | End: 2018-11-05 | Stop reason: ALTCHOICE

## 2018-08-24 RX ORDER — AZITHROMYCIN 250 MG/1
TABLET, FILM COATED ORAL
Qty: 1 PACKET | Refills: 0 | Status: CANCELLED | OUTPATIENT
Start: 2018-08-24

## 2018-08-24 ASSESSMENT — ENCOUNTER SYMPTOMS
VOICE CHANGE: 0
WHEEZING: 1
APNEA: 0
COLOR CHANGE: 0
VOMITING: 0
SINUS PRESSURE: 1
BACK PAIN: 1
CHEST TIGHTNESS: 0
DIARRHEA: 0
SINUS PAIN: 1
SHORTNESS OF BREATH: 1
EYE DISCHARGE: 0
RECTAL PAIN: 0
TROUBLE SWALLOWING: 0
CHOKING: 0
STRIDOR: 0
ABDOMINAL DISTENTION: 0
ABDOMINAL PAIN: 0
NAUSEA: 0
CONSTIPATION: 0
BLOOD IN STOOL: 0
SORE THROAT: 0
COUGH: 0
RHINORRHEA: 1
ANAL BLEEDING: 0

## 2018-08-24 NOTE — PROGRESS NOTES
MG tablet Take 0.5 mg by mouth. Bedelia Sol diclofenac sodium 1 % GEL apply 4 grams to affected area 4 times daily as needed.  metFORMIN (GLUCOPHAGE-XR) 750 MG extended release tablet Take 750 mg by mouth      [START ON 8/31/2018] oxyCODONE 5 MG capsule Take 5 mg by mouth. Bedelia Sol gabapentin (NEURONTIN) 300 MG capsule Take 300 mg by mouth. Bedelia Sol rivaroxaban (XARELTO) 20 MG TABS tablet Take 20 mg by mouth      ondansetron (ZOFRAN) 4 MG tablet Take 1 tablet by mouth every 8 hours as needed for Nausea or Vomiting 20 tablet 3    furosemide (LASIX) 20 MG tablet TAKE 2 tablets in AM and 1 additional tablet in the afternoon if needed 90 tablet 3    DULoxetine (CYMBALTA) 60 MG extended release capsule Take 1 capsule by mouth daily 30 capsule 3    primidone (MYSOLINE) 50 MG tablet TAKE 1 TABLET NIGHTLY 90 tablet 3    OXYGEN Portable concentrator for oxygen use at 3 L with ambulation 1 Units 0    Blood Glucose Monitoring Suppl (ACCU-CHEK OMAR) MAURICIO USE TO TEST ONCE DAILY  0    ACCU-CHEK OMAR PLUS strip TEST ONCE DAILY  11    TRUEPLUS LANCETS 28G MISC TEST ONCE DAILY  11    diclofenac sodium 1 % GEL Apply 2 g topically 2 times daily 1 Tube 3    nystatin (MYCOSTATIN) 997919 UNIT/ML suspension Take 5 milliliters by mouth 4 times daily 180 mL 2    SYMBICORT 160-4.5 MCG/ACT AERO USE 2 INHALATIONS TWICE A DAY 30.6 g 3    lisinopril (PRINIVIL;ZESTRIL) 5 MG tablet TAKE 1 TABLET DAILY 90 tablet 3    diltiazem (CARDIZEM CD) 360 MG extended release capsule TAKE 1 CAPSULE DAILY 90 capsule 3    prochlorperazine (COMPAZINE) 10 MG tablet Take 1 tablet by mouth every 8 hours as needed (nausea) 120 tablet 0    potassium chloride (KLOR-CON M) 20 MEQ extended release tablet TAKE 1 TABLET DAILY 90 tablet 3    esomeprazole (NEXIUM) 40 MG delayed release capsule TAKE ONE CAPSULE BY MOUTH EVERY MORNING BEFORE BREAKFAST 30 capsule 11    Dextromethorphan-Guaifenesin (MUCINEX DM)  MG TB12 Take 1 tablet by mouth 2 times daily

## 2018-08-29 DIAGNOSIS — K21.9 GASTROESOPHAGEAL REFLUX DISEASE, ESOPHAGITIS PRESENCE NOT SPECIFIED: ICD-10-CM

## 2018-08-29 RX ORDER — ESOMEPRAZOLE MAGNESIUM 40 MG/1
CAPSULE, DELAYED RELEASE ORAL
Qty: 90 CAPSULE | Refills: 3 | Status: SHIPPED | OUTPATIENT
Start: 2018-08-29 | End: 2019-01-01 | Stop reason: SDUPTHER

## 2018-08-29 NOTE — TELEPHONE ENCOUNTER
I sent in a 90 day supply to [facility] but she may need to send it into her mail order pharmacy to get a long-term supply.

## 2018-09-18 ENCOUNTER — OFFICE VISIT (OUTPATIENT)
Dept: FAMILY MEDICINE CLINIC | Age: 83
End: 2018-09-18
Payer: MEDICARE

## 2018-09-18 VITALS
BODY MASS INDEX: 35.71 KG/M2 | SYSTOLIC BLOOD PRESSURE: 130 MMHG | OXYGEN SATURATION: 95 % | HEIGHT: 66 IN | WEIGHT: 222.2 LBS | DIASTOLIC BLOOD PRESSURE: 84 MMHG | HEART RATE: 80 BPM

## 2018-09-18 DIAGNOSIS — L57.0 ACTINIC KERATOSES: ICD-10-CM

## 2018-09-18 DIAGNOSIS — L29.9 PRURITUS OF SKIN: ICD-10-CM

## 2018-09-18 DIAGNOSIS — E11.65 TYPE 2 DIABETES MELLITUS WITH HYPERGLYCEMIA, WITHOUT LONG-TERM CURRENT USE OF INSULIN (HCC): Primary | ICD-10-CM

## 2018-09-18 DIAGNOSIS — E11.65 TYPE 2 DIABETES MELLITUS WITH HYPERGLYCEMIA, WITHOUT LONG-TERM CURRENT USE OF INSULIN (HCC): ICD-10-CM

## 2018-09-18 DIAGNOSIS — Z23 NEEDS FLU SHOT: ICD-10-CM

## 2018-09-18 DIAGNOSIS — J44.9 CHRONIC OBSTRUCTIVE PULMONARY DISEASE, UNSPECIFIED COPD TYPE (HCC): ICD-10-CM

## 2018-09-18 DIAGNOSIS — I10 ESSENTIAL HYPERTENSION: ICD-10-CM

## 2018-09-18 DIAGNOSIS — L82.0 INFLAMED SEBORRHEIC KERATOSIS: ICD-10-CM

## 2018-09-18 DIAGNOSIS — F32.0 CURRENT MILD EPISODE OF MAJOR DEPRESSIVE DISORDER WITHOUT PRIOR EPISODE (HCC): ICD-10-CM

## 2018-09-18 LAB — HBA1C MFR BLD: 7.2 % (ref 4.8–5.9)

## 2018-09-18 PROCEDURE — 90662 IIV NO PRSV INCREASED AG IM: CPT | Performed by: FAMILY MEDICINE

## 2018-09-18 PROCEDURE — G0008 ADMIN INFLUENZA VIRUS VAC: HCPCS | Performed by: FAMILY MEDICINE

## 2018-09-18 PROCEDURE — 17110 DESTRUCTION B9 LES UP TO 14: CPT | Performed by: FAMILY MEDICINE

## 2018-09-18 PROCEDURE — G8510 SCR DEP NEG, NO PLAN REQD: HCPCS | Performed by: FAMILY MEDICINE

## 2018-09-18 PROCEDURE — 99214 OFFICE O/P EST MOD 30 MIN: CPT | Performed by: FAMILY MEDICINE

## 2018-09-18 ASSESSMENT — PATIENT HEALTH QUESTIONNAIRE - PHQ9
SUM OF ALL RESPONSES TO PHQ QUESTIONS 1-9: 0
SUM OF ALL RESPONSES TO PHQ9 QUESTIONS 1 & 2: 0
1. LITTLE INTEREST OR PLEASURE IN DOING THINGS: 0
SUM OF ALL RESPONSES TO PHQ QUESTIONS 1-9: 0
2. FEELING DOWN, DEPRESSED OR HOPELESS: 0

## 2018-09-18 ASSESSMENT — ENCOUNTER SYMPTOMS
ABDOMINAL PAIN: 0
BLURRED VISION: 0

## 2018-09-18 NOTE — PATIENT INSTRUCTIONS
Vaccine Information Sheet, \"Influenza - Inactivated\"  given to Gerhard Anderson, or parent/legal guardian of  Gerhard Anderson and verbalized understanding. Patient responses:    Have you ever had a reaction to a flu vaccine? No  Are you able to eat eggs without adverse effects? Yes  Do you have any current illness? No  Have you ever had Guillian Chester Syndrome? No    Flu vaccine given per order. Please see immunization tab.

## 2018-09-21 ENCOUNTER — TELEPHONE (OUTPATIENT)
Dept: FAMILY MEDICINE CLINIC | Age: 83
End: 2018-09-21

## 2018-09-21 RX ORDER — LEVOFLOXACIN 500 MG/1
500 TABLET, FILM COATED ORAL DAILY
Qty: 10 TABLET | Refills: 0 | Status: SHIPPED | OUTPATIENT
Start: 2018-09-21 | End: 2018-10-01

## 2018-09-21 NOTE — TELEPHONE ENCOUNTER
DR RICHARDS pt, Pt states that she started the z dalila she is three days in, and she feels as though she has gotten worse. Pt states she now has a cough and it is productive dark green pt would like something called in to help. She states that she gets pneumonia easilly. Please advise.

## 2018-09-24 ENCOUNTER — TELEPHONE (OUTPATIENT)
Dept: PALLATIVE CARE | Age: 83
End: 2018-09-24

## 2018-09-24 ENCOUNTER — TELEPHONE (OUTPATIENT)
Dept: FAMILY MEDICINE CLINIC | Age: 83
End: 2018-09-24

## 2018-09-24 DIAGNOSIS — J44.1 CHRONIC OBSTRUCTIVE PULMONARY DISEASE WITH ACUTE EXACERBATION (HCC): Primary | ICD-10-CM

## 2018-09-24 DIAGNOSIS — J18.9 PNEUMONIA DUE TO INFECTIOUS ORGANISM, UNSPECIFIED LATERALITY, UNSPECIFIED PART OF LUNG: ICD-10-CM

## 2018-09-24 DIAGNOSIS — R06.2 WHEEZE: ICD-10-CM

## 2018-09-24 DIAGNOSIS — R06.02 SOB (SHORTNESS OF BREATH): ICD-10-CM

## 2018-09-24 RX ORDER — IPRATROPIUM BROMIDE AND ALBUTEROL SULFATE 2.5; .5 MG/3ML; MG/3ML
1 SOLUTION RESPIRATORY (INHALATION) EVERY 4 HOURS PRN
Qty: 540 ML | Refills: 3 | Status: SHIPPED | OUTPATIENT
Start: 2018-09-24 | End: 2019-01-01

## 2018-09-24 NOTE — TELEPHONE ENCOUNTER
10-    1317: Call from  Amaury [place], states pt is currently being treated for pneumonia; taking antibiotics as ordered. Amaury [place] states pt's providers keep telling him to also give breathing treatments as ordered but he just ran out of DuoNebs. Asks if more can be called into [facility]. Last Rx'd April 17, 2017. He states he recently changed home phone number and does not know new number- asked him if wife cell phone number is still active, he states yes. Advised him that I will contact Dr Apolonia Acosta and will call him back on wife's cell phone. 1330: Spoke to Dr Apolonia Acosta, states ok to re-order Duonebs x 1 box and have CNP f/u Monday. Last order found in Epic as Duoneb TID. Called into [facility]. [id number]: Multiple attempts to call wife's cell phone, goes directly to voicemail, unable to leave message. 1431: Call from  Amaury Montana patched thru from answering service, aware DuoNeb x 1 box has been called into [facility]; aware PC will F/U. Grateful for assistance.      Yenifer Treadwell, RN

## 2018-10-05 ENCOUNTER — OFFICE VISIT (OUTPATIENT)
Dept: PALLATIVE CARE | Age: 83
End: 2018-10-05
Payer: MEDICARE

## 2018-10-05 VITALS
DIASTOLIC BLOOD PRESSURE: 68 MMHG | HEART RATE: 80 BPM | SYSTOLIC BLOOD PRESSURE: 130 MMHG | OXYGEN SATURATION: 99 % | RESPIRATION RATE: 16 BRPM

## 2018-10-05 DIAGNOSIS — J44.9 CHRONIC OBSTRUCTIVE PULMONARY DISEASE, UNSPECIFIED COPD TYPE (HCC): ICD-10-CM

## 2018-10-05 DIAGNOSIS — R06.02 SOB (SHORTNESS OF BREATH): Primary | ICD-10-CM

## 2018-10-05 PROCEDURE — 99348 HOME/RES VST EST LOW MDM 30: CPT | Performed by: NURSE PRACTITIONER

## 2018-10-05 RX ORDER — GABAPENTIN 300 MG/1
CAPSULE ORAL
COMMUNITY
Start: 2018-05-25 | End: 2019-01-04

## 2018-10-05 RX ORDER — PREDNISONE 10 MG/1
10 TABLET ORAL DAILY
Qty: 30 TABLET | Refills: 0 | Status: SHIPPED | OUTPATIENT
Start: 2018-10-05 | End: 2018-10-17

## 2018-10-05 RX ORDER — OXYCODONE HYDROCHLORIDE 5 MG/1
5 CAPSULE ORAL
COMMUNITY
Start: 2018-10-09 | End: 2018-11-08

## 2018-10-05 RX ORDER — BUPRENORPHINE 20 UG/H
1 PATCH TRANSDERMAL
COMMUNITY
Start: 2018-10-12 | End: 2019-01-04

## 2018-10-05 RX ORDER — LEVOFLOXACIN 500 MG/1
500 TABLET, FILM COATED ORAL DAILY
Qty: 10 TABLET | Refills: 0 | Status: SHIPPED | OUTPATIENT
Start: 2018-10-05 | End: 2018-10-15

## 2018-10-05 ASSESSMENT — ENCOUNTER SYMPTOMS
VOICE CHANGE: 0
RECTAL PAIN: 0
COUGH: 0
APNEA: 0
ANAL BLEEDING: 0
SORE THROAT: 0
COLOR CHANGE: 0
STRIDOR: 0
WHEEZING: 0
CONSTIPATION: 0
CHEST TIGHTNESS: 0
DIARRHEA: 0
SHORTNESS OF BREATH: 1
CHOKING: 0
VOMITING: 0
NAUSEA: 0
TROUBLE SWALLOWING: 0
EYE DISCHARGE: 0
BACK PAIN: 0
ABDOMINAL PAIN: 0
BLOOD IN STOOL: 0
ABDOMINAL DISTENTION: 0

## 2018-10-05 NOTE — PROGRESS NOTES
as needed for Shortness of Breath 540 mL 3    esomeprazole (NEXIUM) 40 MG delayed release capsule TAKE ONE CAPSULE BY MOUTH EVERY MORNING BEFORE BREAKFAST 90 capsule 3    methylPREDNISolone (MEDROL DOSEPACK) 4 MG tablet Take by mouth. 1 kit 0    nystatin (MYCOSTATIN) 982251 UNIT/GM powder Apply 3 times daily. 56.7 g 5    albuterol sulfate  (90 Base) MCG/ACT inhaler Inhale 90 puffs into the lungs      diclofenac sodium 1 % GEL apply 4 grams to affected area 4 times daily as needed.  metFORMIN (GLUCOPHAGE-XR) 750 MG extended release tablet Take 750 mg by mouth      gabapentin (NEURONTIN) 300 MG capsule Take 300 mg by mouth. Ova Ham rivaroxaban (XARELTO) 20 MG TABS tablet Take 20 mg by mouth      furosemide (LASIX) 20 MG tablet TAKE 2 tablets in AM and 1 additional tablet in the afternoon if needed 90 tablet 3    DULoxetine (CYMBALTA) 60 MG extended release capsule Take 1 capsule by mouth daily 30 capsule 3    primidone (MYSOLINE) 50 MG tablet TAKE 1 TABLET NIGHTLY 90 tablet 3    OXYGEN Portable concentrator for oxygen use at 3 L with ambulation 1 Units 0    Blood Glucose Monitoring Suppl (ACCU-CHEK OMAR) MAURICIO USE TO TEST ONCE DAILY  0    ACCU-CHEK OMAR PLUS strip TEST ONCE DAILY  11    TRUEPLUS LANCETS 28G MISC TEST ONCE DAILY  11    diclofenac sodium 1 % GEL Apply 2 g topically 2 times daily 1 Tube 3    nystatin (MYCOSTATIN) 699355 UNIT/ML suspension Take 5 milliliters by mouth 4 times daily 180 mL 2    SYMBICORT 160-4.5 MCG/ACT AERO USE 2 INHALATIONS TWICE A DAY 30.6 g 3    lisinopril (PRINIVIL;ZESTRIL) 5 MG tablet TAKE 1 TABLET DAILY 90 tablet 3    diltiazem (CARDIZEM CD) 360 MG extended release capsule TAKE 1 CAPSULE DAILY 90 capsule 3    prochlorperazine (COMPAZINE) 10 MG tablet Take 1 tablet by mouth every 8 hours as needed (nausea) 120 tablet 0    potassium chloride (KLOR-CON M) 20 MEQ extended release tablet TAKE 1 TABLET DAILY 90 tablet 3    Dextromethorphan-Guaifenesin Objective:   /68   Pulse 80   Resp 16   LMP  (LMP Unknown)   SpO2 99%     Physical Exam   Constitutional: She is oriented to person, place, and time. She appears well-developed and well-nourished. No distress. HENT:   Head: Normocephalic and atraumatic. Right Ear: External ear normal.   Left Ear: External ear normal.   Nose: Nose normal.   Mouth/Throat: Oropharynx is clear and moist. No oropharyngeal exudate. Eyes: Pupils are equal, round, and reactive to light. Conjunctivae and EOM are normal. Right eye exhibits no discharge. Left eye exhibits no discharge. No scleral icterus. Neck: Normal range of motion. Neck supple. No JVD present. No tracheal deviation present. No thyromegaly present. Cardiovascular: Normal rate, regular rhythm and normal heart sounds. Pulmonary/Chest: Effort normal. No accessory muscle usage or stridor. No respiratory distress. She has decreased breath sounds. She has no wheezes. She has no rales. She exhibits no tenderness. Abdominal: Soft. Bowel sounds are normal. She exhibits no distension and no mass. There is no tenderness. There is no rebound and no guarding. Musculoskeletal: Normal range of motion. She exhibits no edema, tenderness or deformity. Lymphadenopathy:     She has no cervical adenopathy. Neurological: She is alert and oriented to person, place, and time. Skin: Skin is warm and dry. No rash noted. She is not diaphoretic. No erythema. Psychiatric: She has a normal mood and affect. Her behavior is normal. Thought content normal. Cognition and memory are normal.         Assessment:       Diagnosis Orders   1. SOB (shortness of breath)     2.  Chronic obstructive pulmonary disease, unspecified [facility]             Plan:      Orders Placed This Encounter   Medications    levofloxacin (LEVAQUIN) 500 MG tablet     Sig: Take 1 tablet by mouth daily for 10 days     Dispense:  10 tablet     Refill:  0     COPD ER PACK    predniSONE (DELTASONE) 10 MG tablet     Sig: Take 1 tablet by mouth daily for 12 days Take 4 tabs x 3 days then 3 tabs x 3 days then 2 tabs x 3 days then 1 tab x 3 days. Dispense:  30 tablet     Refill:  0     COPD ER PACK     SOB  - Continue COPD Directed therapies   - Call for increased SOB, cough, sputum production, excessive fatigue, fever, chills, or myalgias  - Gentle exercise as tolerated  -  Rinse out mouth after inhaler use.   - COPD ER PACK in place: Call prior to initializing    Follow up in 4-6 weeks, sooner TALI Blanco - CNP

## 2018-10-22 RX ORDER — DULOXETIN HYDROCHLORIDE 60 MG/1
60 CAPSULE, DELAYED RELEASE ORAL DAILY
Qty: 30 CAPSULE | Refills: 3 | Status: SHIPPED | OUTPATIENT
Start: 2018-10-22 | End: 2019-02-14 | Stop reason: SDUPTHER

## 2018-11-05 ENCOUNTER — OFFICE VISIT (OUTPATIENT)
Dept: PALLATIVE CARE | Age: 83
End: 2018-11-05
Payer: MEDICARE

## 2018-11-05 DIAGNOSIS — R19.7 DIARRHEA, UNSPECIFIED TYPE: Primary | ICD-10-CM

## 2018-11-05 DIAGNOSIS — R11.0 NAUSEA: ICD-10-CM

## 2018-11-05 DIAGNOSIS — Z51.5 PALLIATIVE CARE PATIENT: ICD-10-CM

## 2018-11-05 DIAGNOSIS — C90.00 MULTIPLE MYELOMA, REMISSION STATUS UNSPECIFIED (HCC): ICD-10-CM

## 2018-11-05 DIAGNOSIS — J44.9 CHRONIC OBSTRUCTIVE PULMONARY DISEASE, UNSPECIFIED COPD TYPE (HCC): ICD-10-CM

## 2018-11-05 DIAGNOSIS — R06.02 SOB (SHORTNESS OF BREATH): ICD-10-CM

## 2018-11-05 PROCEDURE — 99348 HOME/RES VST EST LOW MDM 30: CPT | Performed by: NURSE PRACTITIONER

## 2018-11-05 RX ORDER — POTASSIUM CHLORIDE 20 MEQ/1
TABLET, EXTENDED RELEASE ORAL
COMMUNITY
Start: 2018-10-11 | End: 2019-01-01 | Stop reason: SDUPTHER

## 2018-11-05 ASSESSMENT — ENCOUNTER SYMPTOMS
SHORTNESS OF BREATH: 1
WHEEZING: 0
BACK PAIN: 1
CONSTIPATION: 0
RECTAL PAIN: 0
STRIDOR: 0
BLOOD IN STOOL: 0
ABDOMINAL DISTENTION: 0
DIARRHEA: 1
NAUSEA: 1
ANAL BLEEDING: 0
APNEA: 0
VOMITING: 0
SORE THROAT: 0
COLOR CHANGE: 0
CHEST TIGHTNESS: 0
EYE DISCHARGE: 0
TROUBLE SWALLOWING: 0
COUGH: 0
ABDOMINAL PAIN: 0
VOICE CHANGE: 0
CHOKING: 0

## 2018-11-05 NOTE — PROGRESS NOTES
daily  - Imodium PRN  - Follow up with PCP to rule out GI Bleed    SOB  - Continue COPD Directed therapies   - Call for increased SOB, cough, sputum production, excessive fatigue, fever, chills, or myalgias  - Gentle exercise as tolerated  -  Rinse out mouth after inhaler use.   - COPD ER PACK in place: Call prior to initializing    TALI Antoine - CNP

## 2018-12-03 ENCOUNTER — OFFICE VISIT (OUTPATIENT)
Dept: PALLATIVE CARE | Age: 83
End: 2018-12-03
Payer: MEDICARE

## 2018-12-03 VITALS
OXYGEN SATURATION: 97 % | HEART RATE: 77 BPM | SYSTOLIC BLOOD PRESSURE: 124 MMHG | DIASTOLIC BLOOD PRESSURE: 74 MMHG | RESPIRATION RATE: 16 BRPM

## 2018-12-03 DIAGNOSIS — J44.9 CHRONIC OBSTRUCTIVE PULMONARY DISEASE, UNSPECIFIED COPD TYPE (HCC): ICD-10-CM

## 2018-12-03 DIAGNOSIS — R06.02 SOB (SHORTNESS OF BREATH): ICD-10-CM

## 2018-12-03 DIAGNOSIS — F41.9 ANXIETY: Primary | ICD-10-CM

## 2018-12-03 DIAGNOSIS — C90.00 MULTIPLE MYELOMA, REMISSION STATUS UNSPECIFIED (HCC): ICD-10-CM

## 2018-12-03 PROCEDURE — 99349 HOME/RES VST EST MOD MDM 40: CPT | Performed by: NURSE PRACTITIONER

## 2018-12-03 RX ORDER — METHYLPREDNISOLONE 4 MG/1
TABLET ORAL
Qty: 1 KIT | Refills: 0 | Status: SHIPPED | OUTPATIENT
Start: 2018-12-03 | End: 2018-12-09

## 2018-12-03 RX ORDER — OXYCODONE HYDROCHLORIDE 5 MG/1
5 CAPSULE ORAL
COMMUNITY
Start: 2018-10-09 | End: 2019-01-04

## 2018-12-03 RX ORDER — LEVOFLOXACIN 500 MG/1
500 TABLET, FILM COATED ORAL DAILY
Qty: 7 TABLET | Refills: 0 | Status: SHIPPED | OUTPATIENT
Start: 2018-12-03 | End: 2018-12-10

## 2018-12-03 ASSESSMENT — ENCOUNTER SYMPTOMS
SHORTNESS OF BREATH: 1
RECTAL PAIN: 0
COUGH: 0
NAUSEA: 0
BLOOD IN STOOL: 0
TROUBLE SWALLOWING: 0
CHEST TIGHTNESS: 0
WHEEZING: 1
ABDOMINAL DISTENTION: 0
CHOKING: 0
COLOR CHANGE: 0
APNEA: 0
STRIDOR: 0
BACK PAIN: 0
SORE THROAT: 0
ANAL BLEEDING: 0
ABDOMINAL PAIN: 0
DIARRHEA: 0
EYE DISCHARGE: 0
CONSTIPATION: 0
VOICE CHANGE: 0
VOMITING: 0

## 2018-12-03 NOTE — PROGRESS NOTES
Subjective:      Patient Id:  Adrián Louie is a 80 y.o. female who presents today with   Chief Complaint   Patient presents with    Diarrhea    Shortness of Breath    Anxiety          HPI     Seen at home for symptom management follow up RT COPD and MM, demeanor calm and relaxed, NAD, no sedation. Seen in the home setting due to disease related symptoms and debility create heavy physical and financial burden to get to a clinic setting. Rosa Gil was treated last week for PNA with Levaquin, she did not use her medrol dose pack along with the Levaquin as she was not sure what it was. She is no longer coughing or fatigued, but is still wheezing and \"feels tight\", even with duo nebs. She has been experiencing more daytime anxiety and increased SOB, she found an old bottle of Alprazolam in her home which she started to take and it improved both symptoms, she asks if I can write a script for this. Negative Gi or  complaints, appetite and weight stable.                   Past Medical History:   Diagnosis Date    A-fib [facility])     Actinic keratoses     Allergic rhinitis, cause unspecified 4/12/2012    Anxiety 4/12/2012    Arthritis of right shoulder region     Asthma     Candida esophagitis (HCC)     GI-Dr. Annie Tobar    Chemotherapy-induced neuropathy (HCC)     Chronic pain syndrome     CKD (chronic kidney disease) stage 3, GFR 30-59 ml/min 9/29/2014    COPD [facility]     Depression     Diabetes ([facility] 08/2017    Diaphoresis 4/15/2016    Dyslipidemia     unable to tolerate statins/tricor    Dysphagia     Fatigue     FH: CAD (coronary artery disease) 5/23/2012    GERD (gastroesophageal reflux disease)     History of DVT of lower extremity     HTN (hypertension)     Hypogammaglobulinemia ([facility] 10/16/2015    Insomnia 4/12/2012    Intertrigo     Labral tear of shoulder     right    Lipoma     Low back pain 1/7/2015    Lower extremity edema 4/12/2012    TABLET DAILY 90 tablet 3    potassium chloride (KLOR-CON M) 20 MEQ extended release tablet TAKE 1 TABLET DAILY      DULoxetine (CYMBALTA) 60 MG extended release capsule Take 1 capsule by mouth daily 30 capsule 3    buprenorphine 20 MCG/HR PTWK Place 1 patch onto the skin. [employer] gabapentin (NEURONTIN) 300 MG capsule take one cap four times a day      ipratropium-albuterol (DUONEB) 0.5-2.5 (3) MG/3ML SOLN nebulizer solution Inhale 3 mLs into the lungs every 4 hours as needed for Shortness of Breath 540 mL 3    esomeprazole (NEXIUM) 40 MG delayed release capsule TAKE ONE CAPSULE BY MOUTH EVERY MORNING BEFORE BREAKFAST 90 capsule 3    nystatin (MYCOSTATIN) 304023 UNIT/GM powder Apply 3 times daily. 56.7 g 5    diclofenac sodium 1 % GEL apply 4 grams to affected area 4 times daily as needed.       metFORMIN (GLUCOPHAGE-XR) 750 MG extended release tablet Take 750 mg by mouth      rivaroxaban (XARELTO) 20 MG TABS tablet Take 20 mg by mouth      furosemide (LASIX) 20 MG tablet TAKE 2 tablets in AM and 1 additional tablet in the afternoon if needed 90 tablet 3    primidone (MYSOLINE) 50 MG tablet TAKE 1 TABLET NIGHTLY 90 tablet 3    OXYGEN Portable concentrator for oxygen use at 3 L with ambulation 1 Units 0    Blood Glucose Monitoring Suppl (ACCU-CHEK OMAR) MAURICIO USE TO TEST ONCE DAILY  0    ACCU-CHEK OMAR PLUS strip TEST ONCE DAILY  11    TRUEPLUS LANCETS 28G MISC TEST ONCE DAILY  11    nystatin (MYCOSTATIN) 206837 UNIT/ML suspension Take 5 milliliters by mouth 4 times daily 180 mL 2    lisinopril (PRINIVIL;ZESTRIL) 5 MG tablet TAKE 1 TABLET DAILY 90 tablet 3    diltiazem (CARDIZEM CD) 360 MG extended release capsule TAKE 1 CAPSULE DAILY 90 capsule 3    prochlorperazine (COMPAZINE) 10 MG tablet Take 1 tablet by mouth every 8 hours as needed (nausea) 120 tablet 0    Dextromethorphan-Guaifenesin (MUCINEX DM)  MG TB12 Take 1 tablet by mouth 2 times daily as needed (cough) 28 tablet 1    Calcium out mouth after inhaler use. - COPD ER PACK in place: Call prior to initializing      Anxiety  - Much discussion on how she should not self medicate, call me for increased symptoms. Fausto Rutledge did not realize Alprazolam was a benzo, I pointed out she is already using Clonazepam, primidone, and opioids, she is high risk for permanent disability or death from accidental overdose.  - Continue Duloxetine 60 mg po daily  - Increase Clonazepam to 0.5 mg po every 12 hours, hold for excessive sedation  - Call for uncontrolled symptoms      Orders Placed This Encounter   Medications    levofloxacin (LEVAQUIN) 500 MG tablet     Sig: Take 1 tablet by mouth daily for 7 days     Dispense:  7 tablet     Refill:  0     COPD ER PACK    methylPREDNISolone (MEDROL DOSEPACK) 4 MG tablet     Sig: Take by mouth. Dispense:  1 kit     Refill:  0     COPD ER PACK       No Follow-up on file.     Tamy Guy, APRN - CNP

## 2018-12-10 DIAGNOSIS — I50.9 CONGESTIVE HEART FAILURE (HCC): ICD-10-CM

## 2018-12-10 DIAGNOSIS — R60.9 EDEMA, UNSPECIFIED TYPE: ICD-10-CM

## 2018-12-10 NOTE — TELEPHONE ENCOUNTER
LOV Sept. Documentation states follow up in Dec. Called pt and she states that she will be going to [place] office. Can we please do 30 day?

## 2018-12-11 RX ORDER — FUROSEMIDE 20 MG/1
TABLET ORAL
Qty: 90 TABLET | Refills: 0 | Status: SHIPPED | OUTPATIENT
Start: 2018-12-11 | End: 2019-01-01 | Stop reason: SDUPTHER

## 2018-12-18 ENCOUNTER — TELEPHONE (OUTPATIENT)
Dept: PALLATIVE CARE | Age: 83
End: 2018-12-18

## 2018-12-18 DIAGNOSIS — R60.9 EDEMA, UNSPECIFIED TYPE: ICD-10-CM

## 2018-12-18 DIAGNOSIS — F41.9 ANXIETY: ICD-10-CM

## 2018-12-18 DIAGNOSIS — C90.00 MULTIPLE MYELOMA, REMISSION STATUS UNSPECIFIED (HCC): ICD-10-CM

## 2018-12-18 DIAGNOSIS — R06.02 SOB (SHORTNESS OF BREATH): ICD-10-CM

## 2018-12-18 DIAGNOSIS — Z51.5 PALLIATIVE CARE PATIENT: Primary | ICD-10-CM

## 2018-12-18 DIAGNOSIS — J44.9 CHRONIC OBSTRUCTIVE PULMONARY DISEASE, UNSPECIFIED COPD TYPE (HCC): ICD-10-CM

## 2018-12-18 RX ORDER — CLONAZEPAM 0.5 MG/1
0.5 TABLET ORAL 2 TIMES DAILY PRN
Qty: 60 TABLET | Refills: 0 | Status: SHIPPED | OUTPATIENT
Start: 2018-12-18 | End: 2019-01-01

## 2018-12-21 RX ORDER — METFORMIN HYDROCHLORIDE 750 MG/1
750 TABLET, EXTENDED RELEASE ORAL
Qty: 30 TABLET | OUTPATIENT
Start: 2018-12-21

## 2018-12-26 RX ORDER — METFORMIN HYDROCHLORIDE 750 MG/1
750 TABLET, EXTENDED RELEASE ORAL 2 TIMES DAILY
Qty: 60 TABLET | Refills: 0 | Status: SHIPPED | OUTPATIENT
Start: 2018-12-26 | End: 2019-02-06 | Stop reason: SDUPTHER

## 2019-01-01 ENCOUNTER — TELEPHONE (OUTPATIENT)
Dept: PALLATIVE CARE | Age: 84
End: 2019-01-01

## 2019-01-01 ENCOUNTER — HOSPITAL ENCOUNTER (INPATIENT)
Age: 84
LOS: 4 days | Discharge: NURSING, REHAB OR LONG-TERM CARE | DRG: 871 | End: 2019-03-22
Attending: EMERGENCY MEDICINE
Payer: MEDICARE

## 2019-01-01 ENCOUNTER — OFFICE VISIT (OUTPATIENT)
Dept: PALLATIVE CARE | Age: 84
End: 2019-01-01
Payer: MEDICARE

## 2019-01-01 ENCOUNTER — APPOINTMENT (OUTPATIENT)
Dept: GENERAL RADIOLOGY | Age: 84
DRG: 871 | End: 2019-01-01
Payer: MEDICARE

## 2019-01-01 ENCOUNTER — HOSPITAL ENCOUNTER (INPATIENT)
Age: 84
LOS: 5 days | Discharge: HOME HEALTH | DRG: 193 | End: 2019-10-20
Attending: INTERNAL MEDICINE | Admitting: INTERNAL MEDICINE
Payer: MEDICARE

## 2019-01-01 ENCOUNTER — TELEPHONE (OUTPATIENT)
Dept: FAMILY MEDICINE CLINIC | Age: 84
End: 2019-01-01

## 2019-01-01 ENCOUNTER — OFFICE VISIT (OUTPATIENT)
Dept: FAMILY MEDICINE CLINIC | Age: 84
End: 2019-01-01
Payer: MEDICARE

## 2019-01-01 ENCOUNTER — OFFICE VISIT (OUTPATIENT)
Dept: SURGERY | Age: 84
End: 2019-01-01
Payer: MEDICARE

## 2019-01-01 ENCOUNTER — APPOINTMENT (OUTPATIENT)
Dept: GENERAL RADIOLOGY | Age: 84
DRG: 193 | End: 2019-01-01
Payer: MEDICARE

## 2019-01-01 VITALS
DIASTOLIC BLOOD PRESSURE: 76 MMHG | OXYGEN SATURATION: 95 % | HEART RATE: 84 BPM | RESPIRATION RATE: 16 BRPM | SYSTOLIC BLOOD PRESSURE: 120 MMHG

## 2019-01-01 VITALS
DIASTOLIC BLOOD PRESSURE: 82 MMHG | OXYGEN SATURATION: 95 % | HEART RATE: 99 BPM | SYSTOLIC BLOOD PRESSURE: 136 MMHG | TEMPERATURE: 98 F

## 2019-01-01 VITALS
HEIGHT: 66 IN | TEMPERATURE: 96.5 F | HEART RATE: 106 BPM | BODY MASS INDEX: 36.33 KG/M2 | OXYGEN SATURATION: 95 % | DIASTOLIC BLOOD PRESSURE: 82 MMHG | SYSTOLIC BLOOD PRESSURE: 138 MMHG

## 2019-01-01 VITALS
TEMPERATURE: 98.1 F | RESPIRATION RATE: 18 BRPM | OXYGEN SATURATION: 100 % | HEART RATE: 55 BPM | BODY MASS INDEX: 33 KG/M2 | WEIGHT: 198.3 LBS | DIASTOLIC BLOOD PRESSURE: 53 MMHG | SYSTOLIC BLOOD PRESSURE: 113 MMHG

## 2019-01-01 VITALS
SYSTOLIC BLOOD PRESSURE: 160 MMHG | DIASTOLIC BLOOD PRESSURE: 100 MMHG | RESPIRATION RATE: 16 BRPM | HEART RATE: 90 BPM | OXYGEN SATURATION: 95 %

## 2019-01-01 VITALS
BODY MASS INDEX: 33.43 KG/M2 | TEMPERATURE: 96.1 F | WEIGHT: 208 LBS | HEIGHT: 66 IN | SYSTOLIC BLOOD PRESSURE: 112 MMHG | DIASTOLIC BLOOD PRESSURE: 64 MMHG | OXYGEN SATURATION: 95 % | HEART RATE: 72 BPM

## 2019-01-01 VITALS
OXYGEN SATURATION: 100 % | WEIGHT: 221.7 LBS | BODY MASS INDEX: 36.94 KG/M2 | SYSTOLIC BLOOD PRESSURE: 150 MMHG | RESPIRATION RATE: 18 BRPM | DIASTOLIC BLOOD PRESSURE: 66 MMHG | HEIGHT: 65 IN | TEMPERATURE: 98.6 F | HEART RATE: 106 BPM

## 2019-01-01 VITALS
HEART RATE: 70 BPM | OXYGEN SATURATION: 96 % | TEMPERATURE: 98.3 F | DIASTOLIC BLOOD PRESSURE: 80 MMHG | SYSTOLIC BLOOD PRESSURE: 130 MMHG | RESPIRATION RATE: 16 BRPM

## 2019-01-01 VITALS
DIASTOLIC BLOOD PRESSURE: 67 MMHG | OXYGEN SATURATION: 99 % | SYSTOLIC BLOOD PRESSURE: 130 MMHG | HEART RATE: 66 BPM | TEMPERATURE: 98.3 F | RESPIRATION RATE: 16 BRPM

## 2019-01-01 VITALS
RESPIRATION RATE: 16 BRPM | OXYGEN SATURATION: 99 % | HEART RATE: 76 BPM | SYSTOLIC BLOOD PRESSURE: 130 MMHG | DIASTOLIC BLOOD PRESSURE: 80 MMHG

## 2019-01-01 VITALS
HEART RATE: 108 BPM | BODY MASS INDEX: 36.82 KG/M2 | TEMPERATURE: 96.7 F | WEIGHT: 221 LBS | HEIGHT: 65 IN | OXYGEN SATURATION: 97 %

## 2019-01-01 VITALS
WEIGHT: 196 LBS | HEART RATE: 89 BPM | HEIGHT: 65 IN | SYSTOLIC BLOOD PRESSURE: 152 MMHG | TEMPERATURE: 97.7 F | BODY MASS INDEX: 32.65 KG/M2 | OXYGEN SATURATION: 96 % | DIASTOLIC BLOOD PRESSURE: 82 MMHG

## 2019-01-01 DIAGNOSIS — C90.00 MULTIPLE MYELOMA, REMISSION STATUS UNSPECIFIED (HCC): Primary | ICD-10-CM

## 2019-01-01 DIAGNOSIS — R80.9 TYPE 2 DIABETES MELLITUS WITH MICROALBUMINURIA, UNSPECIFIED WHETHER LONG TERM INSULIN USE (HCC): Primary | ICD-10-CM

## 2019-01-01 DIAGNOSIS — G89.3 NEOPLASM RELATED PAIN: ICD-10-CM

## 2019-01-01 DIAGNOSIS — I10 ESSENTIAL HYPERTENSION: ICD-10-CM

## 2019-01-01 DIAGNOSIS — K92.1 MELENA: ICD-10-CM

## 2019-01-01 DIAGNOSIS — R06.2 WHEEZE: ICD-10-CM

## 2019-01-01 DIAGNOSIS — R06.02 SHORTNESS OF BREATH: ICD-10-CM

## 2019-01-01 DIAGNOSIS — M25.562 CHRONIC PAIN OF BOTH KNEES: ICD-10-CM

## 2019-01-01 DIAGNOSIS — R06.02 SOB (SHORTNESS OF BREATH): ICD-10-CM

## 2019-01-01 DIAGNOSIS — C90.02 MULTIPLE MYELOMA IN RELAPSE (HCC): ICD-10-CM

## 2019-01-01 DIAGNOSIS — Z51.5 PALLIATIVE CARE ENCOUNTER: ICD-10-CM

## 2019-01-01 DIAGNOSIS — E11.65 TYPE 2 DIABETES MELLITUS WITH HYPERGLYCEMIA, WITHOUT LONG-TERM CURRENT USE OF INSULIN (HCC): ICD-10-CM

## 2019-01-01 DIAGNOSIS — R11.0 NAUSEA: ICD-10-CM

## 2019-01-01 DIAGNOSIS — K52.1 DIARRHEA DUE TO DRUG: Primary | ICD-10-CM

## 2019-01-01 DIAGNOSIS — E11.65 TYPE 2 DIABETES MELLITUS WITH HYPERGLYCEMIA, WITHOUT LONG-TERM CURRENT USE OF INSULIN (HCC): Primary | ICD-10-CM

## 2019-01-01 DIAGNOSIS — R19.7 DIARRHEA, UNSPECIFIED TYPE: Primary | ICD-10-CM

## 2019-01-01 DIAGNOSIS — Z85.79 HISTORY OF MULTIPLE MYELOMA: ICD-10-CM

## 2019-01-01 DIAGNOSIS — M25.50 ARTHRALGIA, UNSPECIFIED JOINT: ICD-10-CM

## 2019-01-01 DIAGNOSIS — M25.50 ARTHRALGIA, UNSPECIFIED JOINT: Primary | ICD-10-CM

## 2019-01-01 DIAGNOSIS — G89.29 CHRONIC PAIN OF BOTH KNEES: ICD-10-CM

## 2019-01-01 DIAGNOSIS — I50.9 CONGESTIVE HEART FAILURE (HCC): ICD-10-CM

## 2019-01-01 DIAGNOSIS — N39.0 URINARY TRACT INFECTION WITHOUT HEMATURIA, SITE UNSPECIFIED: Primary | ICD-10-CM

## 2019-01-01 DIAGNOSIS — C90.00 MULTIPLE MYELOMA NOT HAVING ACHIEVED REMISSION (HCC): ICD-10-CM

## 2019-01-01 DIAGNOSIS — E87.20 LACTIC ACID ACIDOSIS: ICD-10-CM

## 2019-01-01 DIAGNOSIS — K21.9 GASTROESOPHAGEAL REFLUX DISEASE, ESOPHAGITIS PRESENCE NOT SPECIFIED: ICD-10-CM

## 2019-01-01 DIAGNOSIS — J44.9 CHRONIC OBSTRUCTIVE PULMONARY DISEASE, UNSPECIFIED COPD TYPE (HCC): ICD-10-CM

## 2019-01-01 DIAGNOSIS — C90.00 MULTIPLE MYELOMA NOT HAVING ACHIEVED REMISSION (HCC): Primary | ICD-10-CM

## 2019-01-01 DIAGNOSIS — B37.9 CANDIDIASIS: ICD-10-CM

## 2019-01-01 DIAGNOSIS — Z78.9 TAKES IRON SUPPLEMENTS: ICD-10-CM

## 2019-01-01 DIAGNOSIS — N39.0 URINARY TRACT INFECTION WITHOUT HEMATURIA, SITE UNSPECIFIED: ICD-10-CM

## 2019-01-01 DIAGNOSIS — I48.91 ATRIAL FIBRILLATION WITH RVR (HCC): ICD-10-CM

## 2019-01-01 DIAGNOSIS — Z51.5 PALLIATIVE CARE ENCOUNTER: Primary | ICD-10-CM

## 2019-01-01 DIAGNOSIS — Z92.21 HISTORY OF CHEMOTHERAPY: ICD-10-CM

## 2019-01-01 DIAGNOSIS — N30.01 ACUTE CYSTITIS WITH HEMATURIA: Primary | ICD-10-CM

## 2019-01-01 DIAGNOSIS — S81.802A WOUND OF LEFT LOWER EXTREMITY, INITIAL ENCOUNTER: ICD-10-CM

## 2019-01-01 DIAGNOSIS — C90.00 MULTIPLE MYELOMA, REMISSION STATUS UNSPECIFIED (HCC): ICD-10-CM

## 2019-01-01 DIAGNOSIS — B37.0 THRUSH: ICD-10-CM

## 2019-01-01 DIAGNOSIS — M25.561 CHRONIC PAIN OF BOTH KNEES: ICD-10-CM

## 2019-01-01 DIAGNOSIS — M19.011 ARTHRITIS OF RIGHT SHOULDER REGION: ICD-10-CM

## 2019-01-01 DIAGNOSIS — E11.29 TYPE 2 DIABETES MELLITUS WITH MICROALBUMINURIA, UNSPECIFIED WHETHER LONG TERM INSULIN USE (HCC): Primary | ICD-10-CM

## 2019-01-01 DIAGNOSIS — R05.9 COUGH: ICD-10-CM

## 2019-01-01 DIAGNOSIS — G89.3 CANCER ASSOCIATED PAIN: ICD-10-CM

## 2019-01-01 DIAGNOSIS — R25.1 TREMOR: ICD-10-CM

## 2019-01-01 DIAGNOSIS — R53.83 OTHER FATIGUE: Primary | ICD-10-CM

## 2019-01-01 DIAGNOSIS — J18.9 PNEUMONIA DUE TO ORGANISM: Primary | ICD-10-CM

## 2019-01-01 DIAGNOSIS — R06.02 SOB (SHORTNESS OF BREATH): Primary | ICD-10-CM

## 2019-01-01 DIAGNOSIS — K92.1 MELENA: Primary | ICD-10-CM

## 2019-01-01 DIAGNOSIS — J18.9 PNEUMONIA DUE TO INFECTIOUS ORGANISM, UNSPECIFIED LATERALITY, UNSPECIFIED PART OF LUNG: Primary | ICD-10-CM

## 2019-01-01 DIAGNOSIS — R19.7 DIARRHEA, UNSPECIFIED TYPE: ICD-10-CM

## 2019-01-01 DIAGNOSIS — R60.9 EDEMA, UNSPECIFIED TYPE: ICD-10-CM

## 2019-01-01 DIAGNOSIS — J44.9 CHRONIC OBSTRUCTIVE PULMONARY DISEASE, UNSPECIFIED COPD TYPE (HCC): Primary | ICD-10-CM

## 2019-01-01 DIAGNOSIS — D64.9 ANEMIA, UNSPECIFIED TYPE: ICD-10-CM

## 2019-01-01 DIAGNOSIS — I10 ESSENTIAL HYPERTENSION: Primary | ICD-10-CM

## 2019-01-01 DIAGNOSIS — R44.1 HALLUCINATIONS, VISUAL: ICD-10-CM

## 2019-01-01 LAB
ALBUMIN SERPL-MCNC: 3.2 G/DL (ref 3.5–4.6)
ALBUMIN SERPL-MCNC: 3.3 G/DL (ref 3.5–4.6)
ALBUMIN SERPL-MCNC: 4.1 G/DL (ref 3.5–4.6)
ALP BLD-CCNC: 78 U/L (ref 40–130)
ALP BLD-CCNC: 90 U/L (ref 40–130)
ALT SERPL-CCNC: 12 U/L (ref 0–33)
ALT SERPL-CCNC: 33 U/L (ref 0–33)
AMMONIA: 38 UMOL/L (ref 11–51)
ANION GAP SERPL CALCULATED.3IONS-SCNC: 10 MEQ/L (ref 9–15)
ANION GAP SERPL CALCULATED.3IONS-SCNC: 10 MEQ/L (ref 9–15)
ANION GAP SERPL CALCULATED.3IONS-SCNC: 11 MEQ/L (ref 9–15)
ANION GAP SERPL CALCULATED.3IONS-SCNC: 12 MEQ/L (ref 9–15)
ANION GAP SERPL CALCULATED.3IONS-SCNC: 12 MEQ/L (ref 9–15)
ANION GAP SERPL CALCULATED.3IONS-SCNC: 14 MEQ/L (ref 9–15)
ANION GAP SERPL CALCULATED.3IONS-SCNC: 15 MEQ/L (ref 9–15)
ANION GAP SERPL CALCULATED.3IONS-SCNC: 16 MEQ/L (ref 9–15)
ANION GAP SERPL CALCULATED.3IONS-SCNC: 9 MEQ/L (ref 9–15)
ANISOCYTOSIS: ABNORMAL
APTT: 30.8 SEC (ref 24.4–36.8)
AST SERPL-CCNC: 18 U/L (ref 0–35)
AST SERPL-CCNC: 66 U/L (ref 0–35)
BACTERIA: NEGATIVE /HPF
BASOPHILS ABSOLUTE: 0.1 K/UL (ref 0–0.2)
BASOPHILS ABSOLUTE: 0.2 K/UL (ref 0–0.2)
BASOPHILS RELATIVE PERCENT: 0.8 %
BASOPHILS RELATIVE PERCENT: 1.6 %
BILIRUB SERPL-MCNC: 0.5 MG/DL (ref 0.2–0.7)
BILIRUB SERPL-MCNC: 1.6 MG/DL (ref 0.2–0.7)
BILIRUBIN URINE: NEGATIVE
BILIRUBIN, POC: NORMAL
BLOOD CULTURE, ROUTINE: NORMAL
BLOOD URINE, POC: NORMAL
BLOOD, URINE: ABNORMAL
BUN BLDV-MCNC: 11 MG/DL (ref 8–23)
BUN BLDV-MCNC: 12 MG/DL (ref 8–23)
BUN BLDV-MCNC: 15 MG/DL (ref 8–23)
BUN BLDV-MCNC: 15 MG/DL (ref 8–23)
BUN BLDV-MCNC: 16 MG/DL (ref 8–23)
BUN BLDV-MCNC: 17 MG/DL (ref 8–23)
BUN BLDV-MCNC: 6 MG/DL (ref 8–23)
BUN BLDV-MCNC: 6 MG/DL (ref 8–23)
BUN BLDV-MCNC: 7 MG/DL (ref 8–23)
BUN BLDV-MCNC: 7 MG/DL (ref 8–23)
BUN BLDV-MCNC: 9 MG/DL (ref 8–23)
C-REACTIVE PROTEIN, HIGH SENSITIVITY: 49 MG/L (ref 0–5)
CALCIUM SERPL-MCNC: 8.1 MG/DL (ref 8.5–9.9)
CALCIUM SERPL-MCNC: 8.7 MG/DL (ref 8.5–9.9)
CALCIUM SERPL-MCNC: 8.8 MG/DL (ref 8.5–9.9)
CALCIUM SERPL-MCNC: 8.9 MG/DL (ref 8.5–9.9)
CALCIUM SERPL-MCNC: 9.1 MG/DL (ref 8.5–9.9)
CALCIUM SERPL-MCNC: 9.2 MG/DL (ref 8.5–9.9)
CALCIUM SERPL-MCNC: 9.2 MG/DL (ref 8.5–9.9)
CALCIUM SERPL-MCNC: 9.6 MG/DL (ref 8.5–9.9)
CHLORIDE BLD-SCNC: 100 MEQ/L (ref 95–107)
CHLORIDE BLD-SCNC: 103 MEQ/L (ref 95–107)
CHLORIDE BLD-SCNC: 103 MEQ/L (ref 95–107)
CHLORIDE BLD-SCNC: 106 MEQ/L (ref 95–107)
CHLORIDE BLD-SCNC: 109 MEQ/L (ref 95–107)
CHLORIDE BLD-SCNC: 96 MEQ/L (ref 95–107)
CHLORIDE BLD-SCNC: 98 MEQ/L (ref 95–107)
CHLORIDE BLD-SCNC: 99 MEQ/L (ref 95–107)
CHLORIDE BLD-SCNC: 99 MEQ/L (ref 95–107)
CLARITY, POC: NORMAL
CLARITY: ABNORMAL
CO2: 22 MEQ/L (ref 20–31)
CO2: 24 MEQ/L (ref 20–31)
CO2: 24 MEQ/L (ref 20–31)
CO2: 25 MEQ/L (ref 20–31)
CO2: 26 MEQ/L (ref 20–31)
CO2: 27 MEQ/L (ref 20–31)
CO2: 30 MEQ/L (ref 20–31)
CO2: 31 MEQ/L (ref 20–31)
CO2: 32 MEQ/L (ref 20–31)
COLOR, POC: CLEAR
COLOR: ABNORMAL
CREAT SERPL-MCNC: 0.5 MG/DL (ref 0.5–0.9)
CREAT SERPL-MCNC: 0.52 MG/DL (ref 0.5–0.9)
CREAT SERPL-MCNC: 0.57 MG/DL (ref 0.5–0.9)
CREAT SERPL-MCNC: 0.57 MG/DL (ref 0.5–0.9)
CREAT SERPL-MCNC: 0.58 MG/DL (ref 0.5–0.9)
CREAT SERPL-MCNC: 0.59 MG/DL (ref 0.5–0.9)
CREAT SERPL-MCNC: 0.6 MG/DL (ref 0.5–0.9)
CREAT SERPL-MCNC: 0.61 MG/DL (ref 0.5–0.9)
CREAT SERPL-MCNC: 0.7 MG/DL (ref 0.5–0.9)
CREAT SERPL-MCNC: 0.7 MG/DL (ref 0.5–0.9)
CREAT SERPL-MCNC: 0.83 MG/DL (ref 0.5–0.9)
CREATININE URINE: 93 MG/DL
CULTURE, BLOOD 2: NORMAL
EKG ATRIAL RATE: 101 BPM
EKG ATRIAL RATE: 110 BPM
EKG ATRIAL RATE: 150 BPM
EKG ATRIAL RATE: 86 BPM
EKG Q-T INTERVAL: 306 MS
EKG Q-T INTERVAL: 308 MS
EKG Q-T INTERVAL: 356 MS
EKG Q-T INTERVAL: 416 MS
EKG QRS DURATION: 78 MS
EKG QRS DURATION: 80 MS
EKG QRS DURATION: 84 MS
EKG QRS DURATION: 86 MS
EKG QTC CALCULATION (BAZETT): 396 MS
EKG QTC CALCULATION (BAZETT): 426 MS
EKG QTC CALCULATION (BAZETT): 475 MS
EKG QTC CALCULATION (BAZETT): 488 MS
EKG R AXIS: 38 DEGREES
EKG R AXIS: 39 DEGREES
EKG R AXIS: 61 DEGREES
EKG R AXIS: 76 DEGREES
EKG T AXIS: 106 DEGREES
EKG T AXIS: 109 DEGREES
EKG T AXIS: 44 DEGREES
EKG T AXIS: 60 DEGREES
EKG VENTRICULAR RATE: 101 BPM
EKG VENTRICULAR RATE: 107 BPM
EKG VENTRICULAR RATE: 115 BPM
EKG VENTRICULAR RATE: 83 BPM
EOSINOPHILS ABSOLUTE: 0 K/UL (ref 0–0.7)
EOSINOPHILS ABSOLUTE: 0.2 K/UL (ref 0–0.7)
EOSINOPHILS RELATIVE PERCENT: 0.3 %
EOSINOPHILS RELATIVE PERCENT: 2 %
EPITHELIAL CELLS, UA: ABNORMAL /HPF (ref 0–5)
GFR AFRICAN AMERICAN: >60
GFR NON-AFRICAN AMERICAN: >60
GLOBULIN: 3.1 G/DL (ref 2.3–3.5)
GLOBULIN: 3.2 G/DL (ref 2.3–3.5)
GLUCOSE BLD-MCNC: 109 MG/DL (ref 70–99)
GLUCOSE BLD-MCNC: 114 MG/DL (ref 60–115)
GLUCOSE BLD-MCNC: 115 MG/DL (ref 60–115)
GLUCOSE BLD-MCNC: 119 MG/DL (ref 70–99)
GLUCOSE BLD-MCNC: 120 MG/DL (ref 60–115)
GLUCOSE BLD-MCNC: 127 MG/DL (ref 60–115)
GLUCOSE BLD-MCNC: 127 MG/DL (ref 70–99)
GLUCOSE BLD-MCNC: 129 MG/DL (ref 60–115)
GLUCOSE BLD-MCNC: 129 MG/DL (ref 70–99)
GLUCOSE BLD-MCNC: 130 MG/DL (ref 60–115)
GLUCOSE BLD-MCNC: 132 MG/DL (ref 60–115)
GLUCOSE BLD-MCNC: 135 MG/DL (ref 70–99)
GLUCOSE BLD-MCNC: 138 MG/DL (ref 60–115)
GLUCOSE BLD-MCNC: 138 MG/DL (ref 70–99)
GLUCOSE BLD-MCNC: 142 MG/DL (ref 60–115)
GLUCOSE BLD-MCNC: 142 MG/DL (ref 60–115)
GLUCOSE BLD-MCNC: 147 MG/DL (ref 60–115)
GLUCOSE BLD-MCNC: 150 MG/DL (ref 60–115)
GLUCOSE BLD-MCNC: 153 MG/DL (ref 60–115)
GLUCOSE BLD-MCNC: 154 MG/DL (ref 70–99)
GLUCOSE BLD-MCNC: 154 MG/DL (ref 70–99)
GLUCOSE BLD-MCNC: 156 MG/DL (ref 60–115)
GLUCOSE BLD-MCNC: 157 MG/DL (ref 60–115)
GLUCOSE BLD-MCNC: 159 MG/DL (ref 70–99)
GLUCOSE BLD-MCNC: 165 MG/DL (ref 60–115)
GLUCOSE BLD-MCNC: 165 MG/DL (ref 70–99)
GLUCOSE BLD-MCNC: 168 MG/DL (ref 60–115)
GLUCOSE BLD-MCNC: 170 MG/DL (ref 60–115)
GLUCOSE BLD-MCNC: 171 MG/DL (ref 60–115)
GLUCOSE BLD-MCNC: 171 MG/DL (ref 70–99)
GLUCOSE BLD-MCNC: 174 MG/DL (ref 60–115)
GLUCOSE BLD-MCNC: 177 MG/DL (ref 60–115)
GLUCOSE BLD-MCNC: 178 MG/DL (ref 60–115)
GLUCOSE BLD-MCNC: 179 MG/DL (ref 60–115)
GLUCOSE BLD-MCNC: 182 MG/DL (ref 60–115)
GLUCOSE BLD-MCNC: 183 MG/DL (ref 60–115)
GLUCOSE BLD-MCNC: 185 MG/DL (ref 60–115)
GLUCOSE BLD-MCNC: 201 MG/DL (ref 60–115)
GLUCOSE BLD-MCNC: 207 MG/DL (ref 60–115)
GLUCOSE BLD-MCNC: 213 MG/DL (ref 60–115)
GLUCOSE BLD-MCNC: 216 MG/DL (ref 60–115)
GLUCOSE BLD-MCNC: 232 MG/DL (ref 60–115)
GLUCOSE BLD-MCNC: 238 MG/DL (ref 60–115)
GLUCOSE BLD-MCNC: 260 MG/DL (ref 60–115)
GLUCOSE BLD-MCNC: 347 MG/DL (ref 60–115)
GLUCOSE URINE, POC: NORMAL
GLUCOSE URINE: 100 MG/DL
HBA1C MFR BLD: 6.7 % (ref 4.8–5.9)
HCT VFR BLD CALC: 33.9 % (ref 37–47)
HCT VFR BLD CALC: 34.9 % (ref 37–47)
HCT VFR BLD CALC: 35.1 % (ref 37–47)
HCT VFR BLD CALC: 36.1 % (ref 37–47)
HCT VFR BLD CALC: 39.8 % (ref 37–47)
HCT VFR BLD CALC: 41 % (ref 37–47)
HCT VFR BLD CALC: 41.2 % (ref 37–47)
HEMOGLOBIN: 10.1 G/DL (ref 12–16)
HEMOGLOBIN: 10.2 G/DL (ref 12–16)
HEMOGLOBIN: 10.4 G/DL (ref 12–16)
HEMOGLOBIN: 10.9 G/DL (ref 12–16)
HEMOGLOBIN: 12 G/DL (ref 12–16)
HEMOGLOBIN: 12.7 G/DL (ref 12–16)
HEMOGLOBIN: 12.8 G/DL (ref 12–16)
HYALINE CASTS: ABNORMAL /HPF (ref 0–5)
INR BLD: 1.2
KETONES, POC: NORMAL
KETONES, URINE: ABNORMAL MG/DL
LACTIC ACID, SEPSIS: 2 MMOL/L (ref 0.5–1.9)
LACTIC ACID, SEPSIS: 2.5 MMOL/L (ref 0.5–1.9)
LACTIC ACID: 1 MMOL/L (ref 0.5–2.2)
LACTIC ACID: 2.1 MMOL/L (ref 0.5–2.2)
LACTIC ACID: 4.1 MMOL/L (ref 0.5–2.2)
LEUKOCYTE EST, POC: NORMAL
LEUKOCYTE ESTERASE, URINE: ABNORMAL
LYMPHOCYTES ABSOLUTE: 0.7 K/UL (ref 1–4.8)
LYMPHOCYTES ABSOLUTE: 1.8 K/UL (ref 1–4.8)
LYMPHOCYTES RELATIVE PERCENT: 14.6 %
LYMPHOCYTES RELATIVE PERCENT: 8.1 %
MAGNESIUM: 2.1 MG/DL (ref 1.7–2.4)
MCH RBC QN AUTO: 20.1 PG (ref 27–31.3)
MCH RBC QN AUTO: 20.2 PG (ref 27–31.3)
MCH RBC QN AUTO: 20.4 PG (ref 27–31.3)
MCH RBC QN AUTO: 20.5 PG (ref 27–31.3)
MCH RBC QN AUTO: 20.5 PG (ref 27–31.3)
MCH RBC QN AUTO: 27 PG (ref 27–31.3)
MCH RBC QN AUTO: 27.2 PG (ref 27–31.3)
MCHC RBC AUTO-ENTMCNC: 29.1 % (ref 33–37)
MCHC RBC AUTO-ENTMCNC: 29.3 % (ref 33–37)
MCHC RBC AUTO-ENTMCNC: 29.7 % (ref 33–37)
MCHC RBC AUTO-ENTMCNC: 29.9 % (ref 33–37)
MCHC RBC AUTO-ENTMCNC: 30.1 % (ref 33–37)
MCHC RBC AUTO-ENTMCNC: 31 % (ref 33–37)
MCHC RBC AUTO-ENTMCNC: 31.9 % (ref 33–37)
MCV RBC AUTO: 67.9 FL (ref 82–100)
MCV RBC AUTO: 68.1 FL (ref 82–100)
MCV RBC AUTO: 68.4 FL (ref 82–100)
MCV RBC AUTO: 69.2 FL (ref 82–100)
MCV RBC AUTO: 69.2 FL (ref 82–100)
MCV RBC AUTO: 85.5 FL (ref 82–100)
MCV RBC AUTO: 87.1 FL (ref 82–100)
MICROALBUMIN UR-MCNC: 21.8 MG/DL
MICROALBUMIN/CREAT UR-RTO: 234.4 MG/G (ref 0–30)
MICROCYTES: ABNORMAL
MONOCYTES ABSOLUTE: 0.7 K/UL (ref 0.2–0.8)
MONOCYTES ABSOLUTE: 0.9 K/UL (ref 0.2–0.8)
MONOCYTES RELATIVE PERCENT: 6.9 %
MONOCYTES RELATIVE PERCENT: 8.1 %
NEUTROPHILS ABSOLUTE: 7.3 K/UL (ref 1.4–6.5)
NEUTROPHILS ABSOLUTE: 9.6 K/UL (ref 1.4–6.5)
NEUTROPHILS RELATIVE PERCENT: 76.6 %
NEUTROPHILS RELATIVE PERCENT: 81 %
NITRITE, POC: NORMAL
NITRITE, URINE: NEGATIVE
PDW BLD-RTO: 17.7 % (ref 11.5–14.5)
PDW BLD-RTO: 18.4 % (ref 11.5–14.5)
PDW BLD-RTO: 25.1 % (ref 11.5–14.5)
PDW BLD-RTO: 25.3 % (ref 11.5–14.5)
PDW BLD-RTO: 25.5 % (ref 11.5–14.5)
PDW BLD-RTO: 25.7 % (ref 11.5–14.5)
PDW BLD-RTO: 25.8 % (ref 11.5–14.5)
PERFORMED ON: ABNORMAL
PERFORMED ON: NORMAL
PERFORMED ON: NORMAL
PH UA: 5.5 (ref 5–9)
PH, POC: 6
PHOSPHORUS: 3.5 MG/DL (ref 2.3–4.8)
PLATELET # BLD: 156 K/UL (ref 130–400)
PLATELET # BLD: 166 K/UL (ref 130–400)
PLATELET # BLD: 207 K/UL (ref 130–400)
PLATELET # BLD: 223 K/UL (ref 130–400)
PLATELET # BLD: 227 K/UL (ref 130–400)
PLATELET # BLD: 242 K/UL (ref 130–400)
PLATELET # BLD: 279 K/UL (ref 130–400)
PLATELET SLIDE REVIEW: ADEQUATE
POIKILOCYTES: ABNORMAL
POTASSIUM REFLEX MAGNESIUM: 3.8 MEQ/L (ref 3.4–4.9)
POTASSIUM REFLEX MAGNESIUM: 3.9 MEQ/L (ref 3.4–4.9)
POTASSIUM REFLEX MAGNESIUM: 4 MEQ/L (ref 3.4–4.9)
POTASSIUM REFLEX MAGNESIUM: 4.1 MEQ/L (ref 3.4–4.9)
POTASSIUM SERPL-SCNC: 3.5 MEQ/L (ref 3.4–4.9)
POTASSIUM SERPL-SCNC: 3.7 MEQ/L (ref 3.4–4.9)
POTASSIUM SERPL-SCNC: 3.8 MEQ/L (ref 3.4–4.9)
POTASSIUM SERPL-SCNC: 3.8 MEQ/L (ref 3.4–4.9)
POTASSIUM SERPL-SCNC: 4 MEQ/L (ref 3.4–4.9)
POTASSIUM SERPL-SCNC: 4.2 MEQ/L (ref 3.4–4.9)
POTASSIUM SERPL-SCNC: 4.2 MEQ/L (ref 3.4–4.9)
PRO-BNP: 2710 PG/ML
PRO-BNP: 4104 PG/ML
PRO-BNP: 429 PG/ML
PRO-BNP: 676 PG/ML
PROCALCITONIN: 0.08 NG/ML (ref 0–0.15)
PROCALCITONIN: 0.52 NG/ML (ref 0–0.15)
PROTEIN UA: >=300 MG/DL
PROTEIN, POC: 100
PROTHROMBIN TIME: 16 SEC (ref 12.3–14.9)
RAPID INFLUENZA  B AGN: NEGATIVE
RAPID INFLUENZA A AGN: NEGATIVE
RBC # BLD: 4.66 M/UL (ref 4.2–5.4)
RBC # BLD: 4.73 M/UL (ref 4.2–5.4)
RBC # BLD: 4.97 M/UL (ref 4.2–5.4)
RBC # BLD: 5.04 M/UL (ref 4.2–5.4)
RBC # BLD: 5.08 M/UL (ref 4.2–5.4)
RBC # BLD: 5.31 M/UL (ref 4.2–5.4)
RBC # BLD: 6 M/UL (ref 4.2–5.4)
RBC UA: ABNORMAL /HPF (ref 0–5)
SODIUM BLD-SCNC: 135 MEQ/L (ref 135–144)
SODIUM BLD-SCNC: 139 MEQ/L (ref 135–144)
SODIUM BLD-SCNC: 140 MEQ/L (ref 135–144)
SODIUM BLD-SCNC: 141 MEQ/L (ref 135–144)
SODIUM BLD-SCNC: 144 MEQ/L (ref 135–144)
SODIUM BLD-SCNC: 145 MEQ/L (ref 135–144)
SPECIFIC GRAVITY UA: 1.03 (ref 1–1.03)
SPECIFIC GRAVITY, POC: 1.02
TOTAL CK: 67 U/L (ref 0–170)
TOTAL PROTEIN: 6.3 G/DL (ref 6.3–8)
TOTAL PROTEIN: 7.3 G/DL (ref 6.3–8)
TROPONIN: 0.01 NG/ML (ref 0–0.01)
TROPONIN: 0.01 NG/ML (ref 0–0.01)
TROPONIN: <0.01 NG/ML (ref 0–0.01)
URINE CULTURE, ROUTINE: NORMAL
URINE CULTURE, ROUTINE: NORMAL
URINE REFLEX TO CULTURE: YES
UROBILINOGEN, POC: NORMAL
UROBILINOGEN, URINE: 1 E.U./DL
WBC # BLD: 10.1 K/UL (ref 4.8–10.8)
WBC # BLD: 12.5 K/UL (ref 4.8–10.8)
WBC # BLD: 6 K/UL (ref 4.8–10.8)
WBC # BLD: 7 K/UL (ref 4.8–10.8)
WBC # BLD: 8.8 K/UL (ref 4.8–10.8)
WBC # BLD: 9.1 K/UL (ref 4.8–10.8)
WBC # BLD: 9.6 K/UL (ref 4.8–10.8)
WBC UA: ABNORMAL /HPF (ref 0–5)

## 2019-01-01 PROCEDURE — 80048 BASIC METABOLIC PNL TOTAL CA: CPT

## 2019-01-01 PROCEDURE — 2700000000 HC OXYGEN THERAPY PER DAY

## 2019-01-01 PROCEDURE — 93010 ELECTROCARDIOGRAM REPORT: CPT | Performed by: INTERNAL MEDICINE

## 2019-01-01 PROCEDURE — 99350 HOME/RES VST EST HIGH MDM 60: CPT | Performed by: NURSE PRACTITIONER

## 2019-01-01 PROCEDURE — 83880 ASSAY OF NATRIURETIC PEPTIDE: CPT

## 2019-01-01 PROCEDURE — 6360000002 HC RX W HCPCS: Performed by: INTERNAL MEDICINE

## 2019-01-01 PROCEDURE — 6370000000 HC RX 637 (ALT 250 FOR IP): Performed by: INTERNAL MEDICINE

## 2019-01-01 PROCEDURE — 85027 COMPLETE CBC AUTOMATED: CPT

## 2019-01-01 PROCEDURE — 6370000000 HC RX 637 (ALT 250 FOR IP): Performed by: HOSPITALIST

## 2019-01-01 PROCEDURE — 84484 ASSAY OF TROPONIN QUANT: CPT

## 2019-01-01 PROCEDURE — 2580000003 HC RX 258: Performed by: INTERNAL MEDICINE

## 2019-01-01 PROCEDURE — 6370000000 HC RX 637 (ALT 250 FOR IP): Performed by: PSYCHIATRY & NEUROLOGY

## 2019-01-01 PROCEDURE — 1210000000 HC MED SURG R&B

## 2019-01-01 PROCEDURE — 6360000002 HC RX W HCPCS: Performed by: NURSE PRACTITIONER

## 2019-01-01 PROCEDURE — 99232 SBSQ HOSP IP/OBS MODERATE 35: CPT | Performed by: INTERNAL MEDICINE

## 2019-01-01 PROCEDURE — 92611 MOTION FLUOROSCOPY/SWALLOW: CPT

## 2019-01-01 PROCEDURE — 97166 OT EVAL MOD COMPLEX 45 MIN: CPT

## 2019-01-01 PROCEDURE — 96365 THER/PROPH/DIAG IV INF INIT: CPT

## 2019-01-01 PROCEDURE — 87086 URINE CULTURE/COLONY COUNT: CPT

## 2019-01-01 PROCEDURE — 99204 OFFICE O/P NEW MOD 45 MIN: CPT | Performed by: COLON & RECTAL SURGERY

## 2019-01-01 PROCEDURE — 84145 PROCALCITONIN (PCT): CPT

## 2019-01-01 PROCEDURE — 6370000000 HC RX 637 (ALT 250 FOR IP): Performed by: PHYSICIAN ASSISTANT

## 2019-01-01 PROCEDURE — 83605 ASSAY OF LACTIC ACID: CPT

## 2019-01-01 PROCEDURE — 6370000000 HC RX 637 (ALT 250 FOR IP): Performed by: NURSE PRACTITIONER

## 2019-01-01 PROCEDURE — 2060000000 HC ICU INTERMEDIATE R&B

## 2019-01-01 PROCEDURE — 94664 DEMO&/EVAL PT USE INHALER: CPT

## 2019-01-01 PROCEDURE — 94640 AIRWAY INHALATION TREATMENT: CPT

## 2019-01-01 PROCEDURE — 85025 COMPLETE CBC W/AUTO DIFF WBC: CPT

## 2019-01-01 PROCEDURE — 80069 RENAL FUNCTION PANEL: CPT

## 2019-01-01 PROCEDURE — 99349 HOME/RES VST EST MOD MDM 40: CPT | Performed by: NURSE PRACTITIONER

## 2019-01-01 PROCEDURE — 85610 PROTHROMBIN TIME: CPT

## 2019-01-01 PROCEDURE — 99222 1ST HOSP IP/OBS MODERATE 55: CPT | Performed by: PSYCHIATRY & NEUROLOGY

## 2019-01-01 PROCEDURE — 87040 BLOOD CULTURE FOR BACTERIA: CPT

## 2019-01-01 PROCEDURE — 2580000003 HC RX 258: Performed by: NURSE PRACTITIONER

## 2019-01-01 PROCEDURE — 36415 COLL VENOUS BLD VENIPUNCTURE: CPT

## 2019-01-01 PROCEDURE — 36591 DRAW BLOOD OFF VENOUS DEVICE: CPT

## 2019-01-01 PROCEDURE — 2500000003 HC RX 250 WO HCPCS: Performed by: INTERNAL MEDICINE

## 2019-01-01 PROCEDURE — 99214 OFFICE O/P EST MOD 30 MIN: CPT | Performed by: FAMILY MEDICINE

## 2019-01-01 PROCEDURE — 96375 TX/PRO/DX INJ NEW DRUG ADDON: CPT

## 2019-01-01 PROCEDURE — 97162 PT EVAL MOD COMPLEX 30 MIN: CPT

## 2019-01-01 PROCEDURE — 82550 ASSAY OF CK (CPK): CPT

## 2019-01-01 PROCEDURE — 71046 X-RAY EXAM CHEST 2 VIEWS: CPT

## 2019-01-01 PROCEDURE — 92610 EVALUATE SWALLOWING FUNCTION: CPT

## 2019-01-01 PROCEDURE — 71045 X-RAY EXAM CHEST 1 VIEW: CPT

## 2019-01-01 PROCEDURE — G8979 MOBILITY GOAL STATUS: HCPCS

## 2019-01-01 PROCEDURE — 83735 ASSAY OF MAGNESIUM: CPT

## 2019-01-01 PROCEDURE — 80053 COMPREHEN METABOLIC PANEL: CPT

## 2019-01-01 PROCEDURE — 81001 URINALYSIS AUTO W/SCOPE: CPT

## 2019-01-01 PROCEDURE — G8978 MOBILITY CURRENT STATUS: HCPCS

## 2019-01-01 PROCEDURE — 2500000003 HC RX 250 WO HCPCS: Performed by: HOSPITALIST

## 2019-01-01 PROCEDURE — 97535 SELF CARE MNGMENT TRAINING: CPT

## 2019-01-01 PROCEDURE — 1111F DSCHRG MED/CURRENT MED MERGE: CPT | Performed by: FAMILY MEDICINE

## 2019-01-01 PROCEDURE — 99285 EMERGENCY DEPT VISIT HI MDM: CPT

## 2019-01-01 PROCEDURE — 93005 ELECTROCARDIOGRAM TRACING: CPT

## 2019-01-01 PROCEDURE — APPNB45 APP NON BILLABLE 31-45 MINUTES: Performed by: PHYSICIAN ASSISTANT

## 2019-01-01 PROCEDURE — 6370000000 HC RX 637 (ALT 250 FOR IP): Performed by: EMERGENCY MEDICINE

## 2019-01-01 PROCEDURE — 6360000002 HC RX W HCPCS: Performed by: EMERGENCY MEDICINE

## 2019-01-01 PROCEDURE — 97116 GAIT TRAINING THERAPY: CPT

## 2019-01-01 PROCEDURE — 99223 1ST HOSP IP/OBS HIGH 75: CPT | Performed by: INTERNAL MEDICINE

## 2019-01-01 PROCEDURE — 97110 THERAPEUTIC EXERCISES: CPT

## 2019-01-01 PROCEDURE — 86141 C-REACTIVE PROTEIN HS: CPT

## 2019-01-01 PROCEDURE — 85730 THROMBOPLASTIN TIME PARTIAL: CPT

## 2019-01-01 PROCEDURE — 81002 URINALYSIS NONAUTO W/O SCOPE: CPT | Performed by: FAMILY MEDICINE

## 2019-01-01 PROCEDURE — 92526 ORAL FUNCTION THERAPY: CPT

## 2019-01-01 PROCEDURE — 99348 HOME/RES VST EST LOW MDM 30: CPT | Performed by: NURSE PRACTITIONER

## 2019-01-01 PROCEDURE — 93306 TTE W/DOPPLER COMPLETE: CPT

## 2019-01-01 PROCEDURE — 96374 THER/PROPH/DIAG INJ IV PUSH: CPT

## 2019-01-01 PROCEDURE — 6360000002 HC RX W HCPCS: Performed by: PHYSICIAN ASSISTANT

## 2019-01-01 PROCEDURE — 36592 COLLECT BLOOD FROM PICC: CPT

## 2019-01-01 PROCEDURE — 93005 ELECTROCARDIOGRAM TRACING: CPT | Performed by: PHYSICIAN ASSISTANT

## 2019-01-01 PROCEDURE — 94760 N-INVAS EAR/PLS OXIMETRY 1: CPT

## 2019-01-01 PROCEDURE — APPNB30 APP NON BILLABLE TIME 0-30 MINS: Performed by: PHYSICIAN ASSISTANT

## 2019-01-01 PROCEDURE — 82140 ASSAY OF AMMONIA: CPT

## 2019-01-01 PROCEDURE — 87804 INFLUENZA ASSAY W/OPTIC: CPT

## 2019-01-01 PROCEDURE — 74230 X-RAY XM SWLNG FUNCJ C+: CPT

## 2019-01-01 RX ORDER — LEVOFLOXACIN 500 MG/1
500 TABLET, FILM COATED ORAL DAILY
Qty: 10 TABLET | Refills: 0 | Status: SHIPPED | OUTPATIENT
Start: 2019-01-01 | End: 2019-01-01 | Stop reason: ALTCHOICE

## 2019-01-01 RX ORDER — DULOXETIN HYDROCHLORIDE 60 MG/1
60 CAPSULE, DELAYED RELEASE ORAL DAILY
Status: DISCONTINUED | OUTPATIENT
Start: 2019-01-01 | End: 2019-01-01 | Stop reason: HOSPADM

## 2019-01-01 RX ORDER — PRIMIDONE 50 MG/1
50 TABLET ORAL NIGHTLY
Status: DISCONTINUED | OUTPATIENT
Start: 2019-01-01 | End: 2019-01-01 | Stop reason: HOSPADM

## 2019-01-01 RX ORDER — SODIUM CHLORIDE 0.9 % (FLUSH) 0.9 %
10 SYRINGE (ML) INJECTION PRN
Status: DISCONTINUED | OUTPATIENT
Start: 2019-01-01 | End: 2019-01-01 | Stop reason: HOSPADM

## 2019-01-01 RX ORDER — DEXTROSE MONOHYDRATE 50 MG/ML
100 INJECTION, SOLUTION INTRAVENOUS PRN
Status: DISCONTINUED | OUTPATIENT
Start: 2019-01-01 | End: 2019-01-01 | Stop reason: HOSPADM

## 2019-01-01 RX ORDER — LEVOFLOXACIN 500 MG/1
500 TABLET, FILM COATED ORAL DAILY
Qty: 10 TABLET | Refills: 0 | Status: SHIPPED | OUTPATIENT
Start: 2019-01-01 | End: 2019-01-01

## 2019-01-01 RX ORDER — ALBUTEROL SULFATE 2.5 MG/3ML
2.5 SOLUTION RESPIRATORY (INHALATION)
Status: DISCONTINUED | OUTPATIENT
Start: 2019-01-01 | End: 2019-01-01 | Stop reason: HOSPADM

## 2019-01-01 RX ORDER — IPRATROPIUM BROMIDE AND ALBUTEROL SULFATE 2.5; .5 MG/3ML; MG/3ML
1 SOLUTION RESPIRATORY (INHALATION) EVERY 4 HOURS
Qty: 540 ML | Refills: 0 | Status: SHIPPED | OUTPATIENT
Start: 2019-01-01 | End: 2019-01-01

## 2019-01-01 RX ORDER — FUROSEMIDE 10 MG/ML
40 INJECTION INTRAMUSCULAR; INTRAVENOUS ONCE
Status: COMPLETED | OUTPATIENT
Start: 2019-01-01 | End: 2019-01-01

## 2019-01-01 RX ORDER — IPRATROPIUM BROMIDE AND ALBUTEROL SULFATE 2.5; .5 MG/3ML; MG/3ML
1 SOLUTION RESPIRATORY (INHALATION) EVERY 4 HOURS PRN
Status: DISCONTINUED | OUTPATIENT
Start: 2019-01-01 | End: 2019-01-01

## 2019-01-01 RX ORDER — SODIUM CHLORIDE 0.9 % (FLUSH) 0.9 %
10 SYRINGE (ML) INJECTION EVERY 12 HOURS SCHEDULED
Status: DISCONTINUED | OUTPATIENT
Start: 2019-01-01 | End: 2019-01-01 | Stop reason: HOSPADM

## 2019-01-01 RX ORDER — FLUTICASONE PROPIONATE 50 MCG
1 SPRAY, SUSPENSION (ML) NASAL 2 TIMES DAILY
Status: DISCONTINUED | OUTPATIENT
Start: 2019-01-01 | End: 2019-01-01 | Stop reason: HOSPADM

## 2019-01-01 RX ORDER — DULOXETIN HYDROCHLORIDE 60 MG/1
CAPSULE, DELAYED RELEASE ORAL
COMMUNITY
Start: 2019-01-01 | End: 2019-01-01

## 2019-01-01 RX ORDER — METOPROLOL TARTRATE 5 MG/5ML
2.5 INJECTION INTRAVENOUS EVERY 6 HOURS PRN
Status: DISCONTINUED | OUTPATIENT
Start: 2019-01-01 | End: 2019-01-01 | Stop reason: HOSPADM

## 2019-01-01 RX ORDER — HYDROMORPHONE HYDROCHLORIDE 2 MG/1
2 TABLET ORAL EVERY 6 HOURS PRN
Qty: 120 TABLET | Refills: 0 | Status: SHIPPED | OUTPATIENT
Start: 2019-01-01 | End: 2020-01-01 | Stop reason: SDUPTHER

## 2019-01-01 RX ORDER — HYDROMORPHONE HYDROCHLORIDE 2 MG/1
2 TABLET ORAL EVERY 8 HOURS
Status: DISCONTINUED | OUTPATIENT
Start: 2019-01-01 | End: 2019-01-01

## 2019-01-01 RX ORDER — GUAIFENESIN 600 MG/1
600 TABLET, EXTENDED RELEASE ORAL 2 TIMES DAILY
Status: DISCONTINUED | OUTPATIENT
Start: 2019-01-01 | End: 2019-01-01 | Stop reason: HOSPADM

## 2019-01-01 RX ORDER — FLASH GLUCOSE SENSOR
KIT MISCELLANEOUS
Qty: 120 EACH | Refills: 2 | Status: SHIPPED | OUTPATIENT
Start: 2019-01-01 | End: 2019-01-01

## 2019-01-01 RX ORDER — ONDANSETRON 2 MG/ML
4 INJECTION INTRAMUSCULAR; INTRAVENOUS EVERY 6 HOURS PRN
Status: DISCONTINUED | OUTPATIENT
Start: 2019-01-01 | End: 2019-01-01 | Stop reason: HOSPADM

## 2019-01-01 RX ORDER — FUROSEMIDE 20 MG/1
TABLET ORAL
Qty: 180 TABLET | Refills: 0 | Status: SHIPPED | OUTPATIENT
Start: 2019-01-01

## 2019-01-01 RX ORDER — METFORMIN HYDROCHLORIDE 750 MG/1
TABLET, EXTENDED RELEASE ORAL
Qty: 60 TABLET | Refills: 4 | Status: SHIPPED | OUTPATIENT
Start: 2019-01-01 | End: 2020-01-01

## 2019-01-01 RX ORDER — LEVOFLOXACIN 750 MG/1
750 TABLET ORAL DAILY
Qty: 3 TABLET | Refills: 0 | Status: SHIPPED | OUTPATIENT
Start: 2019-01-01 | End: 2019-01-01

## 2019-01-01 RX ORDER — LISINOPRIL 5 MG/1
5 TABLET ORAL DAILY
Status: DISCONTINUED | OUTPATIENT
Start: 2019-01-01 | End: 2019-01-01 | Stop reason: HOSPADM

## 2019-01-01 RX ORDER — DIPHENOXYLATE HYDROCHLORIDE AND ATROPINE SULFATE 2.5; .025 MG/1; MG/1
1 TABLET ORAL 4 TIMES DAILY PRN
Qty: 40 TABLET | Refills: 0 | Status: SHIPPED | OUTPATIENT
Start: 2019-01-01 | End: 2019-01-01

## 2019-01-01 RX ORDER — PREDNISONE 10 MG/1
TABLET ORAL
Qty: 30 TABLET | Refills: 0 | Status: SHIPPED | OUTPATIENT
Start: 2019-01-01 | End: 2019-01-01

## 2019-01-01 RX ORDER — PREDNISONE 10 MG/1
TABLET ORAL
Qty: 30 TABLET | Refills: 0 | Status: SHIPPED | OUTPATIENT
Start: 2019-01-01 | End: 2019-01-01 | Stop reason: ALTCHOICE

## 2019-01-01 RX ORDER — MAGNESIUM HYDROXIDE/ALUMINUM HYDROXICE/SIMETHICONE 120; 1200; 1200 MG/30ML; MG/30ML; MG/30ML
30 SUSPENSION ORAL EVERY 6 HOURS PRN
Status: DISCONTINUED | OUTPATIENT
Start: 2019-01-01 | End: 2019-01-01 | Stop reason: HOSPADM

## 2019-01-01 RX ORDER — METHYLPREDNISOLONE SODIUM SUCCINATE 125 MG/2ML
125 INJECTION, POWDER, LYOPHILIZED, FOR SOLUTION INTRAMUSCULAR; INTRAVENOUS ONCE
Status: COMPLETED | OUTPATIENT
Start: 2019-01-01 | End: 2019-01-01

## 2019-01-01 RX ORDER — OXYCODONE HYDROCHLORIDE 5 MG/1
5 CAPSULE ORAL
COMMUNITY
Start: 2019-01-01 | End: 2019-01-01

## 2019-01-01 RX ORDER — 0.9 % SODIUM CHLORIDE 0.9 %
1000 INTRAVENOUS SOLUTION INTRAVENOUS ONCE
Status: COMPLETED | OUTPATIENT
Start: 2019-01-01 | End: 2019-01-01

## 2019-01-01 RX ORDER — SODIUM CHLORIDE 9 MG/ML
INJECTION, SOLUTION INTRAVENOUS CONTINUOUS
Status: DISCONTINUED | OUTPATIENT
Start: 2019-01-01 | End: 2019-01-01

## 2019-01-01 RX ORDER — FUROSEMIDE 20 MG/1
20 TABLET ORAL DAILY
Status: DISCONTINUED | OUTPATIENT
Start: 2019-01-01 | End: 2019-01-01 | Stop reason: HOSPADM

## 2019-01-01 RX ORDER — DIAPER,BRIEF,INFANT-TODD,DISP
EACH MISCELLANEOUS ONCE
Status: COMPLETED | OUTPATIENT
Start: 2019-01-01 | End: 2019-01-01

## 2019-01-01 RX ORDER — ALBUTEROL SULFATE 2.5 MG/3ML
2.5 SOLUTION RESPIRATORY (INHALATION) EVERY 6 HOURS PRN
Status: DISCONTINUED | OUTPATIENT
Start: 2019-01-01 | End: 2019-01-01

## 2019-01-01 RX ORDER — CLONAZEPAM 0.5 MG/1
0.5 TABLET ORAL 2 TIMES DAILY PRN
Status: DISCONTINUED | OUTPATIENT
Start: 2019-01-01 | End: 2019-01-01 | Stop reason: HOSPADM

## 2019-01-01 RX ORDER — IPRATROPIUM BROMIDE AND ALBUTEROL SULFATE 2.5; .5 MG/3ML; MG/3ML
1 SOLUTION RESPIRATORY (INHALATION) 3 TIMES DAILY
Status: DISCONTINUED | OUTPATIENT
Start: 2019-01-01 | End: 2019-01-01 | Stop reason: HOSPADM

## 2019-01-01 RX ORDER — BLOOD-GLUCOSE METER
1 KIT MISCELLANEOUS
Qty: 1 KIT | Refills: 0 | Status: ON HOLD | OUTPATIENT
Start: 2019-01-01 | End: 2019-01-01 | Stop reason: HOSPADM

## 2019-01-01 RX ORDER — METFORMIN HYDROCHLORIDE 750 MG/1
750 TABLET, EXTENDED RELEASE ORAL
Status: DISCONTINUED | OUTPATIENT
Start: 2019-01-01 | End: 2019-01-01 | Stop reason: HOSPADM

## 2019-01-01 RX ORDER — FLUCONAZOLE 100 MG/1
100 TABLET ORAL DAILY
Qty: 14 TABLET | Refills: 0 | Status: SHIPPED | OUTPATIENT
Start: 2019-01-01 | End: 2019-01-01

## 2019-01-01 RX ORDER — FLASH GLUCOSE SENSOR
KIT MISCELLANEOUS
Refills: 2 | COMMUNITY
Start: 2019-01-01 | End: 2019-01-01 | Stop reason: SDUPTHER

## 2019-01-01 RX ORDER — HYDROMORPHONE HYDROCHLORIDE 2 MG/1
2 TABLET ORAL EVERY 8 HOURS PRN
Status: DISCONTINUED | OUTPATIENT
Start: 2019-01-01 | End: 2019-01-01 | Stop reason: HOSPADM

## 2019-01-01 RX ORDER — DILTIAZEM HYDROCHLORIDE 180 MG/1
360 CAPSULE, COATED, EXTENDED RELEASE ORAL DAILY
Status: DISCONTINUED | OUTPATIENT
Start: 2019-01-01 | End: 2019-01-01 | Stop reason: HOSPADM

## 2019-01-01 RX ORDER — HYDROMORPHONE HYDROCHLORIDE 2 MG/1
2 TABLET ORAL EVERY 6 HOURS PRN
Qty: 120 TABLET | Refills: 0 | Status: SHIPPED | OUTPATIENT
Start: 2019-01-01 | End: 2019-01-01

## 2019-01-01 RX ORDER — CARVEDILOL 12.5 MG/1
12.5 TABLET ORAL 2 TIMES DAILY WITH MEALS
Qty: 60 TABLET | Refills: 3 | Status: ON HOLD | DISCHARGE
Start: 2019-01-01 | End: 2019-01-01 | Stop reason: HOSPADM

## 2019-01-01 RX ORDER — FUROSEMIDE 40 MG/1
40 TABLET ORAL DAILY
Status: DISCONTINUED | OUTPATIENT
Start: 2019-01-01 | End: 2019-01-01 | Stop reason: HOSPADM

## 2019-01-01 RX ORDER — CIPROFLOXACIN 2 MG/ML
400 INJECTION, SOLUTION INTRAVENOUS ONCE
Status: COMPLETED | OUTPATIENT
Start: 2019-01-01 | End: 2019-01-01

## 2019-01-01 RX ORDER — LEVOFLOXACIN 5 MG/ML
750 INJECTION, SOLUTION INTRAVENOUS ONCE
Status: COMPLETED | OUTPATIENT
Start: 2019-01-01 | End: 2019-01-01

## 2019-01-01 RX ORDER — RISPERIDONE 0.25 MG/1
0.25 TABLET, FILM COATED ORAL NIGHTLY
Status: DISCONTINUED | OUTPATIENT
Start: 2019-01-01 | End: 2019-01-01 | Stop reason: HOSPADM

## 2019-01-01 RX ORDER — LEVOFLOXACIN 500 MG/1
500 TABLET, FILM COATED ORAL DAILY
Qty: 7 TABLET | Refills: 0 | Status: SHIPPED | OUTPATIENT
Start: 2019-01-01 | End: 2019-01-01

## 2019-01-01 RX ORDER — FENTANYL 25 UG/H
1 PATCH TRANSDERMAL
Qty: 10 PATCH | Refills: 0 | Status: SHIPPED | OUTPATIENT
Start: 2019-01-01 | End: 2019-01-01

## 2019-01-01 RX ORDER — DULOXETIN HYDROCHLORIDE 60 MG/1
60 CAPSULE, DELAYED RELEASE ORAL DAILY
Qty: 30 CAPSULE | Refills: 2 | Status: SHIPPED | OUTPATIENT
Start: 2019-01-01 | End: 2020-01-01

## 2019-01-01 RX ORDER — DEXTROSE MONOHYDRATE 25 G/50ML
12.5 INJECTION, SOLUTION INTRAVENOUS PRN
Status: DISCONTINUED | OUTPATIENT
Start: 2019-01-01 | End: 2019-01-01 | Stop reason: HOSPADM

## 2019-01-01 RX ORDER — BUPRENORPHINE 20 UG/H
1 PATCH TRANSDERMAL
COMMUNITY
Start: 2019-01-01 | End: 2019-01-01

## 2019-01-01 RX ORDER — NICOTINE POLACRILEX 4 MG
15 LOZENGE BUCCAL PRN
Status: DISCONTINUED | OUTPATIENT
Start: 2019-01-01 | End: 2019-01-01 | Stop reason: HOSPADM

## 2019-01-01 RX ORDER — RISPERIDONE 0.25 MG/1
0.25 TABLET, FILM COATED ORAL NIGHTLY
Qty: 60 TABLET | Refills: 0 | DISCHARGE
Start: 2019-01-01 | End: 2019-01-01

## 2019-01-01 RX ORDER — FENTANYL 25 UG/H
1 PATCH TRANSDERMAL
Qty: 10 PATCH | Refills: 0 | Status: SHIPPED | OUTPATIENT
Start: 2019-01-01 | End: 2019-01-01 | Stop reason: ALTCHOICE

## 2019-01-01 RX ORDER — CARVEDILOL 6.25 MG/1
6.25 TABLET ORAL 2 TIMES DAILY WITH MEALS
Status: DISCONTINUED | OUTPATIENT
Start: 2019-01-01 | End: 2019-01-01

## 2019-01-01 RX ORDER — 0.9 % SODIUM CHLORIDE 0.9 %
500 INTRAVENOUS SOLUTION INTRAVENOUS ONCE
Status: COMPLETED | OUTPATIENT
Start: 2019-01-01 | End: 2019-01-01

## 2019-01-01 RX ORDER — IPRATROPIUM BROMIDE AND ALBUTEROL SULFATE 2.5; .5 MG/3ML; MG/3ML
1 SOLUTION RESPIRATORY (INHALATION) EVERY 10 MIN PRN
Status: DISCONTINUED | OUTPATIENT
Start: 2019-01-01 | End: 2019-01-01

## 2019-01-01 RX ORDER — POTASSIUM CHLORIDE 20 MEQ/1
20 TABLET, EXTENDED RELEASE ORAL ONCE
Status: COMPLETED | OUTPATIENT
Start: 2019-01-01 | End: 2019-01-01

## 2019-01-01 RX ORDER — FENTANYL 50 UG/H
1 PATCH TRANSDERMAL
COMMUNITY
End: 2019-01-01 | Stop reason: SDUPTHER

## 2019-01-01 RX ORDER — FAMOTIDINE 20 MG/1
20 TABLET, FILM COATED ORAL 2 TIMES DAILY
Status: DISCONTINUED | OUTPATIENT
Start: 2019-01-01 | End: 2019-01-01 | Stop reason: HOSPADM

## 2019-01-01 RX ORDER — CARVEDILOL 12.5 MG/1
12.5 TABLET ORAL 2 TIMES DAILY WITH MEALS
Status: DISCONTINUED | OUTPATIENT
Start: 2019-01-01 | End: 2019-01-01 | Stop reason: HOSPADM

## 2019-01-01 RX ORDER — HYDROMORPHONE HYDROCHLORIDE 4 MG/1
TABLET ORAL
COMMUNITY
Start: 2019-01-01 | End: 2019-01-01

## 2019-01-01 RX ORDER — HYDROMORPHONE HYDROCHLORIDE 2 MG/1
2 TABLET ORAL EVERY 8 HOURS PRN
Qty: 90 TABLET | Refills: 0 | Status: SHIPPED | OUTPATIENT
Start: 2019-01-01 | End: 2019-01-01

## 2019-01-01 RX ORDER — POTASSIUM CHLORIDE 20 MEQ/1
20 TABLET, EXTENDED RELEASE ORAL DAILY
Status: DISCONTINUED | OUTPATIENT
Start: 2019-01-01 | End: 2019-01-01 | Stop reason: HOSPADM

## 2019-01-01 RX ORDER — LENALIDOMIDE 25 MG/1
25 CAPSULE ORAL DAILY
Status: DISCONTINUED | OUTPATIENT
Start: 2019-01-01 | End: 2019-01-01 | Stop reason: HOSPADM

## 2019-01-01 RX ORDER — GUAIFENESIN 600 MG/1
1200 TABLET, EXTENDED RELEASE ORAL 2 TIMES DAILY
Qty: 120 TABLET | Refills: 3 | Status: SHIPPED | OUTPATIENT
Start: 2019-01-01 | End: 2020-01-01

## 2019-01-01 RX ORDER — FENTANYL 50 UG/H
1 PATCH TRANSDERMAL
Qty: 10 PATCH | Refills: 0 | Status: SHIPPED | OUTPATIENT
Start: 2019-01-01 | End: 2020-01-01 | Stop reason: SDUPTHER

## 2019-01-01 RX ORDER — PREDNISONE 10 MG/1
10 TABLET ORAL DAILY
Qty: 30 TABLET | Refills: 0 | Status: SHIPPED | OUTPATIENT
Start: 2019-01-01 | End: 2019-01-01

## 2019-01-01 RX ORDER — NYSTATIN 100000 [USP'U]/G
POWDER TOPICAL
Qty: 60 G | Refills: 5 | Status: SHIPPED | OUTPATIENT
Start: 2019-01-01

## 2019-01-01 RX ORDER — CARVEDILOL 12.5 MG/1
12.5 TABLET ORAL 2 TIMES DAILY WITH MEALS
Status: DISCONTINUED | OUTPATIENT
Start: 2019-01-01 | End: 2019-01-01

## 2019-01-01 RX ORDER — PREDNISONE 10 MG/1
40 TABLET ORAL DAILY
Status: DISCONTINUED | OUTPATIENT
Start: 2019-01-01 | End: 2019-01-01 | Stop reason: HOSPADM

## 2019-01-01 RX ORDER — LEVOFLOXACIN 5 MG/ML
750 INJECTION, SOLUTION INTRAVENOUS EVERY 24 HOURS
Status: DISCONTINUED | OUTPATIENT
Start: 2019-01-01 | End: 2019-01-01 | Stop reason: HOSPADM

## 2019-01-01 RX ORDER — CEFUROXIME AXETIL 500 MG/1
500 TABLET ORAL 2 TIMES DAILY
Refills: 0 | DISCHARGE
Start: 2019-01-01 | End: 2019-01-01

## 2019-01-01 RX ORDER — BUDESONIDE AND FORMOTEROL FUMARATE DIHYDRATE 160; 4.5 UG/1; UG/1
AEROSOL RESPIRATORY (INHALATION)
COMMUNITY
Start: 2019-01-01 | End: 2020-01-01

## 2019-01-01 RX ORDER — FUROSEMIDE 10 MG/ML
20 INJECTION INTRAMUSCULAR; INTRAVENOUS 2 TIMES DAILY
Status: COMPLETED | OUTPATIENT
Start: 2019-01-01 | End: 2019-01-01

## 2019-01-01 RX ORDER — ACETAMINOPHEN 325 MG/1
650 TABLET ORAL EVERY 4 HOURS PRN
Status: DISCONTINUED | OUTPATIENT
Start: 2019-01-01 | End: 2019-01-01 | Stop reason: HOSPADM

## 2019-01-01 RX ORDER — BUPRENORPHINE 20 UG/H
1 PATCH TRANSDERMAL
Qty: 1 PATCH | Refills: 0 | Status: SHIPPED | OUTPATIENT
Start: 2019-01-01 | End: 2019-01-01

## 2019-01-01 RX ORDER — POTASSIUM CHLORIDE 20 MEQ/1
TABLET, EXTENDED RELEASE ORAL
Qty: 90 TABLET | Refills: 3 | Status: SHIPPED | OUTPATIENT
Start: 2019-01-01

## 2019-01-01 RX ORDER — PRIMIDONE 50 MG/1
TABLET ORAL
Qty: 30 TABLET | Refills: 1 | Status: SHIPPED | OUTPATIENT
Start: 2019-01-01 | End: 2019-01-01 | Stop reason: SDUPTHER

## 2019-01-01 RX ORDER — LANOLIN ALCOHOL/MO/W.PET/CERES
3 CREAM (GRAM) TOPICAL NIGHTLY PRN
Status: DISCONTINUED | OUTPATIENT
Start: 2019-01-01 | End: 2019-01-01 | Stop reason: HOSPADM

## 2019-01-01 RX ORDER — FENTANYL 25 UG/H
1 PATCH TRANSDERMAL
Qty: 5 PATCH | Refills: 0 | Status: ON HOLD | OUTPATIENT
Start: 2019-01-01 | End: 2019-01-01 | Stop reason: HOSPADM

## 2019-01-01 RX ORDER — PANTOPRAZOLE SODIUM 40 MG/1
40 TABLET, DELAYED RELEASE ORAL
Status: DISCONTINUED | OUTPATIENT
Start: 2019-01-01 | End: 2019-01-01 | Stop reason: HOSPADM

## 2019-01-01 RX ORDER — ESOMEPRAZOLE MAGNESIUM 40 MG/1
CAPSULE, DELAYED RELEASE ORAL
Qty: 90 CAPSULE | Refills: 3 | Status: SHIPPED | OUTPATIENT
Start: 2019-01-01

## 2019-01-01 RX ORDER — OXYCODONE HYDROCHLORIDE 5 MG/1
5 TABLET ORAL 3 TIMES DAILY
Qty: 1 TABLET | Refills: 0 | Status: SHIPPED | OUTPATIENT
Start: 2019-01-01 | End: 2019-01-01

## 2019-01-01 RX ORDER — METOPROLOL TARTRATE 50 MG/1
50 TABLET, FILM COATED ORAL 2 TIMES DAILY
Status: DISCONTINUED | OUTPATIENT
Start: 2019-01-01 | End: 2019-01-01

## 2019-01-01 RX ORDER — HEPARIN SODIUM (PORCINE) LOCK FLUSH IV SOLN 100 UNIT/ML 100 UNIT/ML
100 SOLUTION INTRAVENOUS PRN
Status: DISCONTINUED | OUTPATIENT
Start: 2019-01-01 | End: 2019-01-01 | Stop reason: HOSPADM

## 2019-01-01 RX ORDER — LENALIDOMIDE 25 MG/1
25 CAPSULE ORAL
COMMUNITY
Start: 2019-01-01

## 2019-01-01 RX ORDER — PRIMIDONE 50 MG/1
TABLET ORAL
Qty: 90 TABLET | Refills: 3 | Status: SHIPPED | OUTPATIENT
Start: 2019-01-01 | End: 2019-01-01 | Stop reason: SDUPTHER

## 2019-01-01 RX ORDER — IPRATROPIUM BROMIDE AND ALBUTEROL SULFATE 2.5; .5 MG/3ML; MG/3ML
1 SOLUTION RESPIRATORY (INHALATION)
Status: DISCONTINUED | OUTPATIENT
Start: 2019-01-01 | End: 2019-01-01

## 2019-01-01 RX ORDER — DULOXETIN HYDROCHLORIDE 60 MG/1
60 CAPSULE, DELAYED RELEASE ORAL DAILY
Qty: 30 CAPSULE | Refills: 3 | Status: SHIPPED | OUTPATIENT
Start: 2019-01-01 | End: 2019-01-01 | Stop reason: SDUPTHER

## 2019-01-01 RX ORDER — GABAPENTIN 300 MG/1
300 CAPSULE ORAL 3 TIMES DAILY
Status: DISCONTINUED | OUTPATIENT
Start: 2019-01-01 | End: 2019-01-01 | Stop reason: HOSPADM

## 2019-01-01 RX ORDER — FUROSEMIDE 10 MG/ML
20 INJECTION INTRAMUSCULAR; INTRAVENOUS ONCE
Status: COMPLETED | OUTPATIENT
Start: 2019-01-01 | End: 2019-01-01

## 2019-01-01 RX ORDER — HYDROMORPHONE HYDROCHLORIDE 2 MG/1
2 TABLET ORAL EVERY 8 HOURS
Qty: 90 TABLET | Refills: 0 | Status: ON HOLD | OUTPATIENT
Start: 2019-01-01 | End: 2019-01-01 | Stop reason: HOSPADM

## 2019-01-01 RX ORDER — FLASH GLUCOSE SENSOR
1 KIT MISCELLANEOUS 4 TIMES DAILY
Qty: 120 EACH | Refills: 2 | Status: SHIPPED | OUTPATIENT
Start: 2019-01-01 | End: 2019-01-01

## 2019-01-01 RX ORDER — FENTANYL 25 UG/H
1 PATCH TRANSDERMAL
Status: DISCONTINUED | OUTPATIENT
Start: 2019-01-01 | End: 2019-01-01 | Stop reason: HOSPADM

## 2019-01-01 RX ORDER — FLUTICASONE PROPIONATE 50 MCG
1 SPRAY, SUSPENSION (ML) NASAL DAILY
Qty: 2 BOTTLE | Refills: 1 | Status: SHIPPED | OUTPATIENT
Start: 2019-01-01

## 2019-01-01 RX ORDER — IPRATROPIUM BROMIDE AND ALBUTEROL SULFATE 2.5; .5 MG/3ML; MG/3ML
1 SOLUTION RESPIRATORY (INHALATION) 3 TIMES DAILY
Status: DISCONTINUED | OUTPATIENT
Start: 2019-01-01 | End: 2019-01-01

## 2019-01-01 RX ORDER — OXYCODONE HYDROCHLORIDE 5 MG/1
5 TABLET ORAL 3 TIMES DAILY
Status: ON HOLD | COMMUNITY
End: 2019-01-01 | Stop reason: HOSPADM

## 2019-01-01 RX ORDER — PRIMIDONE 50 MG/1
TABLET ORAL
Qty: 90 TABLET | Refills: 1 | Status: SHIPPED | OUTPATIENT
Start: 2019-01-01

## 2019-01-01 RX ORDER — OXYCODONE HYDROCHLORIDE 5 MG/1
5 TABLET ORAL 3 TIMES DAILY
Status: DISCONTINUED | OUTPATIENT
Start: 2019-01-01 | End: 2019-01-01 | Stop reason: HOSPADM

## 2019-01-01 RX ORDER — 0.9 % SODIUM CHLORIDE 0.9 %
500 INTRAVENOUS SOLUTION INTRAVENOUS ONCE
Status: DISCONTINUED | OUTPATIENT
Start: 2019-01-01 | End: 2019-01-01 | Stop reason: HOSPADM

## 2019-01-01 RX ORDER — BUDESONIDE 0.5 MG/2ML
0.5 INHALANT ORAL 2 TIMES DAILY
Status: DISCONTINUED | OUTPATIENT
Start: 2019-01-01 | End: 2019-01-01 | Stop reason: HOSPADM

## 2019-01-01 RX ORDER — GABAPENTIN 300 MG/1
300 CAPSULE ORAL 3 TIMES DAILY
Qty: 270 CAPSULE | Refills: 1 | Status: SHIPPED | OUTPATIENT
Start: 2019-01-01 | End: 2019-01-01

## 2019-01-01 RX ORDER — PRIMIDONE 50 MG/1
TABLET ORAL
Qty: 90 TABLET | Refills: 3 | OUTPATIENT
Start: 2019-01-01

## 2019-01-01 RX ORDER — BUPRENORPHINE 20 UG/H
1 PATCH TRANSDERMAL
Qty: 4 PATCH | Refills: 0 | Status: SHIPPED | OUTPATIENT
Start: 2019-01-01 | End: 2019-01-01 | Stop reason: ALTCHOICE

## 2019-01-01 RX ORDER — PROCHLORPERAZINE MALEATE 10 MG
TABLET ORAL
Qty: 120 TABLET | Refills: 0 | Status: ON HOLD | OUTPATIENT
Start: 2019-01-01 | End: 2019-01-01 | Stop reason: HOSPADM

## 2019-01-01 RX ORDER — FUROSEMIDE 20 MG/1
TABLET ORAL
Qty: 90 TABLET | Refills: 0 | Status: SHIPPED | OUTPATIENT
Start: 2019-01-01 | End: 2019-01-01 | Stop reason: SDUPTHER

## 2019-01-01 RX ADMIN — CARVEDILOL 6.25 MG: 6.25 TABLET, FILM COATED ORAL at 16:06

## 2019-01-01 RX ADMIN — MELATONIN 3 MG: at 23:11

## 2019-01-01 RX ADMIN — VANCOMYCIN HYDROCHLORIDE 1000 MG: 1 INJECTION, POWDER, LYOPHILIZED, FOR SOLUTION INTRAVENOUS at 19:59

## 2019-01-01 RX ADMIN — IPRATROPIUM BROMIDE AND ALBUTEROL SULFATE 1 AMPULE: .5; 3 SOLUTION RESPIRATORY (INHALATION) at 11:15

## 2019-01-01 RX ADMIN — POTASSIUM CHLORIDE 20 MEQ: 20 TABLET, EXTENDED RELEASE ORAL at 09:44

## 2019-01-01 RX ADMIN — GABAPENTIN 300 MG: 300 CAPSULE ORAL at 20:15

## 2019-01-01 RX ADMIN — FUROSEMIDE 40 MG: 40 TABLET ORAL at 09:44

## 2019-01-01 RX ADMIN — FAMOTIDINE 20 MG: 20 TABLET ORAL at 09:02

## 2019-01-01 RX ADMIN — FLUTICASONE PROPIONATE 1 SPRAY: 50 SPRAY, METERED NASAL at 09:43

## 2019-01-01 RX ADMIN — MELATONIN 3 MG: at 22:19

## 2019-01-01 RX ADMIN — METOPROLOL TARTRATE 50 MG: 50 TABLET ORAL at 21:56

## 2019-01-01 RX ADMIN — BACITRACIN ZINC 1 G: 500 OINTMENT TOPICAL at 16:36

## 2019-01-01 RX ADMIN — BUDESONIDE 500 MCG: 0.5 SUSPENSION RESPIRATORY (INHALATION) at 19:22

## 2019-01-01 RX ADMIN — NYSTATIN 500000 UNITS: 500000 SUSPENSION ORAL at 08:56

## 2019-01-01 RX ADMIN — METOPROLOL TARTRATE 25 MG: 25 TABLET ORAL at 20:55

## 2019-01-01 RX ADMIN — OXYCODONE HYDROCHLORIDE 5 MG: 5 TABLET ORAL at 21:21

## 2019-01-01 RX ADMIN — Medication 10 ML: at 12:54

## 2019-01-01 RX ADMIN — IPRATROPIUM BROMIDE AND ALBUTEROL SULFATE 1 AMPULE: .5; 3 SOLUTION RESPIRATORY (INHALATION) at 07:22

## 2019-01-01 RX ADMIN — GUAIFENESIN 600 MG: 600 TABLET, EXTENDED RELEASE ORAL at 08:57

## 2019-01-01 RX ADMIN — OXYCODONE HYDROCHLORIDE 5 MG: 5 TABLET ORAL at 08:57

## 2019-01-01 RX ADMIN — RIVAROXABAN 20 MG: 20 TABLET, FILM COATED ORAL at 16:43

## 2019-01-01 RX ADMIN — IPRATROPIUM BROMIDE AND ALBUTEROL SULFATE 1 AMPULE: .5; 3 SOLUTION RESPIRATORY (INHALATION) at 19:47

## 2019-01-01 RX ADMIN — METOPROLOL TARTRATE 25 MG: 25 TABLET ORAL at 08:55

## 2019-01-01 RX ADMIN — FLUTICASONE PROPIONATE 1 SPRAY: 50 SPRAY, METERED NASAL at 20:24

## 2019-01-01 RX ADMIN — LEVOFLOXACIN 750 MG: 5 INJECTION, SOLUTION INTRAVENOUS at 19:34

## 2019-01-01 RX ADMIN — POTASSIUM CHLORIDE 20 MEQ: 20 TABLET, EXTENDED RELEASE ORAL at 10:19

## 2019-01-01 RX ADMIN — DILTIAZEM HYDROCHLORIDE 360 MG: 180 CAPSULE, COATED, EXTENDED RELEASE ORAL at 10:10

## 2019-01-01 RX ADMIN — MOMETASONE FUROATE AND FORMOTEROL FUMARATE DIHYDRATE 2 PUFF: 200; 5 AEROSOL RESPIRATORY (INHALATION) at 19:45

## 2019-01-01 RX ADMIN — Medication 10 ML: at 08:28

## 2019-01-01 RX ADMIN — HYDROMORPHONE HYDROCHLORIDE 2 MG: 2 TABLET ORAL at 21:09

## 2019-01-01 RX ADMIN — POTASSIUM CHLORIDE 20 MEQ: 20 TABLET, EXTENDED RELEASE ORAL at 08:55

## 2019-01-01 RX ADMIN — NYSTATIN 500000 UNITS: 500000 SUSPENSION ORAL at 09:44

## 2019-01-01 RX ADMIN — SODIUM CHLORIDE: 9 INJECTION, SOLUTION INTRAVENOUS at 21:00

## 2019-01-01 RX ADMIN — PANTOPRAZOLE SODIUM 40 MG: 40 TABLET, DELAYED RELEASE ORAL at 05:23

## 2019-01-01 RX ADMIN — MOMETASONE FUROATE AND FORMOTEROL FUMARATE DIHYDRATE 2 PUFF: 200; 5 AEROSOL RESPIRATORY (INHALATION) at 08:06

## 2019-01-01 RX ADMIN — GABAPENTIN 300 MG: 300 CAPSULE ORAL at 21:22

## 2019-01-01 RX ADMIN — FUROSEMIDE 40 MG: 40 TABLET ORAL at 09:31

## 2019-01-01 RX ADMIN — INSULIN LISPRO 1 UNITS: 100 INJECTION, SOLUTION INTRAVENOUS; SUBCUTANEOUS at 12:49

## 2019-01-01 RX ADMIN — FUROSEMIDE 20 MG: 10 INJECTION, SOLUTION INTRAVENOUS at 08:27

## 2019-01-01 RX ADMIN — GABAPENTIN 300 MG: 300 CAPSULE ORAL at 14:58

## 2019-01-01 RX ADMIN — IPRATROPIUM BROMIDE AND ALBUTEROL SULFATE 1 AMPULE: .5; 3 SOLUTION RESPIRATORY (INHALATION) at 07:29

## 2019-01-01 RX ADMIN — IPRATROPIUM BROMIDE AND ALBUTEROL SULFATE 1 AMPULE: .5; 3 SOLUTION RESPIRATORY (INHALATION) at 19:40

## 2019-01-01 RX ADMIN — METHYLPREDNISOLONE SODIUM SUCCINATE 125 MG: 125 INJECTION, POWDER, FOR SOLUTION INTRAMUSCULAR; INTRAVENOUS at 17:26

## 2019-01-01 RX ADMIN — ONDANSETRON 4 MG: 2 INJECTION INTRAMUSCULAR; INTRAVENOUS at 11:18

## 2019-01-01 RX ADMIN — FUROSEMIDE 20 MG: 20 TABLET ORAL at 16:05

## 2019-01-01 RX ADMIN — METFORMIN HYDROCHLORIDE 750 MG: 750 TABLET, EXTENDED RELEASE ORAL at 08:48

## 2019-01-01 RX ADMIN — DULOXETINE HYDROCHLORIDE 60 MG: 60 CAPSULE, DELAYED RELEASE ORAL at 09:44

## 2019-01-01 RX ADMIN — MICONAZOLE NITRATE: 20 POWDER TOPICAL at 20:42

## 2019-01-01 RX ADMIN — LISINOPRIL 5 MG: 5 TABLET ORAL at 16:06

## 2019-01-01 RX ADMIN — IPRATROPIUM BROMIDE AND ALBUTEROL SULFATE 1 AMPULE: .5; 3 SOLUTION RESPIRATORY (INHALATION) at 18:13

## 2019-01-01 RX ADMIN — Medication 10 ML: at 21:22

## 2019-01-01 RX ADMIN — METOPROLOL TARTRATE 25 MG: 25 TABLET ORAL at 20:43

## 2019-01-01 RX ADMIN — MOMETASONE FUROATE AND FORMOTEROL FUMARATE DIHYDRATE 2 PUFF: 200; 5 AEROSOL RESPIRATORY (INHALATION) at 07:53

## 2019-01-01 RX ADMIN — METOPROLOL TARTRATE 50 MG: 50 TABLET ORAL at 10:27

## 2019-01-01 RX ADMIN — NYSTATIN 500000 UNITS: 500000 SUSPENSION ORAL at 17:53

## 2019-01-01 RX ADMIN — DULOXETINE HYDROCHLORIDE 60 MG: 60 CAPSULE, DELAYED RELEASE ORAL at 10:09

## 2019-01-01 RX ADMIN — INSULIN LISPRO 1 UNITS: 100 INJECTION, SOLUTION INTRAVENOUS; SUBCUTANEOUS at 13:27

## 2019-01-01 RX ADMIN — IPRATROPIUM BROMIDE AND ALBUTEROL SULFATE 1 AMPULE: .5; 3 SOLUTION RESPIRATORY (INHALATION) at 20:45

## 2019-01-01 RX ADMIN — Medication 10 ML: at 23:02

## 2019-01-01 RX ADMIN — DILTIAZEM HYDROCHLORIDE 360 MG: 180 CAPSULE, COATED, EXTENDED RELEASE ORAL at 08:26

## 2019-01-01 RX ADMIN — MICONAZOLE NITRATE: 20 POWDER TOPICAL at 23:01

## 2019-01-01 RX ADMIN — PREDNISONE 40 MG: 10 TABLET ORAL at 10:09

## 2019-01-01 RX ADMIN — FUROSEMIDE 40 MG: 10 INJECTION, SOLUTION INTRAMUSCULAR; INTRAVENOUS at 16:25

## 2019-01-01 RX ADMIN — HYDROMORPHONE HYDROCHLORIDE 2 MG: 2 TABLET ORAL at 23:11

## 2019-01-01 RX ADMIN — CARVEDILOL 6.25 MG: 6.25 TABLET, FILM COATED ORAL at 08:58

## 2019-01-01 RX ADMIN — FLUTICASONE PROPIONATE 1 SPRAY: 50 SPRAY, METERED NASAL at 22:03

## 2019-01-01 RX ADMIN — DILTIAZEM HYDROCHLORIDE 360 MG: 180 CAPSULE, COATED, EXTENDED RELEASE ORAL at 09:49

## 2019-01-01 RX ADMIN — PREDNISONE 40 MG: 10 TABLET ORAL at 09:31

## 2019-01-01 RX ADMIN — PREDNISONE 40 MG: 10 TABLET ORAL at 08:55

## 2019-01-01 RX ADMIN — FUROSEMIDE 20 MG: 20 TABLET ORAL at 08:48

## 2019-01-01 RX ADMIN — RIVAROXABAN 20 MG: 20 TABLET, FILM COATED ORAL at 09:38

## 2019-01-01 RX ADMIN — NYSTATIN 500000 UNITS: 500000 SUSPENSION ORAL at 17:10

## 2019-01-01 RX ADMIN — Medication 10 ML: at 20:42

## 2019-01-01 RX ADMIN — DILTIAZEM HYDROCHLORIDE 360 MG: 180 CAPSULE, COATED, EXTENDED RELEASE ORAL at 08:55

## 2019-01-01 RX ADMIN — GABAPENTIN 300 MG: 300 CAPSULE ORAL at 08:57

## 2019-01-01 RX ADMIN — BUDESONIDE 500 MCG: 0.5 SUSPENSION RESPIRATORY (INHALATION) at 08:00

## 2019-01-01 RX ADMIN — MICONAZOLE NITRATE: 20 POWDER TOPICAL at 22:06

## 2019-01-01 RX ADMIN — GUAIFENESIN 600 MG: 600 TABLET, EXTENDED RELEASE ORAL at 19:59

## 2019-01-01 RX ADMIN — HYDROMORPHONE HYDROCHLORIDE 2 MG: 2 TABLET ORAL at 08:08

## 2019-01-01 RX ADMIN — FLUTICASONE PROPIONATE 1 SPRAY: 50 SPRAY, METERED NASAL at 20:31

## 2019-01-01 RX ADMIN — GUAIFENESIN 600 MG: 600 TABLET, EXTENDED RELEASE ORAL at 20:22

## 2019-01-01 RX ADMIN — NYSTATIN 500000 UNITS: 500000 SUSPENSION ORAL at 08:27

## 2019-01-01 RX ADMIN — BARIUM SULFATE 80 ML: 400 SUSPENSION ORAL at 14:16

## 2019-01-01 RX ADMIN — MICONAZOLE NITRATE: 20 POWDER TOPICAL at 08:26

## 2019-01-01 RX ADMIN — SODIUM CHLORIDE: 9 INJECTION, SOLUTION INTRAVENOUS at 08:10

## 2019-01-01 RX ADMIN — LEVOFLOXACIN 750 MG: 5 INJECTION, SOLUTION INTRAVENOUS at 20:55

## 2019-01-01 RX ADMIN — FLUTICASONE PROPIONATE 1 SPRAY: 50 SPRAY, METERED NASAL at 21:56

## 2019-01-01 RX ADMIN — BARIUM SULFATE 50 G: 0.81 POWDER, FOR SUSPENSION ORAL at 14:00

## 2019-01-01 RX ADMIN — MOMETASONE FUROATE AND FORMOTEROL FUMARATE DIHYDRATE 2 PUFF: 200; 5 AEROSOL RESPIRATORY (INHALATION) at 08:53

## 2019-01-01 RX ADMIN — LISINOPRIL 5 MG: 5 TABLET ORAL at 09:31

## 2019-01-01 RX ADMIN — GABAPENTIN 300 MG: 300 CAPSULE ORAL at 14:40

## 2019-01-01 RX ADMIN — NYSTATIN 500000 UNITS: 500000 SUSPENSION ORAL at 20:42

## 2019-01-01 RX ADMIN — BUDESONIDE 500 MCG: 0.5 SUSPENSION RESPIRATORY (INHALATION) at 07:02

## 2019-01-01 RX ADMIN — RISPERIDONE 0.25 MG: 0.25 TABLET, FILM COATED ORAL at 20:14

## 2019-01-01 RX ADMIN — PRIMIDONE 50 MG: 50 TABLET ORAL at 21:56

## 2019-01-01 RX ADMIN — GABAPENTIN 300 MG: 300 CAPSULE ORAL at 08:49

## 2019-01-01 RX ADMIN — IPRATROPIUM BROMIDE AND ALBUTEROL SULFATE 1 AMPULE: .5; 3 SOLUTION RESPIRATORY (INHALATION) at 08:06

## 2019-01-01 RX ADMIN — FLUTICASONE PROPIONATE 1 SPRAY: 50 SPRAY, METERED NASAL at 16:21

## 2019-01-01 RX ADMIN — DILTIAZEM HYDROCHLORIDE 360 MG: 180 CAPSULE, COATED, EXTENDED RELEASE ORAL at 09:32

## 2019-01-01 RX ADMIN — GUAIFENESIN 600 MG: 600 TABLET, EXTENDED RELEASE ORAL at 21:21

## 2019-01-01 RX ADMIN — DULOXETINE HYDROCHLORIDE 60 MG: 60 CAPSULE, DELAYED RELEASE ORAL at 08:47

## 2019-01-01 RX ADMIN — LISINOPRIL 5 MG: 5 TABLET ORAL at 08:55

## 2019-01-01 RX ADMIN — IPRATROPIUM BROMIDE AND ALBUTEROL SULFATE 1 AMPULE: .5; 3 SOLUTION RESPIRATORY (INHALATION) at 07:02

## 2019-01-01 RX ADMIN — MICONAZOLE NITRATE: 20 POWDER TOPICAL at 21:07

## 2019-01-01 RX ADMIN — PREDNISONE 40 MG: 10 TABLET ORAL at 14:44

## 2019-01-01 RX ADMIN — FLUTICASONE PROPIONATE 1 SPRAY: 50 SPRAY, METERED NASAL at 20:55

## 2019-01-01 RX ADMIN — OXYCODONE HYDROCHLORIDE 5 MG: 5 TABLET ORAL at 15:22

## 2019-01-01 RX ADMIN — FLUTICASONE PROPIONATE 1 SPRAY: 50 SPRAY, METERED NASAL at 20:41

## 2019-01-01 RX ADMIN — MELATONIN 3 MG: at 22:11

## 2019-01-01 RX ADMIN — BUDESONIDE 500 MCG: 0.5 SUSPENSION RESPIRATORY (INHALATION) at 20:31

## 2019-01-01 RX ADMIN — DULOXETINE HYDROCHLORIDE 60 MG: 60 CAPSULE, DELAYED RELEASE ORAL at 08:55

## 2019-01-01 RX ADMIN — FUROSEMIDE 20 MG: 20 TABLET ORAL at 09:42

## 2019-01-01 RX ADMIN — GUAIFENESIN 600 MG: 600 TABLET, EXTENDED RELEASE ORAL at 20:14

## 2019-01-01 RX ADMIN — OXYCODONE HYDROCHLORIDE 5 MG: 5 TABLET ORAL at 09:39

## 2019-01-01 RX ADMIN — IPRATROPIUM BROMIDE AND ALBUTEROL SULFATE 1 AMPULE: .5; 3 SOLUTION RESPIRATORY (INHALATION) at 19:22

## 2019-01-01 RX ADMIN — DICLOFENAC 4 G: 10 GEL TOPICAL at 09:42

## 2019-01-01 RX ADMIN — RIVAROXABAN 20 MG: 20 TABLET, FILM COATED ORAL at 18:21

## 2019-01-01 RX ADMIN — METOPROLOL TARTRATE 25 MG: 25 TABLET ORAL at 10:08

## 2019-01-01 RX ADMIN — Medication 10 ML: at 20:15

## 2019-01-01 RX ADMIN — IPRATROPIUM BROMIDE AND ALBUTEROL SULFATE 1 AMPULE: .5; 3 SOLUTION RESPIRATORY (INHALATION) at 08:00

## 2019-01-01 RX ADMIN — DULOXETINE HYDROCHLORIDE 60 MG: 60 CAPSULE, DELAYED RELEASE ORAL at 08:27

## 2019-01-01 RX ADMIN — PANTOPRAZOLE SODIUM 40 MG: 40 TABLET, DELAYED RELEASE ORAL at 05:54

## 2019-01-01 RX ADMIN — PRIMIDONE 50 MG: 50 TABLET ORAL at 22:03

## 2019-01-01 RX ADMIN — MICONAZOLE NITRATE: 20 POWDER TOPICAL at 08:56

## 2019-01-01 RX ADMIN — HYDROMORPHONE HYDROCHLORIDE 2 MG: 2 TABLET ORAL at 10:27

## 2019-01-01 RX ADMIN — RIVAROXABAN 20 MG: 20 TABLET, FILM COATED ORAL at 17:10

## 2019-01-01 RX ADMIN — NYSTATIN 500000 UNITS: 500000 SUSPENSION ORAL at 09:32

## 2019-01-01 RX ADMIN — METFORMIN HYDROCHLORIDE 750 MG: 750 TABLET, EXTENDED RELEASE ORAL at 09:38

## 2019-01-01 RX ADMIN — IPRATROPIUM BROMIDE AND ALBUTEROL SULFATE 1 AMPULE: .5; 3 SOLUTION RESPIRATORY (INHALATION) at 20:31

## 2019-01-01 RX ADMIN — METOPROLOL TARTRATE 25 MG: 25 TABLET ORAL at 22:04

## 2019-01-01 RX ADMIN — FLUTICASONE PROPIONATE 1 SPRAY: 50 SPRAY, METERED NASAL at 08:56

## 2019-01-01 RX ADMIN — POTASSIUM CHLORIDE 20 MEQ: 20 TABLET, EXTENDED RELEASE ORAL at 09:41

## 2019-01-01 RX ADMIN — NYSTATIN 500000 UNITS: 500000 SUSPENSION ORAL at 14:44

## 2019-01-01 RX ADMIN — CIPROFLOXACIN 400 MG: 2 INJECTION, SOLUTION INTRAVENOUS at 17:32

## 2019-01-01 RX ADMIN — Medication 10 ML: at 08:56

## 2019-01-01 RX ADMIN — METOPROLOL TARTRATE 25 MG: 25 TABLET ORAL at 08:28

## 2019-01-01 RX ADMIN — INSULIN LISPRO 1 UNITS: 100 INJECTION, SOLUTION INTRAVENOUS; SUBCUTANEOUS at 12:45

## 2019-01-01 RX ADMIN — HYDROMORPHONE HYDROCHLORIDE 2 MG: 2 TABLET ORAL at 17:32

## 2019-01-01 RX ADMIN — POTASSIUM CHLORIDE 20 MEQ: 20 TABLET, EXTENDED RELEASE ORAL at 08:28

## 2019-01-01 RX ADMIN — MOMETASONE FUROATE AND FORMOTEROL FUMARATE DIHYDRATE 2 PUFF: 200; 5 AEROSOL RESPIRATORY (INHALATION) at 11:14

## 2019-01-01 RX ADMIN — DILTIAZEM HYDROCHLORIDE 360 MG: 180 CAPSULE, COATED, EXTENDED RELEASE ORAL at 08:57

## 2019-01-01 RX ADMIN — GUAIFENESIN 600 MG: 600 TABLET, EXTENDED RELEASE ORAL at 09:38

## 2019-01-01 RX ADMIN — RIVAROXABAN 20 MG: 20 TABLET, FILM COATED ORAL at 08:50

## 2019-01-01 RX ADMIN — ONDANSETRON 4 MG: 2 INJECTION INTRAMUSCULAR; INTRAVENOUS at 08:38

## 2019-01-01 RX ADMIN — OXYCODONE HYDROCHLORIDE 5 MG: 5 TABLET ORAL at 20:14

## 2019-01-01 RX ADMIN — ONDANSETRON 4 MG: 2 INJECTION INTRAMUSCULAR; INTRAVENOUS at 08:08

## 2019-01-01 RX ADMIN — OXYCODONE HYDROCHLORIDE 5 MG: 5 TABLET ORAL at 20:22

## 2019-01-01 RX ADMIN — PREDNISONE 40 MG: 10 TABLET ORAL at 08:27

## 2019-01-01 RX ADMIN — FAMOTIDINE 20 MG: 20 TABLET ORAL at 19:59

## 2019-01-01 RX ADMIN — Medication 10 ML: at 08:47

## 2019-01-01 RX ADMIN — RISPERIDONE 0.25 MG: 0.25 TABLET, FILM COATED ORAL at 20:23

## 2019-01-01 RX ADMIN — FUROSEMIDE 20 MG: 10 INJECTION, SOLUTION INTRAVENOUS at 17:32

## 2019-01-01 RX ADMIN — CLONAZEPAM 0.5 MG: 0.5 TABLET ORAL at 16:05

## 2019-01-01 RX ADMIN — FAMOTIDINE 20 MG: 20 TABLET ORAL at 20:22

## 2019-01-01 RX ADMIN — IPRATROPIUM BROMIDE AND ALBUTEROL SULFATE 1 AMPULE: .5; 3 SOLUTION RESPIRATORY (INHALATION) at 08:53

## 2019-01-01 RX ADMIN — Medication 10 ML: at 09:44

## 2019-01-01 RX ADMIN — NYSTATIN 500000 UNITS: 500000 SUSPENSION ORAL at 20:55

## 2019-01-01 RX ADMIN — NYSTATIN 500000 UNITS: 500000 SUSPENSION ORAL at 11:59

## 2019-01-01 RX ADMIN — NYSTATIN 500000 UNITS: 500000 SUSPENSION ORAL at 12:03

## 2019-01-01 RX ADMIN — CARVEDILOL 12.5 MG: 12.5 TABLET, FILM COATED ORAL at 09:39

## 2019-01-01 RX ADMIN — NYSTATIN 500000 UNITS: 500000 SUSPENSION ORAL at 22:03

## 2019-01-01 RX ADMIN — LISINOPRIL 5 MG: 5 TABLET ORAL at 09:44

## 2019-01-01 RX ADMIN — Medication 10 ML: at 09:41

## 2019-01-01 RX ADMIN — FUROSEMIDE 20 MG: 10 INJECTION, SOLUTION INTRAVENOUS at 17:11

## 2019-01-01 RX ADMIN — BUDESONIDE 500 MCG: 0.5 SUSPENSION RESPIRATORY (INHALATION) at 19:37

## 2019-01-01 RX ADMIN — IPRATROPIUM BROMIDE AND ALBUTEROL SULFATE 1 AMPULE: .5; 3 SOLUTION RESPIRATORY (INHALATION) at 13:52

## 2019-01-01 RX ADMIN — FUROSEMIDE 20 MG: 10 INJECTION, SOLUTION INTRAVENOUS at 17:34

## 2019-01-01 RX ADMIN — DULOXETINE HYDROCHLORIDE 60 MG: 60 CAPSULE, DELAYED RELEASE ORAL at 09:31

## 2019-01-01 RX ADMIN — FLUTICASONE PROPIONATE 1 SPRAY: 50 SPRAY, METERED NASAL at 09:33

## 2019-01-01 RX ADMIN — METFORMIN HYDROCHLORIDE 750 MG: 750 TABLET, EXTENDED RELEASE ORAL at 16:07

## 2019-01-01 RX ADMIN — RIVAROXABAN 20 MG: 20 TABLET, FILM COATED ORAL at 17:53

## 2019-01-01 RX ADMIN — NYSTATIN 500000 UNITS: 500000 SUSPENSION ORAL at 21:56

## 2019-01-01 RX ADMIN — FLUTICASONE PROPIONATE 1 SPRAY: 50 SPRAY, METERED NASAL at 09:48

## 2019-01-01 RX ADMIN — FUROSEMIDE 20 MG: 20 TABLET ORAL at 08:57

## 2019-01-01 RX ADMIN — HYDROMORPHONE HYDROCHLORIDE 2 MG: 2 TABLET ORAL at 09:31

## 2019-01-01 RX ADMIN — BUDESONIDE 500 MCG: 0.5 SUSPENSION RESPIRATORY (INHALATION) at 20:08

## 2019-01-01 RX ADMIN — DULOXETINE HYDROCHLORIDE 60 MG: 60 CAPSULE, DELAYED RELEASE ORAL at 09:39

## 2019-01-01 RX ADMIN — PRIMIDONE 50 MG: 50 TABLET ORAL at 20:55

## 2019-01-01 RX ADMIN — CARVEDILOL 12.5 MG: 12.5 TABLET, FILM COATED ORAL at 16:56

## 2019-01-01 RX ADMIN — HYDROMORPHONE HYDROCHLORIDE 2 MG: 2 TABLET ORAL at 08:38

## 2019-01-01 RX ADMIN — METFORMIN HYDROCHLORIDE 750 MG: 750 TABLET, EXTENDED RELEASE ORAL at 08:57

## 2019-01-01 RX ADMIN — LISINOPRIL 5 MG: 5 TABLET ORAL at 09:41

## 2019-01-01 RX ADMIN — IPRATROPIUM BROMIDE AND ALBUTEROL SULFATE 1 AMPULE: .5; 3 SOLUTION RESPIRATORY (INHALATION) at 21:48

## 2019-01-01 RX ADMIN — MOMETASONE FUROATE AND FORMOTEROL FUMARATE DIHYDRATE 2 PUFF: 200; 5 AEROSOL RESPIRATORY (INHALATION) at 20:45

## 2019-01-01 RX ADMIN — FAMOTIDINE 20 MG: 20 TABLET ORAL at 20:15

## 2019-01-01 RX ADMIN — IPRATROPIUM BROMIDE AND ALBUTEROL SULFATE 1 AMPULE: .5; 3 SOLUTION RESPIRATORY (INHALATION) at 07:52

## 2019-01-01 RX ADMIN — HYDROMORPHONE HYDROCHLORIDE 2 MG: 2 TABLET ORAL at 09:01

## 2019-01-01 RX ADMIN — MICONAZOLE NITRATE: 20 POWDER TOPICAL at 09:48

## 2019-01-01 RX ADMIN — LEVOFLOXACIN 750 MG: 5 INJECTION, SOLUTION INTRAVENOUS at 18:53

## 2019-01-01 RX ADMIN — NYSTATIN 500000 UNITS: 500000 SUSPENSION ORAL at 11:36

## 2019-01-01 RX ADMIN — NYSTATIN 500000 UNITS: 500000 SUSPENSION ORAL at 10:10

## 2019-01-01 RX ADMIN — INSULIN LISPRO 2 UNITS: 100 INJECTION, SOLUTION INTRAVENOUS; SUBCUTANEOUS at 10:01

## 2019-01-01 RX ADMIN — FAMOTIDINE 20 MG: 20 TABLET ORAL at 09:42

## 2019-01-01 RX ADMIN — MOMETASONE FUROATE AND FORMOTEROL FUMARATE DIHYDRATE 2 PUFF: 200; 5 AEROSOL RESPIRATORY (INHALATION) at 19:40

## 2019-01-01 RX ADMIN — LEVOFLOXACIN 750 MG: 5 INJECTION, SOLUTION INTRAVENOUS at 19:08

## 2019-01-01 RX ADMIN — FUROSEMIDE 20 MG: 10 INJECTION, SOLUTION INTRAVENOUS at 08:56

## 2019-01-01 RX ADMIN — IPRATROPIUM BROMIDE AND ALBUTEROL SULFATE 1 AMPULE: .5; 3 SOLUTION RESPIRATORY (INHALATION) at 13:59

## 2019-01-01 RX ADMIN — OXYCODONE HYDROCHLORIDE 5 MG: 5 TABLET ORAL at 08:47

## 2019-01-01 RX ADMIN — HYDROMORPHONE HYDROCHLORIDE 2 MG: 2 TABLET ORAL at 16:25

## 2019-01-01 RX ADMIN — FAMOTIDINE 20 MG: 20 TABLET ORAL at 09:59

## 2019-01-01 RX ADMIN — LISINOPRIL 5 MG: 5 TABLET ORAL at 08:57

## 2019-01-01 RX ADMIN — LISINOPRIL 5 MG: 5 TABLET ORAL at 08:27

## 2019-01-01 RX ADMIN — IPRATROPIUM BROMIDE AND ALBUTEROL SULFATE 1 AMPULE: .5; 3 SOLUTION RESPIRATORY (INHALATION) at 13:17

## 2019-01-01 RX ADMIN — NYSTATIN 500000 UNITS: 500000 SUSPENSION ORAL at 16:43

## 2019-01-01 RX ADMIN — MICONAZOLE NITRATE: 20 POWDER TOPICAL at 10:21

## 2019-01-01 RX ADMIN — NYSTATIN 500000 UNITS: 500000 SUSPENSION ORAL at 17:32

## 2019-01-01 RX ADMIN — IPRATROPIUM BROMIDE AND ALBUTEROL SULFATE 1 AMPULE: .5; 3 SOLUTION RESPIRATORY (INHALATION) at 20:08

## 2019-01-01 RX ADMIN — BUDESONIDE 500 MCG: 0.5 SUSPENSION RESPIRATORY (INHALATION) at 07:22

## 2019-01-01 RX ADMIN — GABAPENTIN 300 MG: 300 CAPSULE ORAL at 09:42

## 2019-01-01 RX ADMIN — FAMOTIDINE 20 MG: 20 TABLET ORAL at 08:50

## 2019-01-01 RX ADMIN — CEFTRIAXONE SODIUM 1 G: 1 INJECTION, POWDER, FOR SOLUTION INTRAMUSCULAR; INTRAVENOUS at 14:40

## 2019-01-01 RX ADMIN — Medication 10 ML: at 10:22

## 2019-01-01 RX ADMIN — DILTIAZEM HYDROCHLORIDE 360 MG: 180 CAPSULE, COATED, EXTENDED RELEASE ORAL at 14:57

## 2019-01-01 RX ADMIN — RIVAROXABAN 20 MG: 20 TABLET, FILM COATED ORAL at 17:37

## 2019-01-01 RX ADMIN — FLUTICASONE PROPIONATE 1 SPRAY: 50 SPRAY, METERED NASAL at 08:29

## 2019-01-01 RX ADMIN — SODIUM CHLORIDE 1000 ML: 9 INJECTION, SOLUTION INTRAVENOUS at 19:50

## 2019-01-01 RX ADMIN — SODIUM CHLORIDE 500 ML: 9 INJECTION, SOLUTION INTRAVENOUS at 19:08

## 2019-01-01 RX ADMIN — DILTIAZEM HYDROCHLORIDE 360 MG: 180 CAPSULE, COATED, EXTENDED RELEASE ORAL at 10:27

## 2019-01-01 RX ADMIN — FAMOTIDINE 20 MG: 20 TABLET ORAL at 21:22

## 2019-01-01 RX ADMIN — FLUTICASONE PROPIONATE 1 SPRAY: 50 SPRAY, METERED NASAL at 10:11

## 2019-01-01 RX ADMIN — LISINOPRIL 5 MG: 5 TABLET ORAL at 10:09

## 2019-01-01 RX ADMIN — BUDESONIDE 500 MCG: 0.5 SUSPENSION RESPIRATORY (INHALATION) at 07:29

## 2019-01-01 RX ADMIN — PRIMIDONE 50 MG: 50 TABLET ORAL at 20:43

## 2019-01-01 RX ADMIN — DULOXETINE HYDROCHLORIDE 60 MG: 60 CAPSULE, DELAYED RELEASE ORAL at 16:28

## 2019-01-01 RX ADMIN — GUAIFENESIN 600 MG: 600 TABLET, EXTENDED RELEASE ORAL at 08:50

## 2019-01-01 RX ADMIN — INSULIN LISPRO 2 UNITS: 100 INJECTION, SOLUTION INTRAVENOUS; SUBCUTANEOUS at 17:33

## 2019-01-01 RX ADMIN — IPRATROPIUM BROMIDE AND ALBUTEROL SULFATE 1 AMPULE: .5; 3 SOLUTION RESPIRATORY (INHALATION) at 12:46

## 2019-01-01 RX ADMIN — INSULIN LISPRO 1 UNITS: 100 INJECTION, SOLUTION INTRAVENOUS; SUBCUTANEOUS at 08:59

## 2019-01-01 RX ADMIN — Medication 10 ML: at 20:22

## 2019-01-01 RX ADMIN — IPRATROPIUM BROMIDE AND ALBUTEROL SULFATE 1 AMPULE: .5; 3 SOLUTION RESPIRATORY (INHALATION) at 13:38

## 2019-01-01 RX ADMIN — RIVAROXABAN 20 MG: 20 TABLET, FILM COATED ORAL at 08:58

## 2019-01-01 RX ADMIN — OXYCODONE HYDROCHLORIDE 5 MG: 5 TABLET ORAL at 15:25

## 2019-01-01 RX ADMIN — OXYCODONE HYDROCHLORIDE 5 MG: 5 TABLET ORAL at 14:40

## 2019-01-01 RX ADMIN — Medication 10 ML: at 20:56

## 2019-01-01 RX ADMIN — MICONAZOLE NITRATE: 20 POWDER TOPICAL at 09:40

## 2019-01-01 RX ADMIN — GABAPENTIN 300 MG: 300 CAPSULE ORAL at 20:23

## 2019-01-01 RX ADMIN — DULOXETINE HYDROCHLORIDE 60 MG: 60 CAPSULE, DELAYED RELEASE ORAL at 08:57

## 2019-01-01 RX ADMIN — RISPERIDONE 0.25 MG: 0.25 TABLET, FILM COATED ORAL at 21:22

## 2019-01-01 RX ADMIN — GUAIFENESIN 600 MG: 600 TABLET, EXTENDED RELEASE ORAL at 09:59

## 2019-01-01 RX ADMIN — INSULIN LISPRO 1 UNITS: 100 INJECTION, SOLUTION INTRAVENOUS; SUBCUTANEOUS at 16:56

## 2019-01-01 RX ADMIN — POTASSIUM CHLORIDE 20 MEQ: 20 TABLET, EXTENDED RELEASE ORAL at 10:09

## 2019-01-01 RX ADMIN — LEVOFLOXACIN 750 MG: 5 INJECTION, SOLUTION INTRAVENOUS at 20:43

## 2019-01-01 RX ADMIN — PANTOPRAZOLE SODIUM 40 MG: 40 TABLET, DELAYED RELEASE ORAL at 05:32

## 2019-01-01 RX ADMIN — Medication 10 ML: at 22:04

## 2019-01-01 RX ADMIN — VANCOMYCIN HYDROCHLORIDE 1000 MG: 1 INJECTION, POWDER, LYOPHILIZED, FOR SOLUTION INTRAVENOUS at 09:54

## 2019-01-01 RX ADMIN — NYSTATIN 500000 UNITS: 500000 SUSPENSION ORAL at 18:21

## 2019-01-01 RX ADMIN — CEFTRIAXONE SODIUM 1 G: 1 INJECTION, POWDER, FOR SOLUTION INTRAMUSCULAR; INTRAVENOUS at 15:25

## 2019-01-01 RX ADMIN — GABAPENTIN 300 MG: 300 CAPSULE ORAL at 15:25

## 2019-01-01 ASSESSMENT — ENCOUNTER SYMPTOMS
DIARRHEA: 0
ABDOMINAL DISTENTION: 0
VOMITING: 0
VOMITING: 0
CONSTIPATION: 0
ABDOMINAL PAIN: 0
STRIDOR: 0
COUGH: 0
CHEST TIGHTNESS: 0
WHEEZING: 0
EYE DISCHARGE: 0
SHORTNESS OF BREATH: 1
SHORTNESS OF BREATH: 0
RECTAL PAIN: 0
SHORTNESS OF BREATH: 0
WHEEZING: 0
ANAL BLEEDING: 0
BLOOD IN STOOL: 0
CONSTIPATION: 0
SHORTNESS OF BREATH: 0
CHOKING: 0
APNEA: 0
STRIDOR: 0
RECTAL PAIN: 0
ABDOMINAL DISTENTION: 0
VOICE CHANGE: 0
DIARRHEA: 0
SORE THROAT: 0
GASTROINTESTINAL NEGATIVE: 1
CONSTIPATION: 0
SHORTNESS OF BREATH: 0
TROUBLE SWALLOWING: 0
COLOR CHANGE: 0
VOMITING: 0
COUGH: 0
EYE DISCHARGE: 0
ABDOMINAL PAIN: 0
ABDOMINAL PAIN: 0
RECTAL PAIN: 0
BACK PAIN: 1
COLOR CHANGE: 0
PHOTOPHOBIA: 0
WHEEZING: 0
VOICE CHANGE: 0
VOMITING: 0
CONSTIPATION: 0
ABDOMINAL DISTENTION: 0
NAUSEA: 0
DIARRHEA: 0
CONSTIPATION: 0
DIARRHEA: 0
BACK PAIN: 0
TROUBLE SWALLOWING: 0
GASTROINTESTINAL NEGATIVE: 1
CONSTIPATION: 0
ABDOMINAL PAIN: 0
TROUBLE SWALLOWING: 0
DIARRHEA: 0
TROUBLE SWALLOWING: 0
TROUBLE SWALLOWING: 0
ABDOMINAL PAIN: 0
COUGH: 0
ABDOMINAL DISTENTION: 0
ANAL BLEEDING: 0
EYE PAIN: 0
COLOR CHANGE: 0
EYE DISCHARGE: 0
EYE DISCHARGE: 0
CHEST TIGHTNESS: 0
COLOR CHANGE: 0
APNEA: 0
ANAL BLEEDING: 0
CHEST TIGHTNESS: 0
VOMITING: 0
COUGH: 0
DIARRHEA: 0
EYE DISCHARGE: 0
VOICE CHANGE: 0
APNEA: 0
BACK PAIN: 1
ABDOMINAL PAIN: 0
NAUSEA: 0
ANAL BLEEDING: 0
ABDOMINAL DISTENTION: 0
APNEA: 0
CONSTIPATION: 0
BACK PAIN: 0
APNEA: 0
APNEA: 0
ABDOMINAL PAIN: 0
VOICE CHANGE: 0
ABDOMINAL DISTENTION: 0
VOICE CHANGE: 0
BACK PAIN: 1
NAUSEA: 0
CONSTIPATION: 0
BLOOD IN STOOL: 0
VOMITING: 0
RECTAL PAIN: 0
NAUSEA: 0
VOICE CHANGE: 0
ABDOMINAL DISTENTION: 0
COUGH: 1
COLOR CHANGE: 0
BACK PAIN: 1
NAUSEA: 0
BACK PAIN: 1
COLOR CHANGE: 0
BLOOD IN STOOL: 0
EYE DISCHARGE: 0
WHEEZING: 0
ABDOMINAL PAIN: 0
SHORTNESS OF BREATH: 1
NAUSEA: 0
VOICE CHANGE: 0
STRIDOR: 0
CHEST TIGHTNESS: 0
DIARRHEA: 1
TROUBLE SWALLOWING: 0
STRIDOR: 0
EYE DISCHARGE: 0
COLOR CHANGE: 0
ANAL BLEEDING: 0
NAUSEA: 0
EYE DISCHARGE: 0
CHEST TIGHTNESS: 0
COUGH: 0
NAUSEA: 0
COUGH: 0
SHORTNESS OF BREATH: 1
EYE DISCHARGE: 0
DIARRHEA: 0
ABDOMINAL PAIN: 0
CHOKING: 0
CHEST TIGHTNESS: 0
COUGH: 1
BACK PAIN: 1
NAUSEA: 0
NAUSEA: 0
APNEA: 0
BLOOD IN STOOL: 0
SHORTNESS OF BREATH: 1
WHEEZING: 0
VOICE CHANGE: 0
EYE DISCHARGE: 0
SORE THROAT: 0
SORE THROAT: 0
ABDOMINAL DISTENTION: 0
VOMITING: 0
APNEA: 0
NAUSEA: 0
CHOKING: 0
CHEST TIGHTNESS: 0
ANAL BLEEDING: 0
SHORTNESS OF BREATH: 1
VOMITING: 0
BLOOD IN STOOL: 0
TROUBLE SWALLOWING: 0
ABDOMINAL DISTENTION: 0
STRIDOR: 0
SORE THROAT: 0
VOMITING: 0
COLOR CHANGE: 0
COLOR CHANGE: 0
SHORTNESS OF BREATH: 1
CONSTIPATION: 0
VOMITING: 0
TROUBLE SWALLOWING: 0
CHOKING: 0
DIARRHEA: 0
VOMITING: 0
ABDOMINAL PAIN: 0
ANAL BLEEDING: 0
TROUBLE SWALLOWING: 0
NAUSEA: 0
COUGH: 1
CONSTIPATION: 0
BLOOD IN STOOL: 0
VOMITING: 0
EYE DISCHARGE: 0
VOMITING: 0
VOICE CHANGE: 0
BLOOD IN STOOL: 0
STRIDOR: 0
VOICE CHANGE: 0
SHORTNESS OF BREATH: 1
NAUSEA: 0
SHORTNESS OF BREATH: 0
BLOOD IN STOOL: 0
COLOR CHANGE: 0
STRIDOR: 0
ANAL BLEEDING: 0
SHORTNESS OF BREATH: 1
FACIAL SWELLING: 0
ABDOMINAL DISTENTION: 0
TROUBLE SWALLOWING: 0
ANAL BLEEDING: 0
CONSTIPATION: 0
COLOR CHANGE: 0
TROUBLE SWALLOWING: 0
ABDOMINAL DISTENTION: 0
ABDOMINAL PAIN: 0
RECTAL PAIN: 0
GASTROINTESTINAL NEGATIVE: 1
BACK PAIN: 1
CONSTIPATION: 0
CHOKING: 0
ABDOMINAL DISTENTION: 0
EYE DISCHARGE: 0
ABDOMINAL PAIN: 0
ABDOMINAL DISTENTION: 0
COUGH: 0
DIARRHEA: 0
NAUSEA: 0
RECTAL PAIN: 0
WHEEZING: 0
WHEEZING: 0
COLOR CHANGE: 0
DIARRHEA: 0
CHOKING: 0
STRIDOR: 0
VOICE CHANGE: 0
SORE THROAT: 0
NAUSEA: 0
BACK PAIN: 1
VOMITING: 0
COLOR CHANGE: 0
COUGH: 1
COUGH: 0
ABDOMINAL PAIN: 0
RESPIRATORY NEGATIVE: 1
SORE THROAT: 0
DIARRHEA: 1
RHINORRHEA: 0
CHEST TIGHTNESS: 0
RESPIRATORY NEGATIVE: 1
SHORTNESS OF BREATH: 0
CONSTIPATION: 0
ABDOMINAL DISTENTION: 0
SHORTNESS OF BREATH: 1
ANAL BLEEDING: 0
BLOOD IN STOOL: 0
APNEA: 0
VOICE CHANGE: 0
ABDOMINAL PAIN: 0
BACK PAIN: 0
TROUBLE SWALLOWING: 0
BLOOD IN STOOL: 0
STRIDOR: 0
RECTAL PAIN: 0
CHEST TIGHTNESS: 0
RECTAL PAIN: 0
RECTAL PAIN: 0
WHEEZING: 0
COLOR CHANGE: 0
ALLERGIC/IMMUNOLOGIC NEGATIVE: 1
WHEEZING: 0
CHEST TIGHTNESS: 0
WHEEZING: 0
EYE DISCHARGE: 0
SORE THROAT: 0
SORE THROAT: 0
COUGH: 1
COUGH: 0
DIARRHEA: 0
CONSTIPATION: 0
WHEEZING: 0
COLOR CHANGE: 0
DIARRHEA: 0
COUGH: 0
TROUBLE SWALLOWING: 0
APNEA: 0
NAUSEA: 0
ABDOMINAL PAIN: 0
RECTAL PAIN: 0
CHOKING: 0
RESPIRATORY NEGATIVE: 1
CHEST TIGHTNESS: 0
SORE THROAT: 0
SHORTNESS OF BREATH: 1
BLOOD IN STOOL: 0
SHORTNESS OF BREATH: 0

## 2019-01-01 ASSESSMENT — PAIN SCALES - GENERAL
PAINLEVEL_OUTOF10: 4
PAINLEVEL_OUTOF10: 2
PAINLEVEL_OUTOF10: 8
PAINLEVEL_OUTOF10: 10
PAINLEVEL_OUTOF10: 7
PAINLEVEL_OUTOF10: 5
PAINLEVEL_OUTOF10: 4
PAINLEVEL_OUTOF10: 0
PAINLEVEL_OUTOF10: 9
PAINLEVEL_OUTOF10: 5
PAINLEVEL_OUTOF10: 10
PAINLEVEL_OUTOF10: 2
PAINLEVEL_OUTOF10: 6
PAINLEVEL_OUTOF10: 10
PAINLEVEL_OUTOF10: 7
PAINLEVEL_OUTOF10: 7
PAINLEVEL_OUTOF10: 9
PAINLEVEL_OUTOF10: 5
PAINLEVEL_OUTOF10: 8
PAINLEVEL_OUTOF10: 2
PAINLEVEL_OUTOF10: 2
PAINLEVEL_OUTOF10: 8
PAINLEVEL_OUTOF10: 2
PAINLEVEL_OUTOF10: 5
PAINLEVEL_OUTOF10: 5
PAINLEVEL_OUTOF10: 8
PAINLEVEL_OUTOF10: 8
PAINLEVEL_OUTOF10: 7
PAINLEVEL_OUTOF10: 6
PAINLEVEL_OUTOF10: 5
PAINLEVEL_OUTOF10: 0
PAINLEVEL_OUTOF10: 6
PAINLEVEL_OUTOF10: 0
PAINLEVEL_OUTOF10: 4
PAINLEVEL_OUTOF10: 0
PAINLEVEL_OUTOF10: 1
PAINLEVEL_OUTOF10: 10
PAINLEVEL_OUTOF10: 7

## 2019-01-01 ASSESSMENT — PAIN DESCRIPTION - PAIN TYPE
TYPE: CHRONIC PAIN

## 2019-01-01 ASSESSMENT — PAIN DESCRIPTION - ONSET: ONSET: ON-GOING

## 2019-01-01 ASSESSMENT — PAIN DESCRIPTION - PROGRESSION: CLINICAL_PROGRESSION: NOT CHANGED

## 2019-01-01 ASSESSMENT — PAIN DESCRIPTION - LOCATION
LOCATION: BACK;SHOULDER
LOCATION: SHOULDER
LOCATION: BACK;SHOULDER
LOCATION: BACK;SHOULDER
LOCATION: GENERALIZED

## 2019-01-01 ASSESSMENT — PAIN - FUNCTIONAL ASSESSMENT
PAIN_FUNCTIONAL_ASSESSMENT: PREVENTS OR INTERFERES SOME ACTIVE ACTIVITIES AND ADLS
PAIN_FUNCTIONAL_ASSESSMENT: 0-10

## 2019-01-01 ASSESSMENT — PAIN DESCRIPTION - DESCRIPTORS
DESCRIPTORS: ACHING

## 2019-01-01 ASSESSMENT — PAIN DESCRIPTION - ORIENTATION: ORIENTATION: RIGHT;LEFT

## 2019-01-01 ASSESSMENT — PAIN DESCRIPTION - FREQUENCY: FREQUENCY: CONTINUOUS

## 2019-01-04 ENCOUNTER — OFFICE VISIT (OUTPATIENT)
Dept: PALLATIVE CARE | Age: 84
End: 2019-01-04
Payer: MEDICARE

## 2019-01-04 VITALS
DIASTOLIC BLOOD PRESSURE: 64 MMHG | SYSTOLIC BLOOD PRESSURE: 110 MMHG | HEART RATE: 70 BPM | RESPIRATION RATE: 16 BRPM | OXYGEN SATURATION: 99 %

## 2019-01-04 DIAGNOSIS — M25.50 ARTHRALGIA, UNSPECIFIED JOINT: ICD-10-CM

## 2019-01-04 DIAGNOSIS — J01.00 ACUTE NON-RECURRENT MAXILLARY SINUSITIS: ICD-10-CM

## 2019-01-04 DIAGNOSIS — C90.00 MULTIPLE MYELOMA, REMISSION STATUS UNSPECIFIED (HCC): ICD-10-CM

## 2019-01-04 DIAGNOSIS — R06.02 SOB (SHORTNESS OF BREATH): ICD-10-CM

## 2019-01-04 DIAGNOSIS — R23.1 PALLOR: ICD-10-CM

## 2019-01-04 DIAGNOSIS — J44.9 CHRONIC OBSTRUCTIVE PULMONARY DISEASE, UNSPECIFIED COPD TYPE (HCC): Primary | ICD-10-CM

## 2019-01-04 PROCEDURE — 99349 HOME/RES VST EST MOD MDM 40: CPT | Performed by: NURSE PRACTITIONER

## 2019-01-04 RX ORDER — LEVOFLOXACIN 500 MG/1
500 TABLET, FILM COATED ORAL DAILY
Qty: 10 TABLET | Refills: 0 | Status: SHIPPED | OUTPATIENT
Start: 2019-01-04 | End: 2019-01-14

## 2019-01-04 RX ORDER — PREDNISONE 10 MG/1
10 TABLET ORAL DAILY
Qty: 30 TABLET | Refills: 0 | Status: SHIPPED | OUTPATIENT
Start: 2019-01-04 | End: 2019-01-16

## 2019-01-04 RX ORDER — GABAPENTIN 300 MG/1
300 CAPSULE ORAL 3 TIMES DAILY
Qty: 270 CAPSULE | Refills: 3 | Status: SHIPPED | OUTPATIENT
Start: 2019-01-04 | End: 2019-01-01

## 2019-01-04 RX ORDER — OXYCODONE HYDROCHLORIDE 5 MG/1
5 CAPSULE ORAL
COMMUNITY
Start: 2019-02-06 | End: 2019-03-08

## 2019-01-04 RX ORDER — BUPRENORPHINE 20 UG/H
1 PATCH TRANSDERMAL
Status: ON HOLD | COMMUNITY
Start: 2018-12-27 | End: 2019-01-01 | Stop reason: SDUPTHER

## 2019-01-04 ASSESSMENT — ENCOUNTER SYMPTOMS
SINUS PAIN: 1
SINUS PRESSURE: 1
ANAL BLEEDING: 0
STRIDOR: 0
SHORTNESS OF BREATH: 1
BACK PAIN: 0
TROUBLE SWALLOWING: 0
EYE DISCHARGE: 0
SORE THROAT: 0
ABDOMINAL DISTENTION: 0
VOMITING: 0
CHOKING: 0
DIARRHEA: 0
ABDOMINAL PAIN: 0
COUGH: 0
CHEST TIGHTNESS: 0
VOICE CHANGE: 1
NAUSEA: 0
APNEA: 0
BLOOD IN STOOL: 0
COLOR CHANGE: 0
RECTAL PAIN: 0
WHEEZING: 0
CONSTIPATION: 0

## 2019-01-07 ENCOUNTER — OFFICE VISIT (OUTPATIENT)
Dept: FAMILY MEDICINE CLINIC | Age: 84
End: 2019-01-07
Payer: MEDICARE

## 2019-01-07 VITALS
BODY MASS INDEX: 36.98 KG/M2 | TEMPERATURE: 98.4 F | DIASTOLIC BLOOD PRESSURE: 70 MMHG | HEART RATE: 114 BPM | HEIGHT: 65 IN | SYSTOLIC BLOOD PRESSURE: 140 MMHG | OXYGEN SATURATION: 98 %

## 2019-01-07 DIAGNOSIS — D49.2 ABNORMAL SKIN GROWTH: ICD-10-CM

## 2019-01-07 DIAGNOSIS — R09.82 POSTNASAL DRIP: Primary | ICD-10-CM

## 2019-01-07 PROCEDURE — 99214 OFFICE O/P EST MOD 30 MIN: CPT | Performed by: FAMILY MEDICINE

## 2019-01-07 ASSESSMENT — ENCOUNTER SYMPTOMS
CONSTIPATION: 0
COUGH: 1
TROUBLE SWALLOWING: 0
DIARRHEA: 0
EYE DISCHARGE: 0
NAUSEA: 0
ABDOMINAL PAIN: 0
VOICE CHANGE: 0
SHORTNESS OF BREATH: 0
COLOR CHANGE: 0
ABDOMINAL DISTENTION: 0

## 2019-01-09 RX ORDER — DILTIAZEM HYDROCHLORIDE 360 MG/1
CAPSULE, EXTENDED RELEASE ORAL
Qty: 90 CAPSULE | Refills: 3 | Status: SHIPPED | OUTPATIENT
Start: 2019-01-09 | End: 2020-01-01

## 2019-01-14 RX ORDER — LISINOPRIL 5 MG/1
TABLET ORAL
Qty: 90 TABLET | Refills: 3 | OUTPATIENT
Start: 2019-01-14

## 2019-01-15 DIAGNOSIS — I10 ESSENTIAL HYPERTENSION: Primary | ICD-10-CM

## 2019-01-15 RX ORDER — LISINOPRIL 5 MG/1
TABLET ORAL
Qty: 90 TABLET | Refills: 3 | Status: SHIPPED | OUTPATIENT
Start: 2019-01-15 | End: 2019-01-29 | Stop reason: SDUPTHER

## 2019-01-18 DIAGNOSIS — J44.9 CHRONIC OBSTRUCTIVE PULMONARY DISEASE, UNSPECIFIED COPD TYPE (HCC): ICD-10-CM

## 2019-01-22 RX ORDER — BUDESONIDE AND FORMOTEROL FUMARATE DIHYDRATE 160; 4.5 UG/1; UG/1
AEROSOL RESPIRATORY (INHALATION)
Qty: 30.6 G | Refills: 3 | OUTPATIENT
Start: 2019-01-22

## 2019-01-24 DIAGNOSIS — J44.9 CHRONIC OBSTRUCTIVE PULMONARY DISEASE, UNSPECIFIED COPD TYPE (HCC): ICD-10-CM

## 2019-01-25 RX ORDER — BUDESONIDE AND FORMOTEROL FUMARATE DIHYDRATE 160; 4.5 UG/1; UG/1
AEROSOL RESPIRATORY (INHALATION)
Qty: 3 INHALER | Refills: 3 | Status: ON HOLD | OUTPATIENT
Start: 2019-01-25 | End: 2019-01-01 | Stop reason: HOSPADM

## 2019-01-29 DIAGNOSIS — I10 ESSENTIAL HYPERTENSION: ICD-10-CM

## 2019-01-29 RX ORDER — LISINOPRIL 5 MG/1
TABLET ORAL
Qty: 90 TABLET | Refills: 3 | Status: SHIPPED | OUTPATIENT
Start: 2019-01-29 | End: 2019-01-01

## 2019-02-04 ENCOUNTER — PROCEDURE VISIT (OUTPATIENT)
Dept: FAMILY MEDICINE CLINIC | Age: 84
End: 2019-02-04
Payer: MEDICARE

## 2019-02-04 VITALS
SYSTOLIC BLOOD PRESSURE: 154 MMHG | OXYGEN SATURATION: 95 % | DIASTOLIC BLOOD PRESSURE: 62 MMHG | BODY MASS INDEX: 36.99 KG/M2 | TEMPERATURE: 97.1 F | HEIGHT: 65 IN | WEIGHT: 222 LBS | HEART RATE: 105 BPM

## 2019-02-04 DIAGNOSIS — D49.2 ABNORMAL SKIN GROWTH: Primary | ICD-10-CM

## 2019-02-04 DIAGNOSIS — L57.0 ACTINIC KERATOSES: ICD-10-CM

## 2019-02-04 PROCEDURE — 11104 PUNCH BX SKIN SINGLE LESION: CPT | Performed by: FAMILY MEDICINE

## 2019-02-04 PROCEDURE — 11105 PUNCH BX SKIN EA SEP/ADDL: CPT | Performed by: FAMILY MEDICINE

## 2019-02-04 ASSESSMENT — ENCOUNTER SYMPTOMS
EYE DISCHARGE: 0
DIARRHEA: 0
NAUSEA: 0
COLOR CHANGE: 0
SHORTNESS OF BREATH: 0
CONSTIPATION: 0
ABDOMINAL DISTENTION: 0
VOICE CHANGE: 0
ABDOMINAL PAIN: 0
TROUBLE SWALLOWING: 0
COUGH: 0

## 2019-02-08 ENCOUNTER — TELEPHONE (OUTPATIENT)
Dept: FAMILY MEDICINE CLINIC | Age: 84
End: 2019-02-08

## 2019-02-11 ENCOUNTER — TELEPHONE (OUTPATIENT)
Dept: FAMILY MEDICINE CLINIC | Age: 84
End: 2019-02-11

## 2019-02-14 RX ORDER — DULOXETIN HYDROCHLORIDE 60 MG/1
60 CAPSULE, DELAYED RELEASE ORAL DAILY
Qty: 30 CAPSULE | Refills: 3 | Status: SHIPPED | OUTPATIENT
Start: 2019-02-14 | End: 2019-01-01 | Stop reason: SDUPTHER

## 2019-02-15 DIAGNOSIS — K92.1 MELENA: Primary | ICD-10-CM

## 2019-02-15 DIAGNOSIS — D64.9 ANEMIA, UNSPECIFIED TYPE: ICD-10-CM

## 2019-03-18 PROBLEM — A41.9 SEPSIS SECONDARY TO UTI (HCC): Status: ACTIVE | Noted: 2019-01-01

## 2019-03-18 PROBLEM — N39.0 SEPSIS SECONDARY TO UTI (HCC): Status: ACTIVE | Noted: 2019-01-01

## 2019-04-09 NOTE — TELEPHONE ENCOUNTER
Pharmacy called back has multiple questions on this request for the free style script that was faxed over. Please call Oneal Beasley back at [address] at [phone]. Attaches to arm free style Merril Relic will need a reader and sensor with this. Free style lite is fine if you want to test with lancet. If you want it to be brand name you will have write brand medically necessary or paolo on the script. If ins is to pay for this it has to have valid dx code and directions, quanities, separate scriptions Etc.

## 2019-04-10 NOTE — TELEPHONE ENCOUNTER
Pt states that she use to see a neurologist, who prescribed this medication for her neuropathy. She no longer sees a neurologist for a while now.

## 2019-04-10 NOTE — TELEPHONE ENCOUNTER
Which she feel comfortable increasing her Risperdal instead and stopping the Mysoline? There are no interactions with that medication and Xarelto. The Mysoline is not really for neuropathy, at least is not the best choice.   Neither his Risperdal but it doesn't interact with the Xarelto

## 2019-04-11 NOTE — TELEPHONE ENCOUNTER
Spoke to pts . States that pt was seen in the hospital at [facility] and they stopped the risperodal completely. ..i asked if  would have pt call me back with an updated medications list, and he state sthat he will bring a current med list in tomorrow 4/12/19 and drop this off.

## 2019-04-15 NOTE — TELEPHONE ENCOUNTER
Nurse Ruma Pelletier from [facility] called to report that patient is c/o diarrhea that started today. She is aware that you will be seeing her tomorrow. She will take her immodium after the third bout of loose stool.

## 2019-04-16 NOTE — TELEPHONE ENCOUNTER
Pt Unsure why the risperdol was stopped. Pt also states that she does not need the mysoline right now anyway she states  That  [facility] already [facility] it to her and she has so many pill bottles.

## 2019-04-16 NOTE — PROGRESS NOTES
Subjective:      Patient Id:  Citlalli Juarez is a 80 y.o. female who presents today with   Chief Complaint   Patient presents with    Shortness of Breath          HPI Seen at home for symptom management follow up for COPD and Multiple Myeloma,demeanor calm and relaxed, NAD, no sedation. Seen in the home setting due to disease related symptoms and debility create heavy physical and financial burden to get to a clinic setting. Positive diarrhea for two days, negative abdominal pain, fever, chills. , myalgias, or natividad blood in stool. Appetite is good. . SOB and edema at baseline. Negative excessive fatigue, fever, chills, myalgias, increased sputum production or cough, nausea, vomiting, chest pain, or orthopnea. Negative significant Sleep disturbance, depression, anxiety, or agitation episodes; denies suicidal or homicidal ideation or signs suggesting existential grief or spiritual pain.      Past Medical History:   Diagnosis Date    A-fib [facility])     Actinic keratoses     Allergic rhinitis, cause unspecified 4/12/2012    Anxiety 4/12/2012    Arthritis of right shoulder region     Asthma     Candida esophagitis ([facility])     GI-Dr. Eloise Dale    Chemotherapy-induced neuropathy ([facility])     Chronic pain syndrome     CKD (chronic kidney disease) stage 3, GFR 30-59 ml/min ([facility]) 9/29/2014    [facility]     Depression     Diabetes ([facility] 08/2017    Diaphoresis 4/15/2016    Dyslipidemia     unable to tolerate statins/tricor    Dysphagia     Fatigue     FH: CAD (coronary artery disease) 5/23/2012    GERD (gastroesophageal reflux disease)     History of DVT of lower extremity     HTN (hypertension)     Hypogammaglobulinemia ([facility]) 10/16/2015    Insomnia 4/12/2012    Intertrigo     Labral tear of shoulder     right    Lipoma     Low back pain 1/7/2015    Lower extremity edema 4/12/2012    Multiple myeloma ([facility])     Neuropathy     hands and feet    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 Jara Grew in [address] SOB (shortness of breath) 4/15/2016    Statin intolerance     Tendinopathy of right shoulder     Tremor 2012    Warfarin anticoagulation      Past Surgical History:   Procedure Laterality Date     SECTION      FOOT SURGERY      HEMORRHOID SURGERY  2014    Dr. Thuan Mcnair Left 2013    ear basal cell    NERVE BLOCK Right 2016    [facility] JOY PALACIOS MD  SUPRASCAPULAR NB PULSED RF    OTHER SURGICAL HISTORY  14    [facility] NERVE BLOCK MEDIAN BRANCH    VENA CAVA FILTER PLACEMENT       Social History     Socioeconomic History    Marital status:      Spouse name: Not on file    Number of children: Not on file    Years of education: Not on file    Highest education level: Not on file   Occupational History    Not on file   Social Needs    Financial resource strain: Not on file    Food insecurity:     Worry: Not on file     Inability: Not on file    Transportation needs:     Medical: Not on file     Non-medical: Not on file   Tobacco Use    Smoking status: Former Smoker     Years: 43.00     Types: Cigarettes     Last attempt to quit: 1992     Years since quittin.3    Smokeless tobacco: Never Used   Substance and Sexual Activity    Alcohol use: Yes     Comment: rare    Drug use: No    Sexual activity: Not on file   Lifestyle    Physical activity:     Days per week: Not on file     Minutes per session: Not on file    Stress: Not on file   Relationships    Social connections:     Talks on phone: Not on file     Gets together: Not on file     Attends Confucianism service: Not on file     Active member of club or organization: Not on file     Attends meetings of clubs or organizations: Not on file     Relationship status: Not on file    Intimate partner violence:     Fear of current or ex partner: Not on file     Emotionally abused: Not on file     Physically abused: Not on file     Forced sexual activity: Not on file   Other Topics Concern    Not on file   Social History Narrative    Lives with , has first floor bedroom and bathroom [place]. Family History   Problem Relation Age of Onset    Cancer Mother     Kidney Disease Mother     Stroke Mother     Cancer Father     Arthritis Sister     Cancer Sister     Diabetes Sister     Arthritis Brother     Cancer Brother     Diabetes Brother     Heart Disease Brother      Allergies   Allergen Reactions    Bactrim      Trouble sleeping    Doxycycline      vomiting    Fenofibrate     Fenofibrate Micronized      muscle aches    Lipitor     Statins      LIPITOR: MUSCLE WEAKNESS AND PAIN    Sulfamethoxazole-Trimethoprim     Levofloxacin Nausea And Vomiting     hyper    Penicillins Swelling and Rash     TONGUE     Current Outpatient Medications on File Prior to Visit   Medication Sig Dispense Refill    potassium chloride (KLOR-CON M) 20 MEQ extended release tablet TAKE 1 TABLET DAILY 90 tablet 3    Blood Glucose Monitoring Suppl (FREESTYLE FREEDOM) KIT 1 kit by Does not apply route 4 times daily (before meals and nightly) With 30 d of supplies.   Brand specific to allow for testing from the arm 1 kit 0    carvedilol (COREG) 12.5 MG tablet Take 1 tablet by mouth 2 times daily (with meals) 60 tablet 3    risperiDONE (RISPERDAL) 0.25 MG tablet Take 1 tablet by mouth nightly 60 tablet 0    DULoxetine (CYMBALTA) 60 MG extended release capsule Take 1 capsule by mouth daily 30 capsule 3    metFORMIN (GLUCOPHAGE-XR) 750 MG extended release tablet TAKE ONE TABLET BY MOUTH TWO TIMES A DAY 60 tablet 5    lisinopril (PRINIVIL;ZESTRIL) 5 MG tablet TAKE 1 TABLET DAILY 90 tablet 3    budesonide-formoterol (SYMBICORT) 160-4.5 MCG/ACT AERO USE 2 INHALATIONS TWICE A DAY 3 Inhaler 3    diltiazem (CARDIZEM CD) 360 MG extended release capsule TAKE 1 CAPSULE DAILY 90 capsule 3    diclofenac sodium 1 % GEL APPLY 4 GRAMS TO AFFECTED AREA 4 TIMES DAILY AS NEEDED      gabapentin (NEURONTIN) 300 MG capsule Take 1 capsule by mouth 3 times daily for 90 days. . 270 capsule 3    clonazePAM (KLONOPIN) 0.5 MG tablet Take 1 tablet by mouth 2 times daily as needed for Anxiety for up to 30 days. . 60 tablet 0    furosemide (LASIX) 20 MG tablet TAKE 2 TABLETS BY MOUTH EVERY MORNING AND 1 ADDITIONAL TABLET IN THE AFTERNOON IF NEEDED 90 tablet 0    ipratropium-albuterol (DUONEB) 0.5-2.5 (3) MG/3ML SOLN nebulizer solution Inhale 3 mLs into the lungs every 4 hours as needed for Shortness of Breath 540 mL 3    esomeprazole (NEXIUM) 40 MG delayed release capsule TAKE ONE CAPSULE BY MOUTH EVERY MORNING BEFORE BREAKFAST 90 capsule 3    nystatin (MYCOSTATIN) 845495 UNIT/GM powder Apply 3 times daily. 56.7 g 5    rivaroxaban (XARELTO) 20 MG TABS tablet Take 20 mg by mouth      primidone (MYSOLINE) 50 MG tablet TAKE 1 TABLET NIGHTLY 90 tablet 3    OXYGEN Portable concentrator for oxygen use at 3 L with ambulation 1 Units 0    Blood Glucose Monitoring Suppl (ACCU-CHEK OMAR) MAURICIO USE TO TEST ONCE DAILY  0    ACCU-CHEK OMAR PLUS strip TEST ONCE DAILY  11    TRUEPLUS LANCETS 28G MISC TEST ONCE DAILY  11    nystatin (MYCOSTATIN) 545135 UNIT/ML suspension Take 5 milliliters by mouth 4 times daily 180 mL 2    prochlorperazine (COMPAZINE) 10 MG tablet Take 1 tablet by mouth every 8 hours as needed (nausea) 120 tablet 0    Dextromethorphan-Guaifenesin (MUCINEX DM)  MG TB12 Take 1 tablet by mouth 2 times daily as needed (cough) 28 tablet 1    Calcium Carbonate-Vitamin D (CALCIUM 600 + D PO) Take 1 tablet by mouth 2 times daily.  fluticasone (FLONASE) 50 MCG/ACT nasal spray 1 spray by Nasal route 2 times daily. No current facility-administered medications on file prior to visit.            Review of Systems   Constitutional: Negative for activity change, appetite change, chills, diaphoresis, fatigue, fever and unexpected weight change. HENT: Negative for drooling, hearing loss, mouth sores, sore throat, trouble swallowing and voice change. Eyes: Negative for discharge and visual disturbance. Respiratory: Positive for shortness of breath. Negative for apnea, cough, choking, chest tightness, wheezing and stridor. Cardiovascular: Negative for chest pain, palpitations and leg swelling. Gastrointestinal: Positive for diarrhea. Negative for abdominal distention, abdominal pain, anal bleeding, blood in stool, constipation, nausea, rectal pain and vomiting. Genitourinary: Negative for difficulty urinating, dysuria, enuresis, frequency and hematuria. Musculoskeletal: Positive for arthralgias. Negative for back pain, gait problem, joint swelling and myalgias. Skin: Negative for color change, pallor, rash and wound. Allergic/Immunologic: Negative for food allergies and immunocompromised state. Neurological: Negative for dizziness, tremors, seizures, syncope, facial asymmetry, speech difficulty, weakness, light-headedness, numbness and headaches. Hematological: Negative for adenopathy. Does not bruise/bleed easily. Psychiatric/Behavioral: Negative for agitation, behavioral problems, confusion, decreased concentration, dysphoric mood, hallucinations, self-injury, sleep disturbance and suicidal ideas. The patient is not nervous/anxious and is not hyperactive. Objective:   /80   Pulse 76   Resp 16   LMP  (LMP Unknown)   SpO2 99%     Physical Exam   Constitutional: She is oriented to person, place, and time. She appears well-developed and well-nourished. No distress. HENT:   Head: Normocephalic and atraumatic. Right Ear: External ear normal.   Left Ear: External ear normal.   Nose: Nose normal.   Mouth/Throat: Oropharynx is clear and moist. No oropharyngeal exudate.    Eyes: Pupils are equal, round, and reactive to light. Conjunctivae and EOM are normal. Right eye exhibits no discharge. Left eye exhibits no discharge. No scleral icterus. Neck: Normal range of motion. Neck supple. No JVD present. No tracheal deviation present. No thyromegaly present. Cardiovascular: Normal rate, regular rhythm and normal heart sounds. Pulmonary/Chest: Effort normal. No stridor. No respiratory distress. She has decreased breath sounds. She has wheezes in the right lower field and the left lower field. She has no rales. She exhibits no tenderness. Abdominal: Soft. Bowel sounds are normal. She exhibits no distension and no mass. There is no tenderness. There is no rebound and no guarding. Musculoskeletal: Normal range of motion. She exhibits no edema, tenderness or deformity. Lymphadenopathy:     She has no cervical adenopathy. Neurological: She is alert and oriented to person, place, and time. Skin: Skin is warm and dry. No rash noted. She is not diaphoretic. No erythema. Psychiatric: She has a normal mood and affect. Her behavior is normal. Thought content normal.         Assessment:       Diagnosis Orders   1. Diarrhea, unspecified type     2. Chronic obstructive pulmonary disease, unspecified COPD type ([facility]     3. SOB (shortness of breath)     4. Palliative care encounter            Plan:    SOB  - Continue COPD Directed therapies   - Call for increased SOB, cough, sputum production, excessive fatigue, fever, chills, or myalgias  - Gentle exercise as tolerated  -  Rinse out mouth after inhaler use.   - COPD ER PACK in place: Call prior to initializing    Diarrhea  - Continue Imodium  - Eat Yogurt daily  - Call for abdominal pain, fever, chills, fatigue        TALI Lincoln - CNP

## 2019-04-30 NOTE — TELEPHONE ENCOUNTER
LOV Nazario then procedure in Feb. Note indicates for pt to F/U in a mos. Tried to call to schedule, rang several time, no answer.    Request received from [address]

## 2019-05-03 NOTE — TELEPHONE ENCOUNTER
Meredith Meyer from [facility] calling stating that this glucometer was to be the freestyle [place], this is the type that is attached to the pt arm and is scanned. The pt was given the freestyle freedom kit- Please correct.

## 2019-05-13 NOTE — TELEPHONE ENCOUNTER
Luz Hanna from [facility] calling [phone]. She is calling about the pt's sugar readings . Today she is 291 before eating. She is looking through her logs lowest is 151 to 307. Pt said she doesn't see endocrinology. Pt is telling her she has never been diagnosed with diabetes. Pt takes Metformin 850 mg once daily. Pt states  That she takes this twice a day they need to confirm how many a day she is supposed to be taking. Her discharge papers from  [place] says to take one daily.             Today pt is vomiting and just not feeling well

## 2019-05-13 NOTE — TELEPHONE ENCOUNTER
Called the cell phone number to speak with Neli Marinelli the nurse at the house. She verified the patient is taking 750 mg of metformin twice a day. This was done by bottle and pill ID. They obtain a glucometer about 1 week ago. She's had average morning measurements between 160 and 196 area she's had evening measurements between 207 and 307. I advised the nurse to have her continue the 750 mrem twice a day. To change it on the medication list.  It is artery on the medication list in her chart here. We will continue to monitor. Likely to increase the dose in the future.

## 2019-05-17 NOTE — PATIENT INSTRUCTIONS
Patient Education        Urinary Tract Infection in Women: Care Instructions  Your Care Instructions    A urinary tract infection, or UTI, is a general term for an infection anywhere between the kidneys and the urethra (where urine comes out). Most UTIs are bladder infections. They often cause pain or burning when you urinate. UTIs are caused by bacteria and can be cured with antibiotics. Be sure to complete your treatment so that the infection goes away. Follow-up care is a key part of your treatment and safety. Be sure to make and go to all appointments, and call your doctor if you are having problems. It's also a good idea to know your test results and keep a list of the medicines you take. How can you care for yourself at home? · Take your antibiotics as directed. Do not stop taking them just because you feel better. You need to take the full course of antibiotics. · Drink extra water and other fluids for the next day or two. This may help wash out the bacteria that are causing the infection. (If you have kidney, heart, or liver disease and have to limit fluids, talk with your doctor before you increase your fluid intake.)  · Avoid drinks that are carbonated or have caffeine. They can irritate the bladder. · Urinate often. Try to empty your bladder each time. · To relieve pain, take a hot bath or lay a heating pad set on low over your lower belly or genital area. Never go to sleep with a heating pad in place. To prevent UTIs  · Drink plenty of water each day. This helps you urinate often, which clears bacteria from your system. (If you have kidney, heart, or liver disease and have to limit fluids, talk with your doctor before you increase your fluid intake.)  · Urinate when you need to. · Urinate right after you have sex. · Change sanitary pads often. · Avoid douches, bubble baths, feminine hygiene sprays, and other feminine hygiene products that have deodorants.   · After going to the bathroom, wipe from front to back. When should you call for help? Call your doctor now or seek immediate medical care if:    · Symptoms such as fever, chills, nausea, or vomiting get worse or appear for the first time.     · You have new pain in your back just below your rib cage. This is called flank pain.     · There is new blood or pus in your urine.     · You have any problems with your antibiotic medicine.    Watch closely for changes in your health, and be sure to contact your doctor if:    · You are not getting better after taking an antibiotic for 2 days.     · Your symptoms go away but then come back. Where can you learn more? Go to [email or web address] and sign in to your [employer] account. Enter A141 in the [employer] box to learn more about \"Urinary Tract Infection in Women: Care Instructions. \"     If you do not have an account, please click on the \"Sign Up Now\" link. Current as of: December 19, 2018  Content Version: 12.0  © 1506-8403 Healthwise, Incorporated. Care instructions adapted under license by [facility] ([facility]). If you have questions about a medical condition or this instruction, always ask your healthcare professional. [address] any warranty or liability for your use of this information.

## 2019-05-17 NOTE — PROGRESS NOTES
Post-Discharge Transitional Care Management Services or Hospital Follow Up    Discharge diagnosis was UTI that recurred while at [facility]    In the meantime, patient has been to pain management may be adjusted some of her medications. This is reflected in our medication list.    Kim English   YOB: 1933    Date of Office Visit:  5/17/2019  Date of Hospital Admission: 3/18/19  Date of Hospital Discharge: 3/22/19  Readmission Risk Score(high >=14%.  Medium >=10%):Readmission Risk Score: 24      Care management risk score Rising risk (score 2-5) and Complex Care (Scores >=6): 9     Non face to face  following discharge, date last encounter closed (first attempt may have been earlier): *No documented post hospital discharge outreach found in the last 14 days *No documented post hospital discharge outreach found in the last 14 days    Call initiated 2 business days of discharge: *No response recorded in the last 14 days     Patient Active Problem List   Diagnosis    Multiple myeloma ([address]    Asthma    A-fib ([address]    HTN (hypertension)    GERD (gastroesophageal reflux disease)    COPD (chronic obstructive pulmonary disease) ([address]    Depression    Chronic pain syndrome    Lower extremity edema    Tremor    Allergic rhinitis    Anxiety    Insomnia    FH: CAD (coronary artery disease)    Diastolic dysfunction    LVH (left ventricular hypertrophy)    Recurrent pneumonia    Hypogammaglobulinemia, acquired ([address]    Statin intolerance    Chemotherapy-induced neuropathy ([address]    Cancer associated pain    Osteoarthritis of spine    CKD (chronic kidney disease) stage 3, GFR 30-59 ml/min ([facility])    Anemia    Right arm pain    Arthritis of right shoulder region    Labral tear of shoulder    Tendinopathy of right shoulder    Intertrigo    Lipoma    Low back pain    Osteoarthritis of lumbosacral spine without myelopathy    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[address]    Hereditary and idiopathic peripheral neuropathy    Fatigue    SOB (shortness of breath)    Diaphoresis    Dysphagia    Actinic keratoses    Diabetes (HCC)    Pneumonia of left lower lobe due to infectious organism ([address]    Sepsis secondary to UTI ([address]    Delirium       Allergies   Allergen Reactions    Bactrim      Trouble sleeping    Doxycycline      vomiting    Fenofibrate     Fenofibrate Micronized      muscle aches    Lipitor     Statins      LIPITOR: MUSCLE WEAKNESS AND PAIN    Sulfamethoxazole-Trimethoprim     Levofloxacin Nausea And Vomiting     hyper    Penicillins Swelling and Rash     TONGUE       Medications listed as ordered at the time of discharge from hospital   [address] Medication Instructions DORA:    Printed on:05/17/19 2692   Medication Information                      ACCU-CHEK OMAR PLUS strip  TEST ONCE DAILY             Blood Glucose Monitoring Suppl (ACCU-CHEK OMAR) MAURICIO  USE TO TEST ONCE DAILY             Blood Glucose Monitoring Suppl (FREESTYLE [place]) KIT  1 kit by Does not apply route 4 times daily (before meals and nightly) With 30 d of supplies. Brand specific to allow for testing from the arm             budesonide-formoterol (SYMBICORT) 160-4.5 MCG/ACT AERO  USE 2 INHALATIONS TWICE A DAY             Calcium Carbonate-Vitamin D (CALCIUM 600 + D PO)  Take 1 tablet by mouth 2 times daily. carvedilol (COREG) 12.5 MG tablet  Take 1 tablet by mouth 2 times daily (with meals)             clonazePAM (KLONOPIN) 0.5 MG tablet  Take 1 tablet by mouth 2 times daily as needed for Anxiety for up to 30 days. .             Dextromethorphan-Guaifenesin (MUCINEX DM)  MG TB12  Take 1 tablet by mouth 2 times daily as needed (cough)             diclofenac sodium 1 % GEL  APPLY 4 GRAMS TO AFFECTED AREA 4 TIMES DAILY AS NEEDED             diltiazem (CARDIZEM CD) 360 MG extended release capsule  TAKE 1 CAPSULE DAILY             DULoxetine (CYMBALTA) 60 MG extended release capsule  Take 1 capsule by mouth daily             esomeprazole (NEXIUM) 40 MG delayed release capsule  TAKE ONE CAPSULE BY MOUTH EVERY MORNING BEFORE BREAKFAST             fluticasone (FLONASE) 50 MCG/ACT nasal spray  1 spray by Nasal route 2 times daily. furosemide (LASIX) 20 MG tablet  TAKE 2 TABLETS BY MOUTH EVERY MORNING AND TAKE 1 ADDITIONAL TABLET IN THE AFTERNOON IF NEEDED             gabapentin (NEURONTIN) 300 MG capsule  Take 1 capsule by mouth 3 times daily for 90 days. Joselin Ac ipratropium-albuterol (DUONEB) 0.5-2.5 (3) MG/3ML SOLN nebulizer solution  Inhale 3 mLs into the lungs every 4 hours as needed for Shortness of Breath             lisinopril (PRINIVIL;ZESTRIL) 5 MG tablet  TAKE 1 TABLET DAILY             metFORMIN (GLUCOPHAGE-XR) 750 MG extended release tablet  TAKE ONE TABLET BY MOUTH TWO TIMES A DAY             nystatin (MYCOSTATIN) 992519 UNIT/GM powder  Apply 3 times daily.              nystatin (MYCOSTATIN) 286352 UNIT/ML suspension  Take 5 milliliters by mouth 4 times daily             OXYGEN  Portable concentrator for oxygen use at 3 L with ambulation             potassium chloride (KLOR-CON M) 20 MEQ extended release tablet  TAKE 1 TABLET DAILY             primidone (MYSOLINE) 50 MG tablet  TAKE 1 TABLET NIGHTLY             prochlorperazine (COMPAZINE) 10 MG tablet  Take 1 tablet by mouth every 8 hours as needed (nausea)             risperiDONE (RISPERDAL) 0.25 MG tablet  Take 1 tablet by mouth nightly             rivaroxaban (XARELTO) 20 MG TABS tablet  Take 20 mg by mouth             TRUEPLUS LANCETS 28G MISC  TEST ONCE DAILY                   Medications marked \"taking\" at this time  Outpatient Medications Marked as Taking for the 5/17/19 encounter (Office Visit) with Ritu Jordan MD   Medication Sig Dispense Refill    furosemide (LASIX) 20 MG tablet TAKE 2 TABLETS BY MOUTH EVERY MORNING AND TAKE 1 ADDITIONAL TABLET IN THE AFTERNOON IF NEEDED 180 tablet 0  primidone (MYSOLINE) 50 MG tablet TAKE 1 TABLET NIGHTLY 90 tablet 3    potassium chloride (KLOR-CON M) 20 MEQ extended release tablet TAKE 1 TABLET DAILY 90 tablet 3    Blood Glucose Monitoring Suppl (FREESTYLE FREEDOM) KIT 1 kit by Does not apply route 4 times daily (before meals and nightly) With 30 d of supplies. Brand specific to allow for testing from the arm 1 kit 0    carvedilol (COREG) 12.5 MG tablet Take 1 tablet by mouth 2 times daily (with meals) 60 tablet 3    risperiDONE (RISPERDAL) 0.25 MG tablet Take 1 tablet by mouth nightly 60 tablet 0    DULoxetine (CYMBALTA) 60 MG extended release capsule Take 1 capsule by mouth daily 30 capsule 3    metFORMIN (GLUCOPHAGE-XR) 750 MG extended release tablet TAKE ONE TABLET BY MOUTH TWO TIMES A DAY 60 tablet 5    lisinopril (PRINIVIL;ZESTRIL) 5 MG tablet TAKE 1 TABLET DAILY 90 tablet 3    budesonide-formoterol (SYMBICORT) 160-4.5 MCG/ACT AERO USE 2 INHALATIONS TWICE A DAY 3 Inhaler 3    diltiazem (CARDIZEM CD) 360 MG extended release capsule TAKE 1 CAPSULE DAILY 90 capsule 3    diclofenac sodium 1 % GEL APPLY 4 GRAMS TO AFFECTED AREA 4 TIMES DAILY AS NEEDED      gabapentin (NEURONTIN) 300 MG capsule Take 1 capsule by mouth 3 times daily for 90 days. . 270 capsule 3    clonazePAM (KLONOPIN) 0.5 MG tablet Take 1 tablet by mouth 2 times daily as needed for Anxiety for up to 30 days. . 60 tablet 0    ipratropium-albuterol (DUONEB) 0.5-2.5 (3) MG/3ML SOLN nebulizer solution Inhale 3 mLs into the lungs every 4 hours as needed for Shortness of Breath 540 mL 3    esomeprazole (NEXIUM) 40 MG delayed release capsule TAKE ONE CAPSULE BY MOUTH EVERY MORNING BEFORE BREAKFAST 90 capsule 3    nystatin (MYCOSTATIN) 251636 UNIT/GM powder Apply 3 times daily.  56.7 g 5    rivaroxaban (XARELTO) 20 MG TABS tablet Take 20 mg by mouth      OXYGEN Portable concentrator for oxygen use at 3 L with ambulation 1 Units 0    Blood Glucose Monitoring Suppl (ACCU-CHEK OMAR) swelling. Skin: Negative for color change and rash. Allergic/Immunologic: Negative for environmental allergies and food allergies. Neurological: Negative for speech difficulty and weakness. Hematological: Does not bruise/bleed easily. Psychiatric/Behavioral: Positive for confusion ( None now, but that's what happened when she had the urinary tract infection. She got very confused. ). Negative for agitation and behavioral problems. Vitals:    05/17/19 1217   BP: 138/82   Pulse: 106   Temp: 96.5 °F (35.8 °C)   SpO2: 95%   Height: 5' 5.5\" (1.664 m)     Body mass index is 36.33 kg/m². Wt Readings from Last 3 Encounters:   03/22/19 221 lb 11.2 oz (100.6 kg)   02/04/19 222 lb (100.7 kg)   09/18/18 222 lb 3.2 oz (100.8 kg)     BP Readings from Last 3 Encounters:   05/17/19 138/82   04/16/19 130/80   03/22/19 (!) 150/66       Physical Exam   Constitutional: She appears well-developed and well-nourished. She is cooperative. She does not appear ill. Vitals signs reviewed   HENT:   Head: Normocephalic and atraumatic. Right Ear: Hearing and external ear normal.   Left Ear: Hearing and external ear normal.   Nose: No rhinorrhea or nasal deformity. Normal lip contour and movement with conversations   Eyes: Pupils are equal, round, and reactive to light. Conjunctivae, EOM and lids are normal. Right eye exhibits no discharge. Left eye exhibits no discharge. Right conjunctiva has no hemorrhage. Left conjunctiva has no hemorrhage. Right eye exhibits no nystagmus. Left eye exhibits no nystagmus. Neck: Full passive range of motion without pain. Neck supple. Normal carotid pulses and no JVD present. No tracheal tenderness present. Carotid bruit is not present. No tracheal deviation and normal range of motion present. No thyroid mass and no thyromegaly present. Cardiovascular: Normal rate, regular rhythm, S1 normal and S2 normal. PMI displaced: normal location PMI.  Exam reveals no gallop, no S3 and no friction

## 2019-05-22 NOTE — PROGRESS NOTES
Subjective:      Patient Id:  Eddy Sevilla is a 80 y.o. female who presents today with   Chief Complaint   Patient presents with    Shortness of Breath          HPI Seen at home for symptom management follow up for multiple myeloma and COPD, demeanor calm and relaxed, NAD, no sedation. Seen in the home setting due to disease related symptoms and debility create heavy physical and financial burden to get to a clinic setting. PT will be ending this week, Dilan Arreguin found it beneficial and is better able to perform her own ADLs. Chronic pain managed by a pain physician, at baseline today. Weight stable, Appetite is good. No GI or  concerns. No natividad blood in stool or urine. Fatigue, SOB, and edema at baseline. Negative fever, chills, myalgias, increased sputum production or cough, nausea, vomiting, chest pain, or orthopnea. Negative significant Sleep disturbance, depression, anxiety, or agitation episodes or signs suggesting existential grief or spiritual pain.      Past Medical History:   Diagnosis Date    A-fib [facility])     Actinic keratoses     Allergic rhinitis, cause unspecified 4/12/2012    Anxiety 4/12/2012    Arthritis of right shoulder region     Asthma     Candida esophagitis (HCC)     GI-Dr. Dore Scheuermann    Chem[facility] neuropathy ([facility])     Chronic pain syndrome     CKD (chronic kidney disease) stage 3, GFR 30-59 ml/min ([facility]) 9/29/2014    COPD [facility] obstructive [facility] disease) [facility] [address]     Depression     Diabetes ([facility] [address] 08/2017    Diaphoresis 4/15/2016    Dyslipidemia     unable to tolerate statins/tricor    Dysphagia     Fatigue     FH: CAD (coronary artery disease) 5/23/2012    GERD (gastroesophageal reflux disease)     History of DVT of lower extremity     HTN (hypertension)     Hypogammaglobulinemia ([facility] [address] 10/16/2015    Insomnia 4/12/2012    Intertrigo     Labral tear of shoulder     right    Lipoma     Low back pain 1/7/2015    Lower extremity edema 4/12/2012    Multiple myeloma (HCC)     Neuropathy     hands and feet    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 Chelita Moreno in [facility] SOB (shortness of breath) 4/15/2016    Statin intolerance     Tendinopathy of right shoulder     Tremor 2012    Warfarin anticoagulation      Past Surgical History:   Procedure Laterality Date     SECTION      FOOT SURGERY      HEMORRHOID SURGERY  2014    Dr. Brigid Hoyos Left 2013    ear basal cell    NERVE BLOCK Right 2016    [facility] JOY PALACIOS MD  SUPRASCAPULAR NB PULSED RF    OTHER SURGICAL HISTORY  14    [facility] NERVE BLOCK [facility]IAN [facility]    VENA CAVA FILTER PLACEMENT       Social History     Socioeconomic History    Marital status:      Spouse name: Not on file    Number of children: Not on file    Years of education: Not on file    Highest education level: Not on file   Occupational History    Not on file   Social Needs    Financial resource strain: Not on file    Food insecurity:     Worry: Not on file     Inability: Not on file    Transportation needs:     Medical: Not on file     Non-medical: Not on file   Tobacco Use    Smoking status: Former Smoker     Years: 43.00     Types: Cigarettes     Last attempt to quit: 1992     Years since quittin.4    Smokeless tobacco: Never Used   Substance and Sexual Activity    Alcohol use: Yes     Comment: rare    Drug use: No    Sexual activity: Not on file   Lifestyle    Physical activity:     Days per week: Not on file     Minutes per session: Not on file    Stress: Not on file   Relationships    Social connections:     Talks on phone: Not on file     Gets together: Not on file     Attends Congregational service: Not on file     Active member of club or organization: Not on file     Attends meetings of clubs or organizations: Not on file Relationship status: Not on file    Intimate partner violence:     Fear of current or ex partner: Not on file     Emotionally abused: Not on file     Physically abused: Not on file     Forced sexual activity: Not on file   Other Topics Concern    Not on file   Social History Narrative    Lives with , has first floor bedroom and bathroom [place]. Family History   Problem Relation Age of Onset    Cancer Mother     Kidney Disease Mother     Stroke Mother     Cancer Father     Arthritis Sister     Cancer Sister     Diabetes Sister     Arthritis Brother     Cancer Brother     Diabetes Brother     Heart Disease Brother      Allergies   Allergen Reactions    Bactrim      Trouble sleeping    Doxycycline      vomiting    Fenofibrate     Fenofibrate Micronized      muscle aches    Lipitor     Statins      LIPITOR: MUSCLE WEAKNESS AND PAIN    Sulfamethoxazole-Trimethoprim     Levofloxacin Nausea And Vomiting     hyper    Penicillins Swelling and Rash     TONGUE     Current Outpatient Medications on File Prior to Visit   Medication Sig Dispense Refill    furosemide (LASIX) 20 MG tablet TAKE 2 TABLETS BY MOUTH EVERY MORNING AND TAKE 1 ADDITIONAL TABLET IN THE AFTERNOON IF NEEDED 180 tablet 0    primidone (MYSOLINE) 50 MG tablet TAKE 1 TABLET NIGHTLY 90 tablet 3    potassium chloride (KLOR-CON M) 20 MEQ extended release tablet TAKE 1 TABLET DAILY 90 tablet 3    Blood Glucose Monitoring Suppl (FREESTYLE FREEDOM) KIT 1 kit by Does not apply route 4 times daily (before meals and nightly) With 30 d of supplies.   Brand specific to allow for testing from the arm 1 kit 0    carvedilol (COREG) 12.5 MG tablet Take 1 tablet by mouth 2 times daily (with meals) 60 tablet 3    risperiDONE (RISPERDAL) 0.25 MG tablet Take 1 tablet by mouth nightly 60 tablet 0    DULoxetine (CYMBALTA) 60 MG extended release capsule Take 1 capsule by mouth daily 30 capsule 3    metFORMIN (GLUCOPHAGE-XR) 750 MG extended release tablet TAKE ONE TABLET BY MOUTH TWO TIMES A DAY 60 tablet 5    lisinopril (PRINIVIL;ZESTRIL) 5 MG tablet TAKE 1 TABLET DAILY 90 tablet 3    budesonide-formoterol (SYMBICORT) 160-4.5 MCG/ACT AERO USE 2 INHALATIONS TWICE A DAY 3 Inhaler 3    diltiazem (CARDIZEM CD) 360 MG extended release capsule TAKE 1 CAPSULE DAILY 90 capsule 3    diclofenac sodium 1 % GEL APPLY 4 GRAMS TO AFFECTED AREA 4 TIMES DAILY AS NEEDED      gabapentin (NEURONTIN) 300 MG capsule Take 1 capsule by mouth 3 times daily for 90 days. . 270 capsule 3    clonazePAM (KLONOPIN) 0.5 MG tablet Take 1 tablet by mouth 2 times daily as needed for Anxiety for up to 30 days. . 60 tablet 0    ipratropium-albuterol (DUONEB) 0.5-2.5 (3) MG/3ML SOLN nebulizer solution Inhale 3 mLs into the lungs every 4 hours as needed for Shortness of Breath 540 mL 3    esomeprazole (NEXIUM) 40 MG delayed release capsule TAKE ONE CAPSULE BY MOUTH EVERY MORNING BEFORE BREAKFAST 90 capsule 3    nystatin (MYCOSTATIN) 763620 UNIT/GM powder Apply 3 times daily. 56.7 g 5    rivaroxaban (XARELTO) 20 MG TABS tablet Take 20 mg by mouth      OXYGEN Portable concentrator for oxygen use at 3 L with ambulation 1 Units 0    Blood Glucose Monitoring Suppl (ACCU-CHEK OMAR) MAURICIO USE TO TEST ONCE DAILY  0    ACCU-CHEK OMAR PLUS strip TEST ONCE DAILY  11    TRUEPLUS LANCETS 28G MISC TEST ONCE DAILY  11    nystatin (MYCOSTATIN) 838094 UNIT/ML suspension Take 5 milliliters by mouth 4 times daily 180 mL 2    prochlorperazine (COMPAZINE) 10 MG tablet Take 1 tablet by mouth every 8 hours as needed (nausea) 120 tablet 0    Dextromethorphan-Guaifenesin (MUCINEX DM)  MG TB12 Take 1 tablet by mouth 2 times daily as needed (cough) 28 tablet 1    Calcium Carbonate-Vitamin D (CALCIUM 600 + D PO) Take 1 tablet by mouth 2 times daily.  fluticasone (FLONASE) 50 MCG/ACT nasal spray 1 spray by Nasal route 2 times daily.          No current facility-administered medications on file prior to visit. Review of Systems   Constitutional: Positive for fatigue. Negative for activity change, appetite change, chills, diaphoresis, fever and unexpected weight change. HENT: Negative for drooling, hearing loss, mouth sores, sore throat, trouble swallowing and voice change. Eyes: Negative for discharge and visual disturbance. Respiratory: Positive for shortness of breath. Negative for apnea, cough, choking, chest tightness, wheezing and stridor. Cardiovascular: Negative for chest pain, palpitations and leg swelling. Gastrointestinal: Negative for abdominal distention, abdominal pain, anal bleeding, blood in stool, constipation, diarrhea, nausea, rectal pain and vomiting. Genitourinary: Negative for difficulty urinating, dysuria, enuresis, frequency and hematuria. Musculoskeletal: Positive for arthralgias and back pain. Negative for gait problem, joint swelling and myalgias. Skin: Negative for color change, pallor, rash and wound. Allergic/Immunologic: Negative for food allergies and immunocompromised state. Neurological: Negative for dizziness, tremors, seizures, syncope, facial asymmetry, speech difficulty, weakness, light-headedness, numbness and headaches. Hematological: Negative for adenopathy. Does not bruise/bleed easily. Psychiatric/Behavioral: Negative for agitation, behavioral problems, confusion, decreased concentration, dysphoric mood, hallucinations, self-injury, sleep disturbance and suicidal ideas. The patient is not nervous/anxious and is not hyperactive. Objective:   BP (!) 160/100   Pulse 90   Resp 16   LMP  (LMP Unknown)   SpO2 95%     Physical Exam      Assessment:       Diagnosis Orders   1. Other fatigue     2. SOB (shortness of breath)     3. Chronic obstructive pulmonary disease, unspecified COPD type ([facility] [address]     4. History of multiple myeloma     5.  Palliative care encounter            Plan:   Fatigue  - Discussed pacing and prioritizing  - Discussed setting realistic goals  - Exercise as tolerated  -  Maximize protein in diet    SOB  - Continue COPD Directed therapies   - Call for increased SOB, cough, sputum production, excessive fatigue, fever, chills, or myalgias  - Gentle exercise as tolerated  -  Rinse out mouth after inhaler use.   - COPD ER PACK in place: Call prior to initializing     TALI Villalpando - CNP

## 2019-05-28 NOTE — PROGRESS NOTES
Subjective:      Patient ID: Tay Duncan is a 80 y.o. female who presents for:  No chief complaint on file. This is an 27-year-old female who was referred for melena and possible upper endoscopy. I reviewed results from September 2017 from an EGD and a colonoscopy. EGD biopsies were unremarkable. Colonoscopy polypectomies were all benign. She currently on oral anticoagulation. She is on a number of other medications which were reviewed as well. Patient denies any abdominal pain. She denies any bright red blood per rectum. I reviewed a CBC from March which had a hematocrit at 31. She is unaware if she is on iron supplement.       Past Medical History:   Diagnosis Date    A-fib [facility])     Actinic keratoses     Allergic rhinitis, cause unspecified 4/12/2012    Anxiety 4/12/2012    Arthritis of right shoulder region     Asthma     Candida esophagitis [facility]     GI-Dr. 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[facility])     Chronic pain syndrome     CKD (chronic kidney disease) stage 3, GFR 30-59 ml/min ([facility]) 9/29/2014    COPD (chronic obstructive pulmonary disease) ([facility] [address]     Depression     Diabetes ([facility] [address] 08/2017    Diaphoresis 4/15/2016    Dyslipidemia     unable to tolerate statins/tricor    Dysphagia     Fatigue     FH: CAD (coronary artery disease) 5/23/2012    GERD (gastroesophageal reflux disease)     History of DVT of lower extremity     HTN (hypertension)     Hypogammaglobulinemia ([facility] 10/16/2015    Insomnia 4/12/2012    Intertrigo     Labral tear of shoulder     right    Lipoma     Low back pain 1/7/2015    Lower extremity edema 4/12/2012    Multiple myeloma ([facility]     Neuropathy     hands and feet    Osteoarthritis of lumbosacral spine without myelopathy     Osteoarthritis of spine     PE (pulmonary embolism)     Right arm pain     Seizures ([facility]     Skin cancer, basal cell     [facility] Yasmany Riojas in [facility]     SOB (shortness of breath) 4/15/2016    Statin intolerance     Tendinopathy of right shoulder     Tremor 2012    Warfarin anticoagulation      Past Surgical History:   Procedure Laterality Date     SECTION      FOOT SURGERY      HEMORRHOID SURGERY  2014    Dr. Vivas Pouch Left 2013    ear basal cell    NERVE BLOCK Right 2016    [facility] JOY PLAACI[facility] MD  SUPRASCAPULAR NB PULSED RF    OTHER SURGICAL HISTORY  14    [facility] NERVE BLOCK MEDIAN BRANCH    VENA CAVA FILTER PLACEMENT       Social History     Socioeconomic History    Marital status:      Spouse name: Not on file    Number of children: Not on file    Years of education: Not on file    Highest education level: Not on file   Occupational History    Not on file   Social Needs    Financial resource strain: Not on file    Food insecurity:     Worry: Not on file     Inability: Not on file    Transportation needs:     Medical: Not on file     Non-medical: Not on file   Tobacco Use    Smoking status: Former Smoker     Years: 43.00     Types: Cigarettes     Last attempt to quit: 1992     Years since quittin.4    Smokeless tobacco: Never Used   Substance and Sexual Activity    Alcohol use: Yes     Comment: rare    Drug use: No    Sexual activity: Not on file   Lifestyle    Physical activity:     Days per week: Not on file     Minutes per session: Not on file    Stress: Not on file   Relationships    Social connections:     Talks on phone: Not on file     Gets together: Not on file     Attends Yarsanism service: Not on file     Active member of club or organization: Not on file     Attends meetings of clubs or organizations: Not on file     Relationship status: Not on file    Intimate partner violence:     Fear of current or ex partner: Not on file     Emotionally abused: Not on file     Physically abused: Not on file     Forced sexual activity: Not on file   Other Topics Concern    Not on file   Social History Narrative    Lives with , has first floor bedroom and bathroom as they [place] a ranch. Family History   Problem Relation Age of Onset   Vin Drain Cancer Mother     Kidney Disease Mother     Stroke Mother     Cancer Father     Arthritis Sister     Cancer Sister     Diabetes Sister     Arthritis Brother     Cancer Brother     Diabetes Brother     Heart Disease Brother      Allergies:  Bactrim; Doxycycline; Fenofibrate; Fenofibrate micronized; Lipitor; Statins; Sulfamethoxazole-trimethoprim; Levofloxacin; and Penicillins    Review of Systems   Constitutional: Positive for activity change and fatigue. Negative for fever. Respiratory: Positive for shortness of breath. Negative for chest tightness. On home oxygen   Cardiovascular: Negative for chest pain and palpitations. Gastrointestinal: Negative for abdominal distention, abdominal pain, anal bleeding, constipation, diarrhea, rectal pain and vomiting. Musculoskeletal: Positive for arthralgias, back pain, gait problem and joint swelling. Neurological: Positive for weakness. Negative for dizziness, facial asymmetry, light-headedness and headaches. Psychiatric/Behavioral: Positive for decreased concentration. Negative for agitation and confusion. The patient is nervous/anxious. Objective:    Pulse 108   Temp 96.7 °F (35.9 °C) (Temporal)   Ht 5' 5\" (1.651 m)   Wt 221 lb (100.2 kg)   LMP  (LMP Unknown)   SpO2 97%   BMI 36.78 kg/m²     Physical Exam   Constitutional: She is oriented to person, place, and time. She appears well-developed and well-nourished. HENT:   Head: Normocephalic and atraumatic. Eyes: Pupils are equal, round, and reactive to light. Neck: Normal range of motion. Neck supple. Cardiovascular: Normal rate, regular rhythm and normal heart sounds. Pulmonary/Chest: Effort normal and breath sounds normal. No respiratory distress. She has no wheezes. Abdominal: She exhibits no distension. There is no tenderness. No hernia.    Genitourinary: Genitourinary Comments: Anal inspection shows no evidence of thrombosed hemorrhoids. Digital exam showed no palpable masses. Normal appearing stool on the gloved finger. Musculoskeletal: Normal range of motion. Neurological: She is alert and oriented to person, place, and time. Skin: Skin is warm and dry. No rash noted. No erythema. No pallor. Psychiatric: She has a normal mood and affect. Her behavior is normal. Judgment normal.            Assessment/Plan:          Diagnosis Orders   1. Melena        With her  present I discussed the prohibitive aspects of repeating any further invasive tests on her. Given her age, her comorbidity, as well as her physical exam requiring home oxygen as well as wheelchair dependence and significant arthritic and orthopedic complaints, I believe that doing a repeat upper endoscopy would not give us any better information. I suspect she has gastritis related to her multiple medications aggravated by her oral anticoagulation. I would not stop any of these medications given their indication. I believe that repeating an upper endoscopy would not change our management and would only put her at potential risk for complications. Patient and  both agree. I would be happy to answer any further questions that she has if they arise. Please note this report has beenpartially produced using speech recognition software and may cause contain errors related to that system including grammar, punctuation and spelling as well as words and phrases that may seem inappropriate.  If there arequestions or concerns please feel free to contact me to clarify

## 2019-05-31 NOTE — TELEPHONE ENCOUNTER
[facility] calling [facility] they are discharging pt from home care.       FYI

## 2019-06-26 NOTE — PROGRESS NOTES
Subjective:      Patient Id:  Beau Givens is a 80 y.o. female who presents today with   Chief Complaint   Patient presents with    Shortness of Breath          HPI Seen at home for symptom management follow up rt Multiple Myeloma, demeanor calm and relaxed, NAD, no sedation. Seen in the home setting due to disease related symptoms and debility create heavy physical and financial burden to get to a clinic setting. Multi joint paina managened by a pain mangement group, however Soren Burks complains of increasing \" all over pain, described a sa burning ache, radiates down bilateral leh-gs and arms, no known aggravators. She takes Duloxetine 60 mg po daily, but she has only been taking Gabapentin BID not TID as prescribed. Soren Burks and her  are unsure why they decided to decrease the dose. Weight stable, Appetite is good. No GI or  concerns. No natividad blood in stool or urine. SOB and edema at baseline. Negative excessive fatigue, fever, chills, myalgias, increased sputum production or cough, nausea, vomiting, chest pain, or orthopnea. Negative significant Sleep disturbance, depression, anxiety, or agitation episodes.     Past Medical History:   Diagnosis Date    A-fib [facility])     Actinic keratoses     Allergic rhinitis, cause unspecified 4/12/2012    Anxiety 4/12/2012    Arthritis of right shoulder region     Asthma     Candida esophagitis (HCC)     GI-Dr. Bhakta    Chemotherapy-induced neuropathy ([facility])     Chronic pain syndrome     CKD (chronic kidney disease) stage 3, GFR 30-59 ml/min ([facility]) 9/29/2014    COPD [facility]     Depression     Diabetes ([facility] 08/2017    Diaphoresis 4/15/2016    Dyslipidemia     unable to tolerate statins/tricor    Dysphagia     Fatigue     FH: CAD (coronary artery disease) 5/23/2012    GERD (gastroesophageal reflux disease)     History of DVT of lower extremity     HTN (hypertension)     Hypogammaglobulinemia ([facility] 10/16/2015    Attends Christian service: Not on file     Active member of club or organization: Not on file     Attends meetings of clubs or organizations: Not on file     Relationship status: Not on file    Intimate partner violence:     Fear of current or ex partner: Not on file     Emotionally abused: Not on file     Physically abused: Not on file     Forced sexual activity: Not on file   Other Topics Concern    Not on file   Social History Narrative    Lives with , has first floor bedroom and bathroom [place]. Family History   Problem Relation Age of Onset    Cancer Mother     Kidney Disease Mother     Stroke Mother     Cancer Father     Arthritis Sister     Cancer Sister     Diabetes Sister     Arthritis Brother     Cancer Brother     Diabetes Brother     Heart Disease Brother      Allergies   Allergen Reactions    Bactrim      Trouble sleeping    Doxycycline      vomiting    Fenofibrate     Fenofibrate Micronized      muscle aches    Lipitor     Statins      LIPITOR: MUSCLE WEAKNESS AND PAIN    Sulfamethoxazole-Trimethoprim     Levofloxacin Nausea And Vomiting     hyper    Penicillins Swelling and Rash     TONGUE     Current Outpatient Medications on File Prior to Visit   Medication Sig Dispense Refill    furosemide (LASIX) 20 MG tablet TAKE 2 TABLETS BY MOUTH EVERY MORNING AND TAKE 1 ADDITIONAL TABLET IN THE AFTERNOON IF NEEDED 180 tablet 0    primidone (MYSOLINE) 50 MG tablet TAKE 1 TABLET NIGHTLY 90 tablet 3    potassium chloride (KLOR-CON M) 20 MEQ extended release tablet TAKE 1 TABLET DAILY 90 tablet 3    Blood Glucose Monitoring Suppl (FREESTYLE FREEDOM) KIT 1 kit by Does not apply route 4 times daily (before meals and nightly) With 30 d of supplies.   Brand specific to allow for testing from the arm 1 kit 0    carvedilol (COREG) 12.5 MG tablet Take 1 tablet by mouth 2 times daily (with meals) 60 tablet 3    DULoxetine (CYMBALTA) 60 MG extended release capsule Take 1 capsule by mouth daily 30 capsule 3    metFORMIN (GLUCOPHAGE-XR) 750 MG extended release tablet TAKE ONE TABLET BY MOUTH TWO TIMES A DAY 60 tablet 5    lisinopril (PRINIVIL;ZESTRIL) 5 MG tablet TAKE 1 TABLET DAILY 90 tablet 3    budesonide-formoterol (SYMBICORT) 160-4.5 MCG/ACT AERO USE 2 INHALATIONS TWICE A DAY 3 Inhaler 3    diltiazem (CARDIZEM CD) 360 MG extended release capsule TAKE 1 CAPSULE DAILY 90 capsule 3    diclofenac sodium 1 % GEL APPLY 4 GRAMS TO AFFECTED AREA 4 TIMES DAILY AS NEEDED      gabapentin (NEURONTIN) 300 MG capsule Take 1 capsule by mouth 3 times daily for 90 days. . 270 capsule 3    clonazePAM (KLONOPIN) 0.5 MG tablet Take 1 tablet by mouth 2 times daily as needed for Anxiety for up to 30 days. . 60 tablet 0    ipratropium-albuterol (DUONEB) 0.5-2.5 (3) MG/3ML SOLN nebulizer solution Inhale 3 mLs into the lungs every 4 hours as needed for Shortness of Breath 540 mL 3    esomeprazole (NEXIUM) 40 MG delayed release capsule TAKE ONE CAPSULE BY MOUTH EVERY MORNING BEFORE BREAKFAST 90 capsule 3    nystatin (MYCOSTATIN) 289689 UNIT/GM powder Apply 3 times daily. 56.7 g 5    rivaroxaban (XARELTO) 20 MG TABS tablet Take 20 mg by mouth      OXYGEN Portable concentrator for oxygen use at 3 L with ambulation 1 Units 0    Blood Glucose Monitoring Suppl (ACCU-CHEK OMAR) MAURICIO USE TO TEST ONCE DAILY  0    ACCU-CHEK OMAR PLUS strip TEST ONCE DAILY  11    TRUEPLUS LANCETS 28G MISC TEST ONCE DAILY  11    nystatin (MYCOSTATIN) 377655 UNIT/ML suspension Take 5 milliliters by mouth 4 times daily 180 mL 2    prochlorperazine (COMPAZINE) 10 MG tablet Take 1 tablet by mouth every 8 hours as needed (nausea) 120 tablet 0    Dextromethorphan-Guaifenesin (MUCINEX DM)  MG TB12 Take 1 tablet by mouth 2 times daily as needed (cough) 28 tablet 1    Calcium Carbonate-Vitamin D (CALCIUM 600 + D PO) Take 1 tablet by mouth 2 times daily.         fluticasone (FLONASE) 50 MCG/ACT nasal spray 1 spray by Nasal route 2 times daily. No current facility-administered medications on file prior to visit. Review of Systems   Constitutional: Positive for fatigue. Negative for activity change, appetite change, chills, diaphoresis, fever and unexpected weight change. HENT: Negative for drooling, hearing loss, mouth sores, sore throat, trouble swallowing and voice change. Eyes: Negative for discharge and visual disturbance. Respiratory: Positive for shortness of breath. Negative for apnea, cough, choking, chest tightness, wheezing and stridor. Cardiovascular: Negative for chest pain, palpitations and leg swelling. Gastrointestinal: Negative for abdominal distention, abdominal pain, anal bleeding, blood in stool, constipation, diarrhea, nausea, rectal pain and vomiting. Genitourinary: Negative for difficulty urinating, dysuria, enuresis, frequency and hematuria. Musculoskeletal: Positive for arthralgias and back pain. Negative for gait problem, joint swelling and myalgias. Skin: Negative for color change, pallor, rash and wound. Allergic/Immunologic: Negative for food allergies and immunocompromised state. Neurological: Negative for dizziness, tremors, seizures, syncope, facial asymmetry, speech difficulty, weakness, light-headedness, numbness and headaches. Hematological: Negative for adenopathy. Does not bruise/bleed easily. Psychiatric/Behavioral: Negative for agitation, behavioral problems, confusion, decreased concentration, dysphoric mood, hallucinations, self-injury, sleep disturbance and suicidal ideas. The patient is not nervous/anxious and is not hyperactive. Objective:   LMP  (LMP Unknown)     Physical Exam   Constitutional: She is oriented to person, place, and time. She appears well-developed and well-nourished. No distress. HENT:   Head: Normocephalic and atraumatic.    Right Ear: External ear normal.   Left Ear: External ear normal.   Nose: Nose normal. Mouth/Throat: Oropharynx is clear and moist. No oropharyngeal exudate. Eyes: Pupils are equal, round, and reactive to light. Conjunctivae and EOM are normal. Right eye exhibits no discharge. Left eye exhibits no discharge. No scleral icterus. Neck: Normal range of motion. Neck supple. No JVD present. No tracheal deviation present. No thyromegaly present. Cardiovascular: Normal rate, regular rhythm and normal heart sounds. Pulmonary/Chest: Effort normal and breath sounds normal. No stridor. No respiratory distress. She has no wheezes. She has no rales. She exhibits no tenderness. Abdominal: Soft. Bowel sounds are normal. She exhibits no distension and no mass. There is no tenderness. There is no rebound and no guarding. Musculoskeletal: Normal range of motion. She exhibits no edema, tenderness or deformity. Lymphadenopathy:     She has no cervical adenopathy. Neurological: She is alert and oriented to person, place, and time. Skin: Skin is warm and dry. No rash noted. She is not diaphoretic. No erythema. Psychiatric: She has a normal mood and affect. Her behavior is normal. Thought content normal.         Assessment:       Diagnosis Orders   1. Arthralgia, unspecified joint     2. Multiple myeloma, remission status unspecified ([address]     3. Chronic obstructive pulmonary disease, unspecified COPD type ([address]     4. SOB (shortness of breath)            Plan:    SOB  - Continue COPD Directed therapies   - Call for increased SOB, cough, sputum production, excessive fatigue, fever, chills, or myalgias  - Gentle exercise as tolerated  -  Rinse out mouth after inhaler use. - COPD ER PACK in place: Call prior to initializing    Pain  - Return to taking Gabapentin 300 mg po tid  - Acetaminophen 650 mg po every 8 hours prn Pain, keep total 24 hour dosage under 3000 mg po daily  - Do not take Oral NSAIDS due to renal insufficiency.   - Contine current POC  - Heat or ice to painful areas, whichever is preferred  - Gentle ROM exercises  - Pt is tolerating current pain meds without adverse effects or over sedation. Lowest effective dose used. - Encouraged her to discuss her discomfort with pain mangement provider.  - Risk vs benefit assessed. Pt educated on risk of addiction. Orders Placed This Encounter   Medications    levofloxacin (LEVAQUIN) 500 MG tablet     Sig: Take 1 tablet by mouth daily for 10 days     Dispense:  10 tablet     Refill:  0    gabapentin (NEURONTIN) 300 MG capsule     Sig: Take 1 capsule by mouth 3 times daily for 180 days. Intended supply: 90 days     Dispense:  270 capsule     Refill:  1       No follow-ups on file.     Nithya Palafox, APRN - CNP

## 2019-08-21 NOTE — TELEPHONE ENCOUNTER
Called Ryan with no answer. Left a VM to call the office to give an update on patient condition after receiving antibiotic and prednisone taper.

## 2019-08-23 NOTE — TELEPHONE ENCOUNTER
Patient also due for hemoglobin A1c and possibly other labs.   Please pend an order for hemoglobin A1c with diabetes as a diagnosis and I will add to it

## 2019-09-06 NOTE — TELEPHONE ENCOUNTER
Received phone call from Pt's  Kain Celis. He reported that his wife was recently treated for pneumonia. She completed therapy about four days ago. Today she started coughing again and it sounds the same way that it did before. Pt's chart reviewed. Order sent to [facility] for Levaquin x 10 days along with a Prednisone taper. Kain Celis also reported that Pt will be following [facility] for her pain management. She only has enough pain patch to last her until 9/12/19 and his appointment with [facility] is on 9/17/19.

## 2019-09-09 NOTE — PROGRESS NOTES
Provider. Medications Discontinued During This Encounter   Medication Reason    buprenorphine 20 MCG/HR PTWK LIST CLEANUP       Discussed with patient/surrogate: POC, Treatment risks/benefits, and alternatives including as noted above. All questions were answered. Gave much active listening, presence, and emotional support. Due to acuity, symptomatology and high-risk medication management,   I advised patient to Return in about 1 week (around 9/16/2019), or if symptoms worsen or fail to improve, for Symptom management. Total Time 30 mins   > 50% Time Spent Counseling/Care coordination?  yes     TALI Townsend - CNP    Collaborating physician: Dr. Ramos Moody

## 2019-09-09 NOTE — TELEPHONE ENCOUNTER
Attempted to call Jesse and Concha Romero to check on her condition, no answer.  LM that I will see them at their home tomorrow at 2 pm.

## 2019-09-11 NOTE — TELEPHONE ENCOUNTER
Patient's  Laura Shelby called to report that Sarah Chaney has been having intermittent diarrhea, but today has been \"worse than ever. \" He has given Smith Starch 4 doses of immodium but it has not made a difference.  He would like a call back with direction [phone]

## 2019-09-16 NOTE — PROGRESS NOTES
Subjective:      Patient ID: Meaghan Benjamin is a 80 y.o. female who presents for:  Chief Complaint   Patient presents with    3 Month Follow-Up     pt has walking pneu-        Walking pneumonia. Pt is coughing less. Nonproductive. No fever. He was diagnosed with this by the visiting home nurse practitioner from palliative care. Started on steroids and antibiotics. She is due to ends antibiotics tomorrow. And steroids soon. She is feeling a little bit stronger. She is been laying in bed a lot. Current Outpatient Medications on File Prior to Visit   Medication Sig Dispense Refill    diphenoxylate-atropine (LOMOTIL) 2.5-0.025 MG per tablet Take 1 tablet by mouth 4 times daily as needed for Diarrhea for up to 10 days. 40 tablet 0    buprenorphine 20 MCG/HR PTWK Place 1 patch onto the skin every 7 days for 30 days. 4 patch 0    Continuous Blood Gluc Sensor (FREESTYLE DERRELL 14 DAY SENSOR) MISC TEST 4 TIMES DAILY  CHANGE EVERY 14 DAYS  2    lenalidomide (REVLIMID) 25 MG chemo capsule Take 25 mg by mouth      oxyCODONE 5 MG capsule Take 5 mg by mouth.  predniSONE (DELTASONE) 10 MG tablet Take 1 tablet by mouth daily for 12 days Take 4 tabs x 3 days then 3 tabs x 3 days then 2 tabs x 3 days then 1 tab x 3 days. 30 tablet 0    levofloxacin (LEVAQUIN) 500 MG tablet Take 1 tablet by mouth daily for 7 days 7 tablet 0    metFORMIN (GLUCOPHAGE-XR) 750 MG extended release tablet TAKE ONE TABLET BY MOUTH TWO TIMES A DAY 60 tablet 4    esomeprazole (NEXIUM) 40 MG delayed release capsule TAKE ONE CAPSULE BY MOUTH EVERY MORNING BEFORE BREAKFAST 90 capsule 3    gabapentin (NEURONTIN) 300 MG capsule Take 1 capsule by mouth 3 times daily for 180 days.  Intended supply: 90 days 270 capsule 1    furosemide (LASIX) 20 MG tablet TAKE 2 TABLETS BY MOUTH EVERY MORNING AND TAKE 1 ADDITIONAL TABLET IN THE AFTERNOON IF NEEDED 180 tablet 0    primidone (MYSOLINE) 50 MG tablet TAKE 1 TABLET NIGHTLY 90 tablet 3   

## 2019-09-17 NOTE — PROGRESS NOTES
Subjective:      Patient Id:  Mirian Saunders is a 80 y.o. female who presents today with   Chief Complaint   Patient presents with    Diarrhea    Pain          HPI Seen at home for symptom management follow up rt CPD and multiple myeloma, demeanor calm and relaxed, NAD, no sedation. Seen in the home setting due to disease related symptoms and debility create heavy physical and financial burden to get to a clinic setting. Saulo Blanchard has restarted revlimid due to cancer progression, renal function is wnl at this time. She is recovering from community based PNA, feels better. She had stopped using her duonebs as she felt she did not need them any longer. SOB and edema at baseline. Negative excessive fatigue, fever, chills, myalgias, increased sputum production or cough, nausea, vomiting, chest pain, or orthopnea    Chronic back and shoulder pain from OA and myeloma not controlled with current POC, she is using her BTP medication of oxycodone routinely and still has severe pain when she moves or ambulates. No dysphagia, dysgeusia, or mouth sores; weight stable, Appetite is good. Positive intermittent diarrhea since starting Revlimid, lomotil controls it much better than imodium. No natividad blood in stool or urine. SOB and edema at baseline. Negative significant Sleep disturbance, depression, anxiety, or agitation episodes; denies suicidal or homicidal ideation or signs suggesting existential grief or spiritual pain.    Request refill of nystatin powder  Past Medical History:   Diagnosis Date    [facility]fib [facility]     Actinic keratoses     Allergic rhinitis, cause unspecified 4/12/2012    Anxiety 4/12/2012    Arthritis of right shoulder region     Asthma     [facility] esophagitis ([facility])     GI-Dr. Lo Baltazar    Chemotherapy-induced neuropathy ([facility])     Chronic pain syndrome     CKD (chronic kidney disease) stage 3, GFR 30-59 ml/min ([facility]) 9/29/2014    COPD (chronic obstructive pulmonary disease) ([facility])     Depression     Diabetes ([facility] 2017    Diaphoresis 4/15/2016    Dyslipidemia     unable to tolerate statins/tricor    Dysphagia     Fatigue     FH: CAD (coronary artery disease) 2012    GERD (gastroesophageal reflux disease)     History of DVT of lower extremity     HTN (hypertension)     [facility] 10/16/2015    Insomnia 2012    Intertrigo     Labral tear of shoulder     right    Lipoma     Low back pain 2015    Lower extremity edema 2012    Multiple myeloma (HCC)     Neuropathy     hands and feet    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 Iraida Diego in [facility] SOB (shortness of breath) 4/15/2016    Statin intolerance     Tendinopathy of right shoulder     Tremor 2012    Warfarin anticoagulation      Past Surgical History:   Procedure Laterality Date     SECTION      FOOT SURGERY      HEMORRHOID SURGERY      Dr. Misha Balderas Left 2013    ear basal cell    NERVE BLOCK Right 2016    [facility] JOY PALACIOS MD  SUPRASCAPULAR NB PULSED RF    OTHER SURGICAL HISTORY  14    [facility] NERVE BLOCK MEDIAN [facility]ANCH    VENA CAVA FILTER PLACEMENT       Social History     Socioeconomic History    Marital status:      Spouse name: Not on file    Number of children: Not on file    Years of education: Not on file    Highest education level: Not on file   Occupational History    Not on file   Social Needs    Financial resource strain: Not on file    Food insecurity:     Worry: Not on file     Inability: Not on file    Transportation needs:     Medical: Not on file     Non-medical: Not on file   Tobacco Use    Smoking status: Former Smoker     Years: 43.00     Types: Cigarettes     Last attempt to quit: 1992     Years since quittin.7    Smokeless tobacco: Never Used   Substance and Sexual facial asymmetry, speech difficulty, weakness, light-headedness, numbness and headaches. Hematological: Negative for adenopathy. Does not bruise/bleed easily. Psychiatric/Behavioral: Negative for agitation, behavioral problems, confusion, decreased concentration, dysphoric mood, hallucinations, self-injury, sleep disturbance and suicidal ideas. The patient is not nervous/anxious and is not hyperactive. Objective:   /67   Pulse 66   Temp 98.3 °F (36.8 °C)   Resp 16   LMP  (LMP Unknown)   SpO2 99%     Physical Exam   Constitutional: She is oriented to person, place, and time. She appears well-developed and well-nourished. No distress. HENT:   Head: Normocephalic and atraumatic. Right Ear: External ear normal.   Left Ear: External ear normal.   Nose: Nose normal.   Mouth/Throat: Oropharynx is clear and moist. No oropharyngeal exudate. Eyes: Pupils are equal, round, and reactive to light. Conjunctivae and EOM are normal. Right eye exhibits no discharge. Left eye exhibits no discharge. No scleral icterus. Neck: Normal range of motion. Neck supple. No JVD present. No tracheal deviation present. No thyromegaly present. Cardiovascular: Normal rate, regular rhythm and normal heart sounds. Pulmonary/Chest: Effort normal. No stridor. No respiratory distress. She has decreased breath sounds. She has no wheezes. She has rhonchi in the right middle field, the right lower field, the left middle field and the left lower field. She has rales. She exhibits no tenderness. Abdominal: Soft. Bowel sounds are normal. She exhibits no distension and no mass. There is no tenderness. There is no rebound and no guarding. Musculoskeletal: Normal range of motion. She exhibits no edema, tenderness or deformity. Lymphadenopathy:     She has no cervical adenopathy. Neurological: She is alert and oriented to person, place, and time. Skin: Skin is warm and dry. No rash noted. She is not diaphoretic.  No participate in ADLs  OARRS reviewed. There is no indication of aberrant behavior  Pt advised to call for increasing or uncontrolled pain  Risk vs benefit assessed. Pt educated on risk of addiction. Pt advised to take only as prescribed and not to change frequency of pain meds without consulting palliative care first.    Orders Placed This Encounter   Medications    levofloxacin (LEVAQUIN) 500 MG tablet     Sig: Take 1 tablet by mouth daily for 10 days     Dispense:  10 tablet     Refill:  0     COPD ER PACK    predniSONE (DELTASONE) 10 MG tablet     Sig: Take 4 tabs x 3 days, Take 3 tabs x 3 days, Take 2 tabs x 3 days, Take 1 tab x 3 days     Dispense:  30 tablet     Refill:  0     COPD ER PACK    DISCONTD: HYDROmorphone (DILAUDID) 2 MG tablet     Sig: Take 1 tablet by mouth every 8 hours as needed for Pain for up to 30 days. Dispense:  90 tablet     Refill:  0     Reduce doses taken as pain becomes manageable    nystatin (MYCOSTATIN) 118827 UNIT/GM powder     Sig: Apply 3 times daily. Dispense:  60 g     Refill:  5    ipratropium-albuterol (DUONEB) 0.5-2.5 (3) MG/3ML SOLN nebulizer solution     Sig: Inhale 3 mLs into the lungs every 4 hours     Dispense:  540 mL     Refill:  0    HYDROmorphone (DILAUDID) 2 MG tablet     Sig: Take 1 tablet by mouth every 8 hours as needed for Pain for up to 30 days. Dispense:  90 tablet     Refill:  0     Reduce doses taken as pain becomes manageable       No follow-ups on file.     Boyd Caruso, APRN - CNP

## 2019-09-20 NOTE — TELEPHONE ENCOUNTER
Rx requested:  Requested Prescriptions     Pending Prescriptions Disp Refills    diclofenac sodium 1 % GEL 1 Tube        Last Office Visit:   9/17/2019      Last filled:  12/10/2019    Next Visit Date:  Future Appointments   Date Time Provider [place]   10/1/2019  1:00 PM Lizette Leach, APRN - [facility]   12/16/2019 12:30 PM Ronnie Yoder MD [facility]     Pt also questioned the dilaudid Rx [facility] didn't have it and it was to be sent to [facility] and the pt states that [facility] doesn't have the Rx, and in epic it only looks like it went to [facility].

## 2019-09-26 NOTE — TELEPHONE ENCOUNTER
Ed tells me that Virginia Estrada feels much better since starting hydromorphone, \" we both got the best sleep we had in years, as usually we are both up all night with her pain\". He tells me he stopped giving her gabapentin and clonazepam as he does not feel she needs it as she feels so much better. He stopped in cold turkey, \"she is fine\". I agree if she feels good, discharge both medications.

## 2019-10-15 PROBLEM — J18.9 PNEUMONIA: Status: ACTIVE | Noted: 2019-01-01

## 2020-01-01 ENCOUNTER — TELEPHONE (OUTPATIENT)
Dept: FAMILY MEDICINE CLINIC | Age: 85
End: 2020-01-01

## 2020-01-01 ENCOUNTER — OFFICE VISIT (OUTPATIENT)
Dept: PALLATIVE CARE | Age: 85
End: 2020-01-01
Payer: MEDICARE

## 2020-01-01 ENCOUNTER — APPOINTMENT (OUTPATIENT)
Dept: GENERAL RADIOLOGY | Age: 85
End: 2020-01-01
Payer: MEDICARE

## 2020-01-01 ENCOUNTER — TELEPHONE (OUTPATIENT)
Dept: PALLATIVE CARE | Age: 85
End: 2020-01-01

## 2020-01-01 ENCOUNTER — HOSPITAL ENCOUNTER (OUTPATIENT)
Age: 85
Setting detail: OBSERVATION
Discharge: HOME | End: 2020-02-18
Attending: FAMILY MEDICINE | Admitting: INTERNAL MEDICINE
Payer: MEDICARE

## 2020-01-01 ENCOUNTER — APPOINTMENT (OUTPATIENT)
Dept: CT IMAGING | Age: 85
End: 2020-01-01
Payer: MEDICARE

## 2020-01-01 VITALS
RESPIRATION RATE: 20 BRPM | BODY MASS INDEX: 28.99 KG/M2 | TEMPERATURE: 97.7 F | SYSTOLIC BLOOD PRESSURE: 159 MMHG | DIASTOLIC BLOOD PRESSURE: 95 MMHG | OXYGEN SATURATION: 100 % | HEIGHT: 65 IN | HEART RATE: 79 BPM | WEIGHT: 174 LBS

## 2020-01-01 VITALS
OXYGEN SATURATION: 96 % | DIASTOLIC BLOOD PRESSURE: 56 MMHG | SYSTOLIC BLOOD PRESSURE: 110 MMHG | RESPIRATION RATE: 16 BRPM | HEART RATE: 70 BPM

## 2020-01-01 LAB
ALBUMIN SERPL-MCNC: 3.3 G/DL (ref 3.5–4.6)
ALBUMIN SERPL-MCNC: 3.7 G/DL (ref 3.5–4.6)
ALP BLD-CCNC: 86 U/L (ref 40–130)
ALP BLD-CCNC: 92 U/L (ref 40–130)
ALT SERPL-CCNC: 6 U/L (ref 0–33)
ALT SERPL-CCNC: 8 U/L (ref 0–33)
AMYLASE: 26 U/L (ref 22–93)
ANION GAP SERPL CALCULATED.3IONS-SCNC: 12 MEQ/L (ref 9–15)
ANION GAP SERPL CALCULATED.3IONS-SCNC: 12 MEQ/L (ref 9–15)
ANION GAP SERPL CALCULATED.3IONS-SCNC: 13 MEQ/L (ref 9–15)
ANION GAP SERPL CALCULATED.3IONS-SCNC: 15 MEQ/L (ref 9–15)
AST SERPL-CCNC: 14 U/L (ref 0–35)
AST SERPL-CCNC: 17 U/L (ref 0–35)
BACTERIA: NEGATIVE /HPF
BASOPHILS ABSOLUTE: 0.2 K/UL (ref 0–0.2)
BASOPHILS ABSOLUTE: 0.3 K/UL (ref 0–0.2)
BASOPHILS RELATIVE PERCENT: 2.2 %
BASOPHILS RELATIVE PERCENT: 3.7 %
BILIRUB SERPL-MCNC: 0.7 MG/DL (ref 0.2–0.7)
BILIRUB SERPL-MCNC: 0.8 MG/DL (ref 0.2–0.7)
BILIRUBIN URINE: NEGATIVE
BLOOD, URINE: NEGATIVE
BUN BLDV-MCNC: 6 MG/DL (ref 8–23)
BUN BLDV-MCNC: 8 MG/DL (ref 8–23)
BUN BLDV-MCNC: 8 MG/DL (ref 8–23)
BUN BLDV-MCNC: 9 MG/DL (ref 8–23)
CALCIUM SERPL-MCNC: 9.1 MG/DL (ref 8.5–9.9)
CALCIUM SERPL-MCNC: 9.2 MG/DL (ref 8.5–9.9)
CALCIUM SERPL-MCNC: 9.5 MG/DL (ref 8.5–9.9)
CALCIUM SERPL-MCNC: 9.6 MG/DL (ref 8.5–9.9)
CHLORIDE BLD-SCNC: 102 MEQ/L (ref 95–107)
CHLORIDE BLD-SCNC: 103 MEQ/L (ref 95–107)
CHLORIDE BLD-SCNC: 104 MEQ/L (ref 95–107)
CHLORIDE BLD-SCNC: 97 MEQ/L (ref 95–107)
CLARITY: CLEAR
CO2: 22 MEQ/L (ref 20–31)
CO2: 24 MEQ/L (ref 20–31)
CO2: 25 MEQ/L (ref 20–31)
CO2: 25 MEQ/L (ref 20–31)
COLOR: YELLOW
CREAT SERPL-MCNC: 0.5 MG/DL (ref 0.5–0.9)
CREAT SERPL-MCNC: 0.54 MG/DL (ref 0.5–0.9)
CREAT SERPL-MCNC: 0.62 MG/DL (ref 0.5–0.9)
CREAT SERPL-MCNC: 0.64 MG/DL (ref 0.5–0.9)
EOSINOPHILS ABSOLUTE: 0 K/UL (ref 0–0.7)
EOSINOPHILS ABSOLUTE: 0 K/UL (ref 0–0.7)
EOSINOPHILS RELATIVE PERCENT: 0.2 %
EOSINOPHILS RELATIVE PERCENT: 0.3 %
EPITHELIAL CELLS, UA: NORMAL /HPF (ref 0–5)
GFR AFRICAN AMERICAN: >60
GFR NON-AFRICAN AMERICAN: >60
GLOBULIN: 3.3 G/DL (ref 2.3–3.5)
GLOBULIN: 3.4 G/DL (ref 2.3–3.5)
GLUCOSE BLD-MCNC: 103 MG/DL (ref 60–115)
GLUCOSE BLD-MCNC: 106 MG/DL (ref 60–115)
GLUCOSE BLD-MCNC: 107 MG/DL (ref 60–115)
GLUCOSE BLD-MCNC: 109 MG/DL (ref 70–99)
GLUCOSE BLD-MCNC: 113 MG/DL (ref 60–115)
GLUCOSE BLD-MCNC: 114 MG/DL (ref 60–115)
GLUCOSE BLD-MCNC: 118 MG/DL (ref 60–115)
GLUCOSE BLD-MCNC: 121 MG/DL (ref 70–99)
GLUCOSE BLD-MCNC: 124 MG/DL (ref 60–115)
GLUCOSE BLD-MCNC: 125 MG/DL (ref 60–115)
GLUCOSE BLD-MCNC: 125 MG/DL (ref 70–99)
GLUCOSE BLD-MCNC: 127 MG/DL (ref 60–115)
GLUCOSE BLD-MCNC: 134 MG/DL (ref 70–99)
GLUCOSE BLD-MCNC: 139 MG/DL (ref 60–115)
GLUCOSE BLD-MCNC: 141 MG/DL (ref 60–115)
GLUCOSE BLD-MCNC: 173 MG/DL (ref 60–115)
GLUCOSE URINE: NEGATIVE MG/DL
HCT VFR BLD CALC: 42.8 % (ref 37–47)
HCT VFR BLD CALC: 44.1 % (ref 37–47)
HCT VFR BLD CALC: 46.1 % (ref 37–47)
HCT VFR BLD CALC: 46.4 % (ref 37–47)
HEMOGLOBIN: 14 G/DL (ref 12–16)
HEMOGLOBIN: 14.1 G/DL (ref 12–16)
HEMOGLOBIN: 14.5 G/DL (ref 12–16)
HEMOGLOBIN: 14.9 G/DL (ref 12–16)
HYALINE CASTS: NORMAL /HPF (ref 0–5)
INFLUENZA A BY PCR: NEGATIVE
INFLUENZA B BY PCR: NEGATIVE
INR BLD: 1.2
KETONES, URINE: ABNORMAL MG/DL
LACTIC ACID, SEPSIS: 1.5 MMOL/L (ref 0.5–1.9)
LEUKOCYTE ESTERASE, URINE: NEGATIVE
LYMPHOCYTES ABSOLUTE: 1.5 K/UL (ref 1–4.8)
LYMPHOCYTES ABSOLUTE: 1.7 K/UL (ref 1–4.8)
LYMPHOCYTES RELATIVE PERCENT: 21.6 %
LYMPHOCYTES RELATIVE PERCENT: 23.4 %
MCH RBC QN AUTO: 26.7 PG (ref 27–31.3)
MCH RBC QN AUTO: 26.8 PG (ref 27–31.3)
MCH RBC QN AUTO: 27.2 PG (ref 27–31.3)
MCH RBC QN AUTO: 27.4 PG (ref 27–31.3)
MCHC RBC AUTO-ENTMCNC: 31.3 % (ref 33–37)
MCHC RBC AUTO-ENTMCNC: 32 % (ref 33–37)
MCHC RBC AUTO-ENTMCNC: 32.3 % (ref 33–37)
MCHC RBC AUTO-ENTMCNC: 32.6 % (ref 33–37)
MCV RBC AUTO: 83.7 FL (ref 82–100)
MCV RBC AUTO: 83.8 FL (ref 82–100)
MCV RBC AUTO: 84.1 FL (ref 82–100)
MCV RBC AUTO: 85.2 FL (ref 82–100)
MONOCYTES ABSOLUTE: 0.8 K/UL (ref 0.2–0.8)
MONOCYTES ABSOLUTE: 0.9 K/UL (ref 0.2–0.8)
MONOCYTES RELATIVE PERCENT: 11 %
MONOCYTES RELATIVE PERCENT: 13.7 %
NEUTROPHILS ABSOLUTE: 4.3 K/UL (ref 1.4–6.5)
NEUTROPHILS ABSOLUTE: 4.4 K/UL (ref 1.4–6.5)
NEUTROPHILS RELATIVE PERCENT: 61.7 %
NEUTROPHILS RELATIVE PERCENT: 62.2 %
NITRITE, URINE: NEGATIVE
PDW BLD-RTO: 18.2 % (ref 11.5–14.5)
PDW BLD-RTO: 18.3 % (ref 11.5–14.5)
PDW BLD-RTO: 18.3 % (ref 11.5–14.5)
PDW BLD-RTO: 18.4 % (ref 11.5–14.5)
PERFORMED ON: ABNORMAL
PERFORMED ON: NORMAL
PH UA: 5.5 (ref 5–9)
PLATELET # BLD: 271 K/UL (ref 130–400)
PLATELET # BLD: 284 K/UL (ref 130–400)
PLATELET # BLD: 296 K/UL (ref 130–400)
PLATELET # BLD: 300 K/UL (ref 130–400)
POTASSIUM REFLEX MAGNESIUM: 4 MEQ/L (ref 3.4–4.9)
POTASSIUM REFLEX MAGNESIUM: 4.5 MEQ/L (ref 3.4–4.9)
POTASSIUM SERPL-SCNC: 3.3 MEQ/L (ref 3.4–4.9)
POTASSIUM SERPL-SCNC: 3.5 MEQ/L (ref 3.4–4.9)
PROTEIN UA: 100 MG/DL
PROTHROMBIN TIME: 15.4 SEC (ref 12.3–14.9)
RBC # BLD: 5.1 M/UL (ref 4.2–5.4)
RBC # BLD: 5.26 M/UL (ref 4.2–5.4)
RBC # BLD: 5.45 M/UL (ref 4.2–5.4)
RBC # BLD: 5.48 M/UL (ref 4.2–5.4)
RBC UA: NORMAL /HPF (ref 0–2)
SODIUM BLD-SCNC: 133 MEQ/L (ref 135–144)
SODIUM BLD-SCNC: 139 MEQ/L (ref 135–144)
SODIUM BLD-SCNC: 141 MEQ/L (ref 135–144)
SODIUM BLD-SCNC: 141 MEQ/L (ref 135–144)
SPECIFIC GRAVITY UA: 1.02 (ref 1–1.03)
TOTAL PROTEIN: 6.7 G/DL (ref 6.3–8)
TOTAL PROTEIN: 7 G/DL (ref 6.3–8)
URINE CULTURE, ROUTINE: NORMAL
URINE REFLEX TO CULTURE: YES
UROBILINOGEN, URINE: 1 E.U./DL
WBC # BLD: 6.9 K/UL (ref 4.8–10.8)
WBC # BLD: 7.1 K/UL (ref 4.8–10.8)
WBC # BLD: 7.1 K/UL (ref 4.8–10.8)
WBC # BLD: 9 K/UL (ref 4.8–10.8)
WBC UA: NORMAL /HPF (ref 0–5)

## 2020-01-01 PROCEDURE — 6370000000 HC RX 637 (ALT 250 FOR IP): Performed by: NURSE PRACTITIONER

## 2020-01-01 PROCEDURE — 96365 THER/PROPH/DIAG IV INF INIT: CPT

## 2020-01-01 PROCEDURE — 80048 BASIC METABOLIC PNL TOTAL CA: CPT

## 2020-01-01 PROCEDURE — 6370000000 HC RX 637 (ALT 250 FOR IP): Performed by: INTERNAL MEDICINE

## 2020-01-01 PROCEDURE — 81001 URINALYSIS AUTO W/SCOPE: CPT

## 2020-01-01 PROCEDURE — 99285 EMERGENCY DEPT VISIT HI MDM: CPT

## 2020-01-01 PROCEDURE — 2580000003 HC RX 258: Performed by: FAMILY MEDICINE

## 2020-01-01 PROCEDURE — 87086 URINE CULTURE/COLONY COUNT: CPT

## 2020-01-01 PROCEDURE — 87502 INFLUENZA DNA AMP PROBE: CPT

## 2020-01-01 PROCEDURE — 99349 HOME/RES VST EST MOD MDM 40: CPT | Performed by: NURSE PRACTITIONER

## 2020-01-01 PROCEDURE — 80053 COMPREHEN METABOLIC PANEL: CPT

## 2020-01-01 PROCEDURE — 2580000003 HC RX 258: Performed by: NURSE PRACTITIONER

## 2020-01-01 PROCEDURE — 96375 TX/PRO/DX INJ NEW DRUG ADDON: CPT

## 2020-01-01 PROCEDURE — 70450 CT HEAD/BRAIN W/O DYE: CPT

## 2020-01-01 PROCEDURE — 2700000000 HC OXYGEN THERAPY PER DAY

## 2020-01-01 PROCEDURE — G0378 HOSPITAL OBSERVATION PER HR: HCPCS

## 2020-01-01 PROCEDURE — 96361 HYDRATE IV INFUSION ADD-ON: CPT

## 2020-01-01 PROCEDURE — 99350 HOME/RES VST EST HIGH MDM 60: CPT | Performed by: NURSE PRACTITIONER

## 2020-01-01 PROCEDURE — 36415 COLL VENOUS BLD VENIPUNCTURE: CPT

## 2020-01-01 PROCEDURE — 96360 HYDRATION IV INFUSION INIT: CPT

## 2020-01-01 PROCEDURE — 73030 X-RAY EXAM OF SHOULDER: CPT

## 2020-01-01 PROCEDURE — 85027 COMPLETE CBC AUTOMATED: CPT

## 2020-01-01 PROCEDURE — 6360000002 HC RX W HCPCS: Performed by: FAMILY MEDICINE

## 2020-01-01 PROCEDURE — 85610 PROTHROMBIN TIME: CPT

## 2020-01-01 PROCEDURE — 97535 SELF CARE MNGMENT TRAINING: CPT

## 2020-01-01 PROCEDURE — 85025 COMPLETE CBC W/AUTO DIFF WBC: CPT

## 2020-01-01 PROCEDURE — 97166 OT EVAL MOD COMPLEX 45 MIN: CPT

## 2020-01-01 PROCEDURE — 96372 THER/PROPH/DIAG INJ SC/IM: CPT

## 2020-01-01 PROCEDURE — 83605 ASSAY OF LACTIC ACID: CPT

## 2020-01-01 PROCEDURE — 6360000002 HC RX W HCPCS: Performed by: INTERNAL MEDICINE

## 2020-01-01 PROCEDURE — 6360000002 HC RX W HCPCS: Performed by: NURSE PRACTITIONER

## 2020-01-01 PROCEDURE — 82150 ASSAY OF AMYLASE: CPT

## 2020-01-01 PROCEDURE — 97162 PT EVAL MOD COMPLEX 30 MIN: CPT

## 2020-01-01 RX ORDER — HALOPERIDOL 5 MG/ML
0.5 INJECTION INTRAMUSCULAR EVERY 30 MIN PRN
Status: DISPENSED | OUTPATIENT
Start: 2020-01-01 | End: 2020-01-01

## 2020-01-01 RX ORDER — LEVOFLOXACIN 500 MG/1
500 TABLET, FILM COATED ORAL DAILY
Qty: 10 TABLET | Refills: 0 | Status: SHIPPED | OUTPATIENT
Start: 2020-01-01 | End: 2020-01-01

## 2020-01-01 RX ORDER — 0.9 % SODIUM CHLORIDE 0.9 %
1000 INTRAVENOUS SOLUTION INTRAVENOUS ONCE
Status: COMPLETED | OUTPATIENT
Start: 2020-01-01 | End: 2020-01-01

## 2020-01-01 RX ORDER — LIDOCAINE 4 G/G
1 PATCH TOPICAL DAILY
Status: COMPLETED | OUTPATIENT
Start: 2020-01-01 | End: 2020-01-01

## 2020-01-01 RX ORDER — ACETAMINOPHEN 80 MG
TABLET,CHEWABLE ORAL ONCE
Status: COMPLETED | OUTPATIENT
Start: 2020-01-01 | End: 2020-01-01

## 2020-01-01 RX ORDER — FUROSEMIDE 40 MG/1
40 TABLET ORAL DAILY
Status: DISCONTINUED | OUTPATIENT
Start: 2020-01-01 | End: 2020-01-01 | Stop reason: HOSPADM

## 2020-01-01 RX ORDER — DEXTROSE MONOHYDRATE 50 MG/ML
100 INJECTION, SOLUTION INTRAVENOUS PRN
Status: DISCONTINUED | OUTPATIENT
Start: 2020-01-01 | End: 2020-01-01 | Stop reason: HOSPADM

## 2020-01-01 RX ORDER — FENTANYL 50 UG/H
1 PATCH TRANSDERMAL
Qty: 10 PATCH | Refills: 0 | Status: SHIPPED | OUTPATIENT
Start: 2020-01-01 | End: 2020-01-01 | Stop reason: SDUPTHER

## 2020-01-01 RX ORDER — UREA 10 %
2 LOTION (ML) TOPICAL NIGHTLY
Status: DISCONTINUED | OUTPATIENT
Start: 2020-01-01 | End: 2020-01-01 | Stop reason: HOSPADM

## 2020-01-01 RX ORDER — PRIMIDONE 50 MG/1
50 TABLET ORAL NIGHTLY
Status: DISCONTINUED | OUTPATIENT
Start: 2020-01-01 | End: 2020-01-01 | Stop reason: HOSPADM

## 2020-01-01 RX ORDER — BUDESONIDE AND FORMOTEROL FUMARATE DIHYDRATE 160; 4.5 UG/1; UG/1
AEROSOL RESPIRATORY (INHALATION)
Qty: 30.6 G | Refills: 4 | Status: SHIPPED | OUTPATIENT
Start: 2020-01-01

## 2020-01-01 RX ORDER — DILTIAZEM HYDROCHLORIDE 180 MG/1
360 CAPSULE, COATED, EXTENDED RELEASE ORAL DAILY
Status: DISCONTINUED | OUTPATIENT
Start: 2020-01-01 | End: 2020-01-01 | Stop reason: HOSPADM

## 2020-01-01 RX ORDER — FENTANYL 50 UG/H
1 PATCH TRANSDERMAL
Qty: 10 PATCH | Refills: 0 | Status: SHIPPED | OUTPATIENT
Start: 2020-01-01 | End: 2020-01-01

## 2020-01-01 RX ORDER — LENALIDOMIDE 25 MG/1
25 CAPSULE ORAL DAILY
Status: DISCONTINUED | OUTPATIENT
Start: 2020-01-01 | End: 2020-01-01 | Stop reason: HOSPADM

## 2020-01-01 RX ORDER — FLUTICASONE PROPIONATE 50 MCG
1 SPRAY, SUSPENSION (ML) NASAL DAILY
Status: DISCONTINUED | OUTPATIENT
Start: 2020-01-01 | End: 2020-01-01 | Stop reason: HOSPADM

## 2020-01-01 RX ORDER — SENNA PLUS 8.6 MG/1
1 TABLET ORAL DAILY PRN
Status: DISCONTINUED | OUTPATIENT
Start: 2020-01-01 | End: 2020-01-01 | Stop reason: HOSPADM

## 2020-01-01 RX ORDER — SODIUM CHLORIDE 0.9 % (FLUSH) 0.9 %
10 SYRINGE (ML) INJECTION PRN
Status: DISCONTINUED | OUTPATIENT
Start: 2020-01-01 | End: 2020-01-01 | Stop reason: HOSPADM

## 2020-01-01 RX ORDER — MULTIVIT-MIN/IRON/FOLIC ACID/K 18-600-40
CAPSULE ORAL
COMMUNITY

## 2020-01-01 RX ORDER — GUAIFENESIN 600 MG/1
1200 TABLET, EXTENDED RELEASE ORAL 2 TIMES DAILY
Status: DISCONTINUED | OUTPATIENT
Start: 2020-01-01 | End: 2020-01-01 | Stop reason: HOSPADM

## 2020-01-01 RX ORDER — RIVAROXABAN 20 MG/1
TABLET, FILM COATED ORAL
Qty: 90 TABLET | Refills: 0 | Status: SHIPPED | OUTPATIENT
Start: 2020-01-01

## 2020-01-01 RX ORDER — ACETAMINOPHEN 500 MG
1000 TABLET ORAL EVERY 8 HOURS
Status: DISCONTINUED | OUTPATIENT
Start: 2020-01-01 | End: 2020-01-01 | Stop reason: HOSPADM

## 2020-01-01 RX ORDER — BUDESONIDE AND FORMOTEROL FUMARATE DIHYDRATE 160; 4.5 UG/1; UG/1
2 AEROSOL RESPIRATORY (INHALATION) 2 TIMES DAILY
Status: DISCONTINUED | OUTPATIENT
Start: 2020-01-01 | End: 2020-01-01 | Stop reason: HOSPADM

## 2020-01-01 RX ORDER — KETOROLAC TROMETHAMINE 15 MG/ML
15 INJECTION, SOLUTION INTRAMUSCULAR; INTRAVENOUS ONCE
Status: DISCONTINUED | OUTPATIENT
Start: 2020-01-01 | End: 2020-01-01

## 2020-01-01 RX ORDER — DILTIAZEM HYDROCHLORIDE 360 MG/1
CAPSULE, EXTENDED RELEASE ORAL
Qty: 90 CAPSULE | Refills: 4 | Status: SHIPPED | OUTPATIENT
Start: 2020-01-01

## 2020-01-01 RX ORDER — ONDANSETRON 2 MG/ML
4 INJECTION INTRAMUSCULAR; INTRAVENOUS EVERY 6 HOURS PRN
Status: DISCONTINUED | OUTPATIENT
Start: 2020-01-01 | End: 2020-01-01 | Stop reason: HOSPADM

## 2020-01-01 RX ORDER — POTASSIUM CHLORIDE 20 MEQ/1
20 TABLET, EXTENDED RELEASE ORAL
Status: DISCONTINUED | OUTPATIENT
Start: 2020-01-01 | End: 2020-01-01 | Stop reason: HOSPADM

## 2020-01-01 RX ORDER — DEXTROSE MONOHYDRATE 25 G/50ML
12.5 INJECTION, SOLUTION INTRAVENOUS PRN
Status: DISCONTINUED | OUTPATIENT
Start: 2020-01-01 | End: 2020-01-01 | Stop reason: HOSPADM

## 2020-01-01 RX ORDER — HYDROMORPHONE HYDROCHLORIDE 2 MG/1
2 TABLET ORAL EVERY 6 HOURS PRN
Qty: 120 TABLET | Refills: 0 | Status: SHIPPED | OUTPATIENT
Start: 2020-01-01 | End: 2020-01-01

## 2020-01-01 RX ORDER — NICOTINE POLACRILEX 4 MG
15 LOZENGE BUCCAL PRN
Status: DISCONTINUED | OUTPATIENT
Start: 2020-01-01 | End: 2020-01-01 | Stop reason: HOSPADM

## 2020-01-01 RX ORDER — CLARITHROMYCIN 500 MG/1
250 TABLET, COATED ORAL 2 TIMES DAILY
Status: DISCONTINUED | OUTPATIENT
Start: 2020-01-01 | End: 2020-01-01

## 2020-01-01 RX ORDER — HYDROMORPHONE HYDROCHLORIDE 2 MG/1
2 TABLET ORAL EVERY 6 HOURS PRN
Qty: 120 TABLET | Refills: 0 | Status: SHIPPED | OUTPATIENT
Start: 2020-01-01 | End: 2020-01-01 | Stop reason: SDUPTHER

## 2020-01-01 RX ORDER — METFORMIN HYDROCHLORIDE 750 MG/1
TABLET, EXTENDED RELEASE ORAL
Qty: 60 TABLET | Refills: 2 | Status: SHIPPED | OUTPATIENT
Start: 2020-01-01

## 2020-01-01 RX ORDER — LORAZEPAM 2 MG/ML
1 INJECTION INTRAMUSCULAR ONCE
Status: COMPLETED | OUTPATIENT
Start: 2020-01-01 | End: 2020-01-01

## 2020-01-01 RX ORDER — SODIUM CHLORIDE 0.9 % (FLUSH) 0.9 %
10 SYRINGE (ML) INJECTION EVERY 12 HOURS SCHEDULED
Status: DISCONTINUED | OUTPATIENT
Start: 2020-01-01 | End: 2020-01-01 | Stop reason: HOSPADM

## 2020-01-01 RX ORDER — PANTOPRAZOLE SODIUM 40 MG/1
40 TABLET, DELAYED RELEASE ORAL
Status: DISCONTINUED | OUTPATIENT
Start: 2020-01-01 | End: 2020-01-01 | Stop reason: HOSPADM

## 2020-01-01 RX ORDER — ACETAMINOPHEN 325 MG/1
650 TABLET ORAL EVERY 4 HOURS PRN
Status: DISCONTINUED | OUTPATIENT
Start: 2020-01-01 | End: 2020-01-01 | Stop reason: HOSPADM

## 2020-01-01 RX ADMIN — PRIMIDONE 50 MG: 50 TABLET ORAL at 21:20

## 2020-01-01 RX ADMIN — DILTIAZEM HYDROCHLORIDE 360 MG: 180 CAPSULE, COATED, EXTENDED RELEASE ORAL at 22:33

## 2020-01-01 RX ADMIN — FUROSEMIDE 40 MG: 40 TABLET ORAL at 09:51

## 2020-01-01 RX ADMIN — CLARITHROMYCIN 250 MG: 500 TABLET ORAL at 22:32

## 2020-01-01 RX ADMIN — RIVAROXABAN 20 MG: 20 TABLET, FILM COATED ORAL at 17:32

## 2020-01-01 RX ADMIN — Medication 10 ML: at 21:21

## 2020-01-01 RX ADMIN — CLARITHROMYCIN 250 MG: 500 TABLET ORAL at 08:18

## 2020-01-01 RX ADMIN — GUAIFENESIN 1200 MG: 600 TABLET, EXTENDED RELEASE ORAL at 09:41

## 2020-01-01 RX ADMIN — Medication 10 ML: at 23:02

## 2020-01-01 RX ADMIN — PRIMIDONE 50 MG: 50 TABLET ORAL at 23:17

## 2020-01-01 RX ADMIN — FUROSEMIDE 40 MG: 40 TABLET ORAL at 09:42

## 2020-01-01 RX ADMIN — METOPROLOL TARTRATE 25 MG: 25 TABLET, FILM COATED ORAL at 23:15

## 2020-01-01 RX ADMIN — ACETAMINOPHEN 1000 MG: 500 TABLET ORAL at 22:33

## 2020-01-01 RX ADMIN — METOPROLOL TARTRATE 25 MG: 25 TABLET, FILM COATED ORAL at 21:20

## 2020-01-01 RX ADMIN — DILTIAZEM HYDROCHLORIDE 360 MG: 180 CAPSULE, COATED, EXTENDED RELEASE ORAL at 09:42

## 2020-01-01 RX ADMIN — FLUTICASONE PROPIONATE 1 SPRAY: 50 SPRAY, METERED NASAL at 10:44

## 2020-01-01 RX ADMIN — Medication 2 MG: at 21:20

## 2020-01-01 RX ADMIN — SODIUM CHLORIDE 1000 ML: 9 INJECTION, SOLUTION INTRAVENOUS at 15:10

## 2020-01-01 RX ADMIN — POTASSIUM CHLORIDE 20 MEQ: 20 TABLET, EXTENDED RELEASE ORAL at 10:42

## 2020-01-01 RX ADMIN — METOPROLOL TARTRATE 25 MG: 25 TABLET, FILM COATED ORAL at 22:14

## 2020-01-01 RX ADMIN — LORAZEPAM 1 MG: 2 INJECTION INTRAMUSCULAR; INTRAVENOUS at 03:21

## 2020-01-01 RX ADMIN — CLARITHROMYCIN 250 MG: 500 TABLET ORAL at 23:17

## 2020-01-01 RX ADMIN — ACETAMINOPHEN 1000 MG: 500 TABLET ORAL at 15:25

## 2020-01-01 RX ADMIN — GUAIFENESIN 1200 MG: 600 TABLET, EXTENDED RELEASE ORAL at 22:14

## 2020-01-01 RX ADMIN — FLUTICASONE PROPIONATE 1 SPRAY: 50 SPRAY, METERED NASAL at 09:44

## 2020-01-01 RX ADMIN — FLUTICASONE PROPIONATE 1 SPRAY: 50 SPRAY, METERED NASAL at 08:22

## 2020-01-01 RX ADMIN — Medication: at 22:34

## 2020-01-01 RX ADMIN — CEFTRIAXONE SODIUM 1 G: 1 INJECTION, POWDER, FOR SOLUTION INTRAMUSCULAR; INTRAVENOUS at 17:00

## 2020-01-01 RX ADMIN — DILTIAZEM HYDROCHLORIDE 360 MG: 180 CAPSULE, COATED, EXTENDED RELEASE ORAL at 09:51

## 2020-01-01 RX ADMIN — POTASSIUM CHLORIDE 20 MEQ: 20 TABLET, EXTENDED RELEASE ORAL at 08:19

## 2020-01-01 RX ADMIN — NYSTATIN 500000 UNITS: 100000 SUSPENSION ORAL at 09:42

## 2020-01-01 RX ADMIN — NYSTATIN 500000 UNITS: 100000 SUSPENSION ORAL at 12:09

## 2020-01-01 RX ADMIN — PRIMIDONE 50 MG: 50 TABLET ORAL at 22:13

## 2020-01-01 RX ADMIN — NYSTATIN 500000 UNITS: 100000 SUSPENSION ORAL at 08:18

## 2020-01-01 RX ADMIN — ACETAMINOPHEN 1000 MG: 500 TABLET ORAL at 13:58

## 2020-01-01 RX ADMIN — Medication 10 ML: at 08:23

## 2020-01-01 RX ADMIN — RIVAROXABAN 20 MG: 20 TABLET, FILM COATED ORAL at 17:42

## 2020-01-01 RX ADMIN — PANTOPRAZOLE SODIUM 40 MG: 40 TABLET, DELAYED RELEASE ORAL at 09:41

## 2020-01-01 RX ADMIN — DILTIAZEM HYDROCHLORIDE 360 MG: 180 CAPSULE, COATED, EXTENDED RELEASE ORAL at 08:19

## 2020-01-01 RX ADMIN — METOPROLOL TARTRATE 25 MG: 25 TABLET, FILM COATED ORAL at 09:51

## 2020-01-01 RX ADMIN — HALOPERIDOL LACTATE 0.5 MG: 5 INJECTION INTRAMUSCULAR at 00:43

## 2020-01-01 RX ADMIN — ACETAMINOPHEN 1000 MG: 500 TABLET ORAL at 22:42

## 2020-01-01 RX ADMIN — ACETAMINOPHEN 1000 MG: 500 TABLET ORAL at 06:51

## 2020-01-01 RX ADMIN — PANTOPRAZOLE SODIUM 40 MG: 40 TABLET, DELAYED RELEASE ORAL at 06:51

## 2020-01-01 RX ADMIN — NYSTATIN 500000 UNITS: 100000 SUSPENSION ORAL at 21:20

## 2020-01-01 RX ADMIN — Medication 10 ML: at 22:15

## 2020-01-01 RX ADMIN — GUAIFENESIN 1200 MG: 600 TABLET, EXTENDED RELEASE ORAL at 21:20

## 2020-01-01 RX ADMIN — BUDESONIDE AND FORMOTEROL FUMARATE DIHYDRATE 2 PUFF: 160; 4.5 AEROSOL RESPIRATORY (INHALATION) at 11:26

## 2020-01-01 RX ADMIN — INSULIN LISPRO 1 UNITS: 100 INJECTION, SOLUTION INTRAVENOUS; SUBCUTANEOUS at 12:30

## 2020-01-01 RX ADMIN — CLARITHROMYCIN 250 MG: 500 TABLET ORAL at 09:41

## 2020-01-01 RX ADMIN — GUAIFENESIN 1200 MG: 600 TABLET, EXTENDED RELEASE ORAL at 23:15

## 2020-01-01 RX ADMIN — NYSTATIN 500000 UNITS: 100000 SUSPENSION ORAL at 23:00

## 2020-01-01 RX ADMIN — FUROSEMIDE 40 MG: 40 TABLET ORAL at 08:19

## 2020-01-01 RX ADMIN — ACETAMINOPHEN 1000 MG: 500 TABLET ORAL at 23:18

## 2020-01-01 RX ADMIN — METOPROLOL TARTRATE 25 MG: 25 TABLET, FILM COATED ORAL at 09:41

## 2020-01-01 RX ADMIN — NYSTATIN 500000 UNITS: 100000 SUSPENSION ORAL at 22:15

## 2020-01-01 RX ADMIN — POTASSIUM CHLORIDE 20 MEQ: 20 TABLET, EXTENDED RELEASE ORAL at 09:41

## 2020-01-01 RX ADMIN — NYSTATIN 500000 UNITS: 100000 SUSPENSION ORAL at 17:42

## 2020-01-01 RX ADMIN — Medication 2 MG: at 22:14

## 2020-01-01 RX ADMIN — PANTOPRAZOLE SODIUM 40 MG: 40 TABLET, DELAYED RELEASE ORAL at 06:37

## 2020-01-01 RX ADMIN — NYSTATIN 500000 UNITS: 100000 SUSPENSION ORAL at 09:51

## 2020-01-01 RX ADMIN — METOPROLOL TARTRATE 25 MG: 25 TABLET, FILM COATED ORAL at 08:19

## 2020-01-01 RX ADMIN — GUAIFENESIN 1200 MG: 600 TABLET, EXTENDED RELEASE ORAL at 08:19

## 2020-01-01 RX ADMIN — NYSTATIN 500000 UNITS: 100000 SUSPENSION ORAL at 13:57

## 2020-01-01 RX ADMIN — GUAIFENESIN 1200 MG: 600 TABLET, EXTENDED RELEASE ORAL at 09:51

## 2020-01-01 RX ADMIN — BUDESONIDE AND FORMOTEROL FUMARATE DIHYDRATE 2 PUFF: 160; 4.5 AEROSOL RESPIRATORY (INHALATION) at 08:18

## 2020-01-01 ASSESSMENT — ENCOUNTER SYMPTOMS
SHORTNESS OF BREATH: 1
BACK PAIN: 0
DIARRHEA: 0
CHOKING: 0
EYE DEVIATION: 0
STRIDOR: 0
TROUBLE SWALLOWING: 0
TROUBLE SWALLOWING: 0
VOMITING: 0
ANAL BLEEDING: 0
SORE THROAT: 0
NAUSEA: 0
NAUSEA: 0
APNEA: 0
VISUAL CHANGE: 0
SORE THROAT: 0
STRIDOR: 0
ABDOMINAL DISTENTION: 0
COLOR CHANGE: 0
VOMITING: 0
ABDOMINAL PAIN: 0
COLOR CHANGE: 0
SHORTNESS OF BREATH: 0
WHEEZING: 0
RECTAL PAIN: 0
ABDOMINAL PAIN: 0
VOICE CHANGE: 0
WHEEZING: 0
RECTAL PAIN: 0
COUGH: 0
VOICE CHANGE: 0
EYE DISCHARGE: 0
APNEA: 0
BLOOD IN STOOL: 0
BACK PAIN: 0
ABDOMINAL PAIN: 0
CHOKING: 0
VOMITING: 0
CONSTIPATION: 0
COUGH: 0
ABDOMINAL DISTENTION: 0
DIARRHEA: 0
CONSTIPATION: 0
EYE DISCHARGE: 0
ANAL BLEEDING: 0
DIFFICULTY BREATHING: 0
BLOOD IN STOOL: 0
CHEST TIGHTNESS: 0
CHEST TIGHTNESS: 0
NAUSEA: 0

## 2020-01-01 ASSESSMENT — PAIN DESCRIPTION - PAIN TYPE: TYPE: ACUTE PAIN

## 2020-01-01 ASSESSMENT — PAIN DESCRIPTION - ORIENTATION: ORIENTATION: LEFT;RIGHT

## 2020-01-01 ASSESSMENT — PAIN SCALES - GENERAL
PAINLEVEL_OUTOF10: 0
PAINLEVEL_OUTOF10: 5
PAINLEVEL_OUTOF10: 5
PAINLEVEL_OUTOF10: 3
PAINLEVEL_OUTOF10: 0
PAINLEVEL_OUTOF10: 0
PAINLEVEL_OUTOF10: 7
PAINLEVEL_OUTOF10: 5
PAINLEVEL_OUTOF10: 2
PAINLEVEL_OUTOF10: 0

## 2020-01-01 ASSESSMENT — PAIN DESCRIPTION - LOCATION: LOCATION: SHOULDER;BACK

## 2020-01-06 NOTE — PROGRESS NOTES
Subjective:      Patient Id:  Delia Perkins is a 80 y.o. female who presents today with   Chief Complaint   Patient presents with    Follow-up          HPI Seen at home for symptom management follow up rt multiple myeloma, demeanor calm and relaxed, NAD, no sedation. Seen in the home setting due to disease related symptoms and debility create heavy physical and financial burden to get to a clinic setting. Pain in back and shoulders rt MM and OA well controlled with current POC. . No dysphagia, dysgeusia, or mouth sores; weight stable, Appetite is good. No GI or  concerns. No natividad blood in stool or urine. SOB and edema at baseline. Negative excessive fatigue, fever, chills, myalgias, increased sputum production or cough, nausea, vomiting, chest pain, or orthopnea. Negative significant Sleep disturbance, depression, anxiety, or agitation episodes.     Past Medical History:   Diagnosis Date    A-fib [facility])     Actinic keratoses     Allergic rhinitis, cause unspecified 4/12/2012    Anxiety 4/12/2012    Arthritis of right shoulder region     Asthma     Candida esophagitis ([facility])     GI-Dr. Tor Tavarez    Chemotherapy-induced neuropathy ([facility])     Chronic pain syndrome     CKD (chronic kidney disease) stage 3, GFR 30-59 ml/min ([facility]) 9/29/2014    COPD (ch[facility] obstructive [facility] disease) [facility] [address]     Depression     Diabetes ([facility] [address] 08/2017    Diaphoresis 4/15/2016    Dyslipidemia     unable to tolerate statins/tricor    Dysphagia     Fatigue     FH: CAD (coronary artery disease) 5/23/2012    GERD (gastroesophageal reflux disease)     History of DVT of lower extremity     HTN (hypertension)     Hypogammaglobulinemia ([facility]) 10/16/2015    Insomnia 4/12/2012    Intertrigo     Labral tear of shoulder     right    Lipoma     Low back pain 1/7/2015    Lower extremity edema 4/12/2012    Multiple myeloma (HCC)     Neuropathy     hands and feet    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 Leon Villareal in [address] SOB (shortness of breath) 4/15/2016    Statin intolerance     Tendinopathy of right shoulder     Tremor 2012    Warfarin anticoagulation      Past Surgical History:   Procedure Laterality Date     SECTION      FOOT SURGERY      HEMORRHOID SURGERY      Dr. Leydi Hernandez Left 2013    ear basal cell    NERVE BLOCK Right 2016    [facility] JOY PALACI[facility] MD  SUPRASCAPULAR NB PULSED RF    OTHER SURGICAL HISTORY  14    [facility] NERVE BLOCK MEDIAN BRANCH    VENA CAVA FILTER PLACEMENT       Social History     Socioeconomic History    Marital status:      Spouse name: Not on file    Number of children: Not on file    Years of education: Not on file    Highest education level: Not on file   Occupational History    Not on file   Social Needs    Financial resource strain: Not on file    Food insecurity:     Worry: Not on file     Inability: Not on file    Transportation needs:     Medical: Not on file     Non-medical: Not on file   Tobacco Use    Smoking status: Former Smoker     Years: 43.00     Types: Cigarettes     Last attempt to quit: 1992     Years since quittin.0    Smokeless tobacco: Never Used   Substance and Sexual Activity    Alcohol use: Yes     Comment: rare    Drug use: No    Sexual activity: Not on file   Lifestyle    Physical activity:     Days per week: Not on file     Minutes per session: Not on file    Stress: Not on file   Relationships    Social connections:     Talks on phone: Not on file     Gets together: Not on file     Attends Jehovah's witness service: Not on file     Active member of club or organization: Not on file     Attends meetings of clubs or organizations: Not on file     Relationship status: Not on file    Intimate partner violence:     Fear of current or ex partner: Not on file Tenderness: There is no tenderness. There is no guarding or rebound. Musculoskeletal: Normal range of motion. General: No tenderness or deformity. Lymphadenopathy:      Cervical: No cervical adenopathy. Skin:     General: Skin is warm and dry. Capillary Refill: Capillary refill takes less than 2 seconds. Findings: No erythema or rash. Neurological:      Mental Status: She is alert and oriented to person, place, and time. Psychiatric:         Behavior: Behavior normal.         Thought Content: Thought content normal.           Assessment:       Diagnosis Orders   1. SOB (shortness of breath)     2. Neoplasm related pain     3. Palliative care encounter     4. Multiple myeloma, remission status unspecified ([facility] [address]            Plan:     Pain rt neoplasm and OA  - Continue current POC  - Heat or ice to painful areas, whichever is preferred  - Gentle ROM exercises  Pt is tolerating current pain meds without adverse effects or over sedation. Lowest effective dose used. Pt advised to call and notify palliative care for any adverse effects or sedation  Pt is able to maintain adequate functional level and participate in ADLs  OARRS reviewed. There is no indication of aberrant behavior  Pt advised to call for increasing or uncontrolled pain  Risk vs benefit assessed. Pt educated on risk of addiction. Pt advised to take only as prescribed and not to change frequency of pain meds without consulting palliative care first.      SOB  - Continue COPD Directed therapies   - Call for increased SOB, cough, sputum production, excessive fatigue, fever, chills, or myalgias  - Gentle exercise as tolerated  -  Rinse out mouth after inhaler use.   - COPD ER PACK in place: Call prior to initializing        TALI Cruz - BOY

## 2020-02-03 NOTE — TELEPHONE ENCOUNTER
Patient's  Lakia Kwan called to report that today, Marck Hook coughed up some brown yellow phlegm as a one-time incident. He denies she has any SOB, chest congestion or fever. He wanted to know if this is concerning at this time.

## 2020-02-12 NOTE — PROGRESS NOTES
status unspecified (HCC)  fentaNYL (DURAGESIC) 50 MCG/HR   3. Neoplasm related pain  fentaNYL (DURAGESIC) 50 MCG/HR   4. SOB (shortness of breath)     5. Frequent falls  Internal Referral to Home Health   6. Chronic right shoulder pain  Internal Referral to Home Health   7. Gait instability  Internal Referral to Home Health   8. Severe muscle deconditioning  Internal Referral to Home Health          Plan:    SOB  - Continue COPD Directed therapies   - Call for increased SOB, cough, sputum production, excessive fatigue, fever, chills, or myalgias  - Gentle exercise as tolerated  -  Rinse out mouth after inhaler use. - COPD ER PACK in place: Call prior to initializing    Pain rt OA, neoplasm and deconditioning  - Continue current POC  - Heat or ice to painful areas, whichever is preferred  - Gentle ROM exercises, PT ordered  Pt is tolerating current pain meds without adverse effects or over sedation. Lowest effective dose used. Pt advised to call and notify palliative care for any adverse effects or sedation  Pt is able to maintain adequate functional level and participate in ADLs  OARRS reviewed. There is no indication of aberrant behavior  Pt advised to call for increasing or uncontrolled pain  Risk vs benefit assessed. Pt educated on risk of addiction. Pt advised to take only as prescribed and not to change frequency of pain meds without consulting palliative care first.    Frequent falls, physical deconditioning, age related debility  - Home health ordered for nursing assessment, PT, and OT  - Safety and comfort measure reviewed    Orders Placed This Encounter   Procedures    Internal Referral to Home Health     Referral Priority:   Routine     Referral Type:   [facility]     Referral Reason:   Specialty Services Required     Number of Visits Requested:   1     Orders Placed This Encounter   Medications    fentaNYL (DURAGESIC) 50 MCG/HR     Sig: Place 1 patch onto the skin every 72 hours for 30 days.

## 2020-02-14 NOTE — TELEPHONE ENCOUNTER
Walter Yu from Home health-     areds- eye vision capsules Ok? d3- 5000 units daily. Hair skin and nails 1 daily  And a med for brain health called healthy brain- OTC take 2 daily. b-12 500 mcg daily    Needs to know if these medication are ok for patient to take with current medications. Per sujatha pt has been hallucinating off and on.  corroborate this story. Please advise. Walter Yu aware you are out until Monday.

## 2020-02-15 PROBLEM — G93.40 ENCEPHALOPATHY: Status: ACTIVE | Noted: 2020-01-01

## 2020-02-15 NOTE — H&P
awake. Appearance: She is overweight. Comments: Confusion and delirium present   HENT:      Head: Normocephalic and atraumatic. Right Ear: Tenderness present. A middle ear effusion is present. There is mastoid tenderness. Left Ear: Hearing, tympanic membrane and external ear normal.      Nose: Congestion present. Mouth/Throat:      Mouth: Mucous membranes are dry. Eyes:      Conjunctiva/sclera: Conjunctivae normal.      Pupils: Pupils are equal, round, and reactive to light. Neck:      Musculoskeletal: Normal range of motion and neck supple. Cardiovascular:      Rate and Rhythm: Normal rate and regular rhythm. Pulses: Normal pulses. Heart sounds: Normal heart sounds. Pulmonary:      Effort: Pulmonary effort is normal.      Breath sounds: Normal breath sounds. Abdominal:      General: Bowel sounds are normal.      Palpations: Abdomen is soft. Musculoskeletal: Normal range of motion. Skin:     General: Skin is warm and dry. Capillary Refill: Capillary refill takes less than 2 seconds. Neurological:      General: No focal deficit present. Mental Status: She is disoriented and confused. GCS: GCS eye subscore is 4. GCS verbal subscore is 4. GCS motor subscore is 6. Cranial Nerves: Cranial nerves are intact. Sensory: Sensation is intact. Motor: Weakness present. Psychiatric:         Cognition and Memory: Cognition is impaired. Memory is impaired. She exhibits impaired recent memory. Review of Systems   Unable to perform ROS: Mental status change       Medications:  Reviewed    No current facility-administered medications on file prior to encounter. Current Outpatient Medications on File Prior to Encounter   Medication Sig Dispense Refill    XARELTO 20 MG TABS tablet TAKE 1 TABLET DAILY WITH BREAKFAST.  (PATIENT NEEDS A FOLLOW UP APPOINTMENT WITH DR. Janak Allen) 90 tablet 0    fentaNYL (DURAGESIC) 50 MCG/HR Place 1 patch onto the rhinitis, cause unspecified 2012    Anxiety 2012    Arthritis of right shoulder region     Asthma     Candida esophagitis [facility]     GI-Dr. Leeann Jameson    Chemotherapy-induced neuropathy ([facility])     Chronic pain syndrome     CKD (chronic kidney disease) stage 3, GFR 30-59 ml/min ([facility]) 2014    COPD ([facility] disease) [facility] [address]     Depression     Diabetes ([facility] [address] 2017    Diaphoresis 4/15/2016    Dyslipidemia     unable to tolerate statins/tricor    Dysphagia     Fatigue     FH: CAD (coronary artery disease) 2012    GERD (gastroesophageal reflux disease)     History of DVT of lower extremity     HTN (hypertension)     Hypogammaglobulinemia ([facility] 10/16/2015    Insomnia 2012    Intertrigo     Labral tear of shoulder     right    Lipoma     Low back pain 2015    Lower extremity edema 2012    Multiple myeloma ([facility]     Neuropathy     hands and feet    Osteoarthritis of lumbosacral spine without myelopathy     Osteoarthritis of spine     PE (pulmonary embolism)     Right arm pain     Seizures ([facility])     Skin cancer, basal cell     Dr. Alfredo Harrison in [address] [place] [address] SOB (shortness of breath) 4/15/2016    Statin intolerance     Tendinopathy of right shoulder     Tremor 2012    Warfarin anticoagulation        Past Surgical History:   Procedure Laterality Date     SECTION      FOOT SURGERY      HEMORRHOID SURGERY      Dr. Shane Jaramillo Left     ear basal cell    NERVE BLOCK Right 2016    [facility] JOY PALACIOS MD  SUPRASCAPULAR NB PULSED RF    OTHER SURGICAL HISTORY  14    [facility] NERVE BLOCK MEDIAN BRANCH    VENA CAVA FILTER PLACEMENT          Family History   Problem Relation Age of Onset    Cancer Mother     Kidney Disease Mother     Stroke Mother     Cancer Father     Arthritis Sister     Cancer Sister     Diabetes Sister     Arthritis Brother     Cancer Brother     Diabetes Brother     Heart Disease Brother multiple stents        Social History     Socioeconomic History    Marital status:      Spouse name: Not on file    Number of children: Not on file    Years of education: Not on file    Highest education level: Not on file   Occupational History    Not on file   Social Needs    Financial resource strain: Not on file    Food insecurity:     Worry: Not on file     Inability: Not on file    Transportation needs:     Medical: Not on file     Non-medical: Not on file   Tobacco Use    Smoking status: Former Smoker     Years: 43.00     Types: Cigarettes     Last attempt to quit: 1992     Years since quittin.1    Smokeless tobacco: Never Used   Substance and Sexual Activity    Alcohol use: Yes     Comment: rare    Drug use: No    Sexual activity: Not on file   Lifestyle    Physical activity:     Days per week: Not on file     Minutes per session: Not on file    Stress: Not on file   Relationships    Social connections:     Talks on phone: Not on file     Gets together: Not on file     Attends Rastafari service: Not on file     Active member of club or organization: Not on file     Attends meetings of clubs or organizations: Not on file     Relationship status: Not on file    Intimate partner violence:     Fear of current or ex partner: Not on file     Emotionally abused: Not on file     Physically abused: Not on file     Forced sexual activity: Not on file   Other Topics Concern    Not on file   Social History Narrative    Lives with , has first floor bedroom and bathroom as [place].         Infusion Medications:   Scheduled Medications:   PRN Meds:     Labs:   Recent Labs     02/15/20  1312   WBC 6.9   HGB 14.1   HCT 44.1        Recent Labs     02/15/20  1312   *   K 4.0   CL 97   CO2 24   BUN 8   CREATININE 0.64   CALCIUM 9.6     Recent Labs     02/15/20  1312   AST 14   ALT 6   BILITOT 0.8*   ALKPHOS 92     Recent Labs     02/15/20  1312   INR 1.2 No results for input(s): Levonia Citrin in the last 72 hours. Urinalysis:   Lab Results   Component Value Date    NITRU Negative 02/15/2020    WBCUA 0-2 02/15/2020    BACTERIA Negative 02/15/2020    RBCUA 0-2 02/15/2020    BLOODU Negative 02/15/2020    SPECGRAV 1.021 02/15/2020    GLUCOSEU Negative 02/15/2020    GLUCOSEU NEG 03/04/2012       Radiology:   Most recent    Chest CT      WITH CONTRAST:  Results for orders placed during the hospital encounter of 02/05/13   CT Chest W Contrast    Narrative CT SCAN OF THE CHEST WITH CONTRAST ENHANCEMENT     CLINICAL HISTORY  Hemoptysis, dyspnea     COMPARISON  8/18/12     FINDINGS  Spiral axial CT images of the chest were obtained during the  bolus intravenous administration of 75 cc's of Optiray 320 contrast  material.       No mediastinal or hilar adenopathy is evident. The thoracic aorta is  normal in caliber and appearance. The cardiac silhouette is above  normal limits in size. Stable scarring is again noted at the right  apex. There is vague infiltrate in the right lower lobe. No pleural  effusions are seen. The osseous structures of the chest are intact. IMPRESSION  VAGUE RIGHT LOWER LOBE INFILTRATE. -  Rosy Iyer ByRonnie Knox M.D. Released By- Francisco Knox M.D. Released Date Time- 02/06/13 8731   This document has been electronically signed. ------------------------------------------------------------------------------        WITHOUT CONTRAST: No results found for this or any previous visit.     CXR      2-view:   Results for orders placed during the hospital encounter of 03/18/19   XR CHEST STANDARD (2 VW)    Narrative EXAMINATION: Chest x-ray, 2 view    HISTORY: Shortness of breath    TECHNIQUE: Frontal and lateral views of the chest    COMPARISON: Chest radiograph from January 22, 2018    FINDINGS:     Right-sided central venous access port is present, terminating at the cavoatrial junction. Heart size is enlarged. Mild pulmonary vascular prominence is similar to prior examination. No pneumothorax, pleural effusion, or focal consolidation. Degenerative   changes of the right shoulder. Osseous structures of the thorax appear intact. Impression Cardiomegaly. Portable:   Results for orders placed during the hospital encounter of 10/15/19   XR CHEST PORTABLE    Narrative Patient MRN: [id number]    : 3/3/1933    Age:  80 years    Gender: Female    Order Date: 10/15/2019 5:00 PM.     Exam: XR CHEST PORTABLE    Number of Views: 1     Indication:  Shortness of breath    Comparison: 3/18/2019    Findings: Stable, enlarged cardiac mediastinal silhouette with underlying central pulmonary vascular congestion, severe bilateral glenohumeral osteoarthritis, thoracic aortic vascular calcifications, stable appearance of a right-sided Mediport with   bibasilar airspace disease is also bilateral pleural effusions. Impression Impression:    1. Stable, enlarged cardiac mediastinal silhouette and underlying central pulmonary vascular congestion and severe bilateral glenohumeral osteoarthritis. 2. Nonspecific bibasilar airspace disease, findings can be seen in infiltrate/pneumonia and/or atelectasis. . Small bilateral pleural effusions. Echo No results found for this or any previous visit. Assessment/Plan:    # Acute delirium likely to be narcotic related: Patient with history of multiple myeloma currently receiving chemo and outpatient IVIG patient receiving multiple medications such as Benadryl, Dilaudid and fentanyl patch. Right-sided mastitis noted on CT; however unlikely because of patient's delirium. Will hold Dilaudid and remove fentanyl patch. Admit for observation. Continue fall precautions, aspiration precautions. Up with assist. Notify MD if becomes very agitated and unable to be calmed down without medication. Reorient PRN. # Mastitis:  We will start

## 2020-02-15 NOTE — ED PROVIDER NOTES
Kelsi Anderson MD  SUPRASCAPULAR NB PULSED RF    OTHER SURGICAL HISTORY  12/17/14    [facility] NERVE BLOCK MEDIAN [facility]ANCH    VENA CAVA FILTER PLACEMENT           CURRENT MEDICATIONS       Current Discharge Medication List      CONTINUE these medications which have NOT CHANGED    Details   Multiple Vitamins-Minerals (PRESERVISION AREDS 2 PO) Take by mouth 2 times daily      Cholecalciferol (VITAMIN D) 50 MCG (2000 UT) CAPS capsule Take by mouth      XARELTO 20 MG TABS tablet TAKE 1 TABLET DAILY WITH BREAKFAST. (PATIENT NEEDS A FOLLOW UP APPOINTMENT WITH DR. Melita Perea)  Qty: 90 tablet, Refills: 0    Comments: PLEASE SCHEDULE APPOINTMENT  Associated Diagnoses: Paroxysmal atrial fibrillation (HCC)      fentaNYL (DURAGESIC) 50 MCG/HR Place 1 patch onto the skin every 72 hours for 30 days. Qty: 10 patch, Refills: 0    Comments: Reduce doses taken as pain becomes manageable  Associated Diagnoses: Multiple myeloma, remission status unspecified ([facility] Neoplasm related pain      HYDROmorphone (DILAUDID) 2 MG tablet Take 1 tablet by mouth every 6 hours as needed for Pain for up to 30 days. Qty: 120 tablet, Refills: 0    Comments: Reduce doses taken as pain becomes manageable  Associated Diagnoses: Multiple myeloma, remission status unspecified ([facility].);  Neoplasm related pain; Palliative care encounter      metFORMIN (GLUCOPHAGE-XR) 750 MG extended release tablet TAKE ONE TABLET BY MOUTH TWO TIMES A DAY  Qty: 60 tablet, Refills: 2      diltiazem (CARDIZEM CD) 360 MG extended release capsule TAKE 1 CAPSULE DAILY  Qty: 90 capsule, Refills: 4      SYMBICORT 160-4.5 MCG/ACT AERO USE 2 INHALATIONS TWICE A DAY  Qty: 30.6 g, Refills: 4      diclofenac sodium 1 % GEL APPLY TO both knees and shoulders 4 TIMES DAILY AS NEEDED  Qty: 5 Tube, Refills: 3    Associated Diagnoses: Arthritis of right shoulder region; Chronic pain of both knees      primidone (MYSOLINE) 50 MG tablet TAKE 1 TABLET NIGHTLY  Qty: 90 tablet, Refills: 1    Associated Diagnoses: Tremor      fluticasone (FLONASE) 50 MCG/ACT nasal spray 1 spray by Each Nostril route daily  Qty: 2 Bottle, Refills: 1      guaiFENesin (MUCINEX) 600 MG extended release tablet Take 2 tablets by mouth 2 times daily  Qty: 120 tablet, Refills: 3    Associated Diagnoses: Chronic obstructive pulmonary disease, unspecified COPD type ([facility] Cough; Wheeze      metoprolol tartrate (LOPRESSOR) 25 MG tablet Take 1 tablet by mouth 2 times daily  Qty: 60 tablet, Refills: 0      lenalidomide (REVLIMID) 25 MG chemo capsule Take 25 mg by mouth Indications: pt. takes at HS       esomeprazole (NEXIUM) 40 MG delayed release capsule TAKE ONE CAPSULE BY MOUTH EVERY MORNING BEFORE BREAKFAST  Qty: 90 capsule, Refills: 3    Associated Diagnoses: Gastroesophageal reflux disease, esophagitis presence not specified      potassium chloride (KLOR-CON M) 20 MEQ extended release tablet TAKE 1 TABLET DAILY  Qty: 90 tablet, Refills: 3      OXYGEN Portable concentrator for oxygen use at 3 L with ambulation  Qty: 1 Units, Refills: 0      nystatin (MYCOSTATIN) 039570 UNIT/ML suspension Take 5 mLs by mouth 4 times daily  Qty: 473 mL, Refills: 3    Associated Diagnoses: Thrush      DULoxetine (CYMBALTA) 60 MG extended release capsule Take 1 capsule by mouth daily  Qty: 30 capsule, Refills: 2      nystatin (MYCOSTATIN) 597940 UNIT/GM powder Apply 3 times daily. Qty: 60 g, Refills: 5    Associated Diagnoses: Candidiasis; Palliative care encounter      ipratropium-albuterol (DUONEB) 0.5-2.5 (3) MG/3ML SOLN nebulizer solution Inhale 3 mLs into the lungs every 4 hours  Qty: 540 mL, Refills: 0    Associated Diagnoses: Chronic obstructive pulmonary disease, unspecified COPD type ([facility] SOB (shortness of breath); Wheeze      furosemide (LASIX) 20 MG tablet TAKE 2 TABLETS BY MOUTH EVERY MORNING AND TAKE 1 ADDITIONAL TABLET IN THE AFTERNOON IF NEEDED  Qty: 180 tablet, Refills: 0    Associated Diagnoses: Congestive heart failure ([facility]  Edema, unspecified type @FLOW([id number])@      PHYSICAL EXAM    (up to 7 for level 4, 8 or more for level 5)     ED Triage Vitals   BP Temp Temp Source Pulse Resp SpO2 Height Weight   02/15/20 1230 02/15/20 1227 02/15/20 1227 02/15/20 1227 02/15/20 1227 02/15/20 1227 02/15/20 1227 02/15/20 1227   (!) 156/79 98.4 °F (36.9 °C) Oral 90 18 98 % 5' 5\" (1.651 m) 174 lb (78.9 kg)       Physical Exam  Vitals signs and nursing note reviewed. Constitutional:       Appearance: Normal appearance. HENT:      Head: Normocephalic and atraumatic. Right Ear: Tympanic membrane normal.      Left Ear: Tympanic membrane normal.      Nose: Nose normal.      Mouth/Throat:      Mouth: Mucous membranes are moist.   Neck:      Musculoskeletal: Normal range of motion and neck supple. Cardiovascular:      Rate and Rhythm: Normal rate and regular rhythm. Pulses: Normal pulses. Heart sounds: Normal heart sounds. Pulmonary:      Effort: Pulmonary effort is normal.      Breath sounds: Normal breath sounds. Abdominal:      General: Abdomen is flat. Palpations: Abdomen is soft. Skin:     General: Skin is warm and dry. Neurological:      General: No focal deficit present. Mental Status: She is alert. She is disoriented and confused. Comments: Confused and slightly agitated at times         DIAGNOSTIC RESULTS     EKG: All EKG's are interpreted by the Emergency Department Physician who either signs or Co-signsthis chart in the absence of a cardiologist.        RADIOLOGY:   Patricia Cali such as CT, Ultrasound and MRI are read by the radiologist. Enrico Avelar radiographic images are visualized and preliminarily interpreted by the emergency physician with the below findings:      Interpretation per the Radiologist below, if available at the time ofthis note:    CT Head WO Contrast   Final Result      No acute intracranial process. Nonspecific opacification of the right mastoid air cells and right middle ear.  Correlation for otomastoiditis recommended. All CT scans at this facility use dose modulation, iterative reconstruction, and/or weight based dosing when appropriate to reduce radiation dose to as low as reasonably achievable.             ED BEDSIDE ULTRASOUND:   Performed by ED Physician - none    LABS:  Labs Reviewed   URINE RT REFLEX TO CULTURE - Abnormal; Notable for the following components:       Result Value    Ketones, Urine TRACE (*)     Protein,  (*)     All other components within normal limits   CBC WITH AUTO DIFFERENTIAL - Abnormal; Notable for the following components:    MCH 26.8 (*)     MCHC 32.0 (*)     RDW 18.2 (*)     Monocytes Absolute 0.9 (*)     All other components within normal limits   COMPREHENSIVE METABOLIC PANEL W/ REFLEX TO MG FOR LOW K - Abnormal; Notable for the following components:    Sodium 133 (*)     Glucose 109 (*)     Total Bilirubin 0.8 (*)     All other components within normal limits   PROTIME-INR - Abnormal; Notable for the following components:    Protime 15.4 (*)     All other components within normal limits   COMPREHENSIVE METABOLIC PANEL W/ REFLEX TO MG FOR LOW K - Abnormal; Notable for the following components:    Glucose 125 (*)     BUN 6 (*)     Alb 3.3 (*)     All other components within normal limits   CBC WITH AUTO DIFFERENTIAL - Abnormal; Notable for the following components:    MCHC 32.6 (*)     RDW 18.3 (*)     Basophils Absolute 0.3 (*)     All other components within normal limits   POCT GLUCOSE - Abnormal; Notable for the following components:    POC Glucose 125 (*)     All other components within normal limits   POCT GLUCOSE - Abnormal; Notable for the following components:    POC Glucose 141 (*)     All other components within normal limits   RAPID INFLUENZA A/B ANTIGENS   URINE CULTURE    Narrative:     ORDER#: [id number]                          ORDERED BY: Telma Andrea  SOURCE: Urine Clean Catch                  COLLECTED:  02/15/20 13:00  ANTIBIOTICS AT FRANTZ.:                      RECEIVED :  02/15/20 13:16   LACTATE, SEPSIS   AMYLASE   MICROSCOPIC URINALYSIS   URINE RT REFLEX TO CULTURE   CBC   BASIC METABOLIC PANEL   POCT GLUCOSE   POCT GLUCOSE   POCT GLUCOSE   POCT GLUCOSE   POCT GLUCOSE   POCT GLUCOSE   POCT GLUCOSE   POCT GLUCOSE   POCT GLUCOSE       All other labs were within normal range or not returned as of this dictation. EMERGENCY DEPARTMENT COURSE and DIFFERENTIAL DIAGNOSIS/MDM:   Vitals:    Vitals:    02/16/20 0301 02/16/20 0723 02/16/20 0818 02/16/20 1130   BP:  132/60  130/64   Pulse: 62 (!) 46  62   Resp:       Temp: 98.2 °F (36.8 °C) 97.5 °F (36.4 °C) 97.5 °F (36.4 °C) 98.2 °F (36.8 °C)   TempSrc:  Oral     SpO2: 95% 99%  97%   Weight:       Height:                    MDM  Number of Diagnoses or Management Options  Dehydration:   Delirium:   Mastoiditis of left side:   Diagnosis management comments: 80years old with known history of multiple myeloma A. fib hypertension chronic pain syndrome patient currently receiving multiple medication and are new to her may be contributing to her confusion. Presented to the ER today with some increased confusion and agitation for the last 2 days significantly getting worse labs none significant urine showed no UTI but CT the head shows questionable mastoiditis some mild dehydration patient will be admitted for acute delirium encephalopathy and dehydration under the hospitalist service on conversation with the patient her  and her son there was concern about going home with significant delirium and confusion in the middle of the night were her only caregiver with her  who also is her age and will have hard time controlling her behavior      CONSULTS:  IP CONSULT TO HOSPICE    PROCEDURES:  Unless otherwise noted below, none     Procedures    FINAL IMPRESSION      1. Dehydration    2. Delirium    3.  Mastoiditis of left side          DISPOSITION/PLAN   DISPOSITION Admitted 02/15/2020 04:55:55

## 2020-02-15 NOTE — ED NOTES
Pt medicated per orders and IV inserted with no complications. Call light in reach. Will cont to monitor.  at bedside.       Gina Hogan RN  02/15/20 7332

## 2020-02-16 NOTE — PROGRESS NOTES
[facility] Occupational Therapy      Date: 2020  Patient Name: Mirtha Alfonso        MRN: [id number]  Account: [de-identified]   : 3/3/1933  (80 y.o.)  Room: E280/L942-79    Pt. is of observation status. To comply with medicare and insurance regulations, to see patient we require updated orders including a valid OT treatment diagnosis. Please reorder as appropriate.      Electronically signed by JERAD Valentin on 2020 at 3:07 PM

## 2020-02-16 NOTE — PROGRESS NOTES
0300 - Call received from Anamika Stafford in the [facility] room. He stated that the pt. Has been having increasingly severe jerking movements in bed. This RN to the assess the pt at bedside. On assessment, pt is arousable to verbal and physical stimuli. Pt, however, is very confused and unable to state her name. At the beginning of shift, pt. Was AxOx1. Pt. Appears to be sleeping with mouth open, however, arms and legs intermittently jerking. Attempting to perform Neuro assessment, however; pt. resisting keeping eyes open and not following verbal prompts. VS obtained. BP - 95/69, HR 94, 95% on 2L via NC, and 98.2 axillary. POC glucose checked; result was 106mg/dl. 5224 - Message sent to hospitalist, Dr. Marlon Bhat. Dr. Diamond Hawley he was aware of stated symptoms and ordered to admin. Ativan 1mg IV times one. Medication administered per order at 0321. Reassessed pt. Medication was effective. Pt is calm and sleeping. Breathing is even and regular. HOB locked at 30 degrees. Pt lying on right side with pillow support. Bed alarm on. Door open. Will continue to monitor. 4786 - Call received from monitor room. Pt's HR went down in to the 30's and then returned into the 60's. Pt. Still in Afib. Pt. Assessed at bedside. Appears in no distress. Hospitalist notified via [employer]. Awaiting response at this time.

## 2020-02-16 NOTE — PROGRESS NOTES
Pt. Was transported to the floor just before shift change. Day shift nurse gave handoff report at bedside. Pt's , Jovita Valdovinos, is also at bedside. Pt was brought in for change in mental status per the , who is her caregiver. She is alert and oriented to person only. Pt is also confused and talking to people who are not in the room. Pt. Requires frequent verbal prompts. [employer] monitor was placed in the room because of pt's confusion and impulsivity. While in the room, pt. Attempted to pull out her IV in her L hand as well as her heart monitor. On assessment, pt's heart rate is irregular. Pt. Does have a stephanie. Of Afib. Monitor room confirmed Afib Rhythm. Pt. Is also sweating. Room temp is low. Pt. Afebrile. Pt's  states, she occasionally get like this at home. Gown is damp and changed for pt. Comfort. Ortho BP ordered times one. Results were negative. See flowsheets. PCA mentioned pt. Being extremely unsteady while standing at the bedside. Pt. Uses a walker at home. Admission questions answered by pt's . Pt. Is a poor historian at this time.  feels that her recent decline in health may be attributed to her medications, including the Revlimid.  verbally expressed not wanting his wife to have this medication. Medication was held at evening med pass. Hospitalist notified of 's request to get rid of this particular med order. NP stated, \" and Dr. Jose Elias Spencer need to discuss that\"  Regarding the pt. Being ordered the medication.

## 2020-02-16 NOTE — PROGRESS NOTES
non-tender, non-distended with normal bowel sounds. Musculoskeletal: No clubbing, cyanosis or edema bilaterally. Full range of motion without deformity. Skin: Skin color, texture, turgor normal.  No rashes or lesions. Neuro: Non Focal. Symetrical motor and tone. Nl Comprehension, sleepy and drowsy reflexes. Psychiatric: Alert and oriented, thought content appropriate, normal insight  Capillary Refill: Brisk,< 3 seconds   Peripheral Pulses: +2 palpable, equal bilaterally       Labs:   Recent Labs     02/15/20  1312 02/15/20  1949   WBC 6.9 7.1   HGB 14.1 14.0   HCT 44.1 42.8    296     Recent Labs     02/15/20  1312 02/15/20  1949   * 141   K 4.0 4.5   CL 97 104   CO2 24 22   BUN 8 6*   CREATININE 0.64 0.50   CALCIUM 9.6 9.1     Recent Labs     02/15/20  1312 02/15/20  1949   AST 14 17   ALT 6 8   BILITOT 0.8* 0.7   ALKPHOS 92 86     Recent Labs     02/15/20  1312   INR 1.2     No results for input(s): Manford Wirt in the last 72 hours. Urinalysis:      Lab Results   Component Value Date    NITRU Negative 02/15/2020    WBCUA 0-2 02/15/2020    BACTERIA Negative 02/15/2020    RBCUA 0-2 02/15/2020    BLOODU Negative 02/15/2020    SPECGRAV 1.021 02/15/2020    GLUCOSEU Negative 02/15/2020    GLUCOSEU NEG 03/04/2012       Radiology:  CT Head WO Contrast   Final Result      No acute intracranial process. Nonspecific opacification of the right mastoid air cells and right middle ear. Correlation for otomastoiditis recommended. All CT scans at this facility use dose modulation, iterative reconstruction, and/or weight based dosing when appropriate to reduce radiation dose to as low as reasonably achievable.               Assessment/Plan:    Active Hospital Problems    Diagnosis Date Noted    Encephalopathy [G93.40] 02/15/2020        # Encephalopathy likely due to narcotic related: Patient with history of multiple myeloma currently receiving chemo and outpatient IVIG patient receiving

## 2020-02-17 NOTE — ACP (ADVANCE CARE PLANNING)
visited with patient and family to discuss advanced care planning and Advanced Directives. Patient's family thought they might have a copy at home but did take another packet in case they could not find a completed packet at home.

## 2020-02-17 NOTE — PROGRESS NOTES
effort. Clear to auscultation, bilaterally without Rales/Wheezes/Rhonchi. Cardiovascular: Regular rate and rhythm with normal S1/S2 without murmurs, rubs or gallops. Abdomen: Soft, non-tender, non-distended with normal bowel sounds. Musculoskeletal: No clubbing, cyanosis or edema bilaterally. Full range of motion without deformity. Skin: Skin color, texture, turgor normal.  No rashes or lesions. Neuro: Non Focal. Symetrical motor and tone. Nl Comprehension, sleepy and drowsy reflexes. Psychiatric: Alert and oriented, thought content appropriate, normal insight  Capillary Refill: Brisk,< 3 seconds   Peripheral Pulses: +2 palpable, equal bilaterally       Labs:   Recent Labs     02/15/20  1312 02/15/20  1949 02/17/20  0716   WBC 6.9 7.1 9.0   HGB 14.1 14.0 14.9   HCT 44.1 42.8 46.1    296 284     Recent Labs     02/15/20  1312 02/15/20  1949 02/17/20  0716   * 141 139   K 4.0 4.5 3.5   CL 97 104 102   CO2 24 22 25   BUN 8 6* 8   CREATININE 0.64 0.50 0.62   CALCIUM 9.6 9.1 9.2     Recent Labs     02/15/20  1312 02/15/20  1949   AST 14 17   ALT 6 8   BILITOT 0.8* 0.7   ALKPHOS 92 86     Recent Labs     02/15/20  1312   INR 1.2     No results for input(s): Flako Saul in the last 72 hours. Urinalysis:      Lab Results   Component Value Date    NITRU Negative 02/15/2020    WBCUA 0-2 02/15/2020    BACTERIA Negative 02/15/2020    RBCUA 0-2 02/15/2020    BLOODU Negative 02/15/2020    SPECGRAV 1.021 02/15/2020    GLUCOSEU Negative 02/15/2020    GLUCOSEU NEG 03/04/2012       Radiology:  CT Head WO Contrast   Final Result      No acute intracranial process. Nonspecific opacification of the right mastoid air cells and right middle ear. Correlation for otomastoiditis recommended. All CT scans at this facility use dose modulation, iterative reconstruction, and/or weight based dosing when appropriate to reduce radiation dose to as low as reasonably achievable.       XR SHOULDER RIGHT (MIN 2 VIEWS)    (Results Pending)           Assessment/Plan:    Active Hospital Problems    Diagnosis Date Noted    Encephalopathy [G93.40] 02/15/2020        # Encephalopathy likely due to narcotic related: Patient with history of multiple myeloma currently receiving chemo and outpatient IVIG patient receiving multiple medications such as Benadryl, Dilaudid and fentanyl patch. Cont to hold all Narcotics at the moment  UA is neg  CT head is neg for any acute concerning abnormalities    Please no more benzos or sedatives  Pt is medically cleared for discharge, working on either SNF vs     #right shoulder pain:  -will get an xray  -lidocaine patch    # Mastitis: this is mostly viral, cancel the ABx     # A fib: rate controlled. Goal K and Mg 4.0 and 2.0, respectively. On Long term anticoagulation     # COPD, Stable. Duonebs PRN. Incentive Spirometry, Respiratory therapist assessment. Resume home meds.      # DMII: ISS, hypoglycemia protocol POCT Glucose TIDAC & QHS     Advanced care planning:   Discussed goals of care with patient. Explained in extensive detail nuances between full code, DNR CCA and DNR CC, Palliative care, [facility] and Hospice care. Discussed focusing on maximizing length of life vs maximizing quality of life and how in some cases attempting to maximize one means sacrificing the other.  wishes for wife to remain full code. Additional work up or/and treatment plan may be added today or then after based on clinical progression. I am managing a portion of pt care. Some medical issues are handled by other specialists. Additional work up and treatment should be done in out pt setting by pt PCP and other out pt providers. In addition to examining and evaluating pt, I spent additional time explaining care, normal and abnormal findings, and treatment plan. All of pt questions were answered. Counseling, diet and education were  provided. Case will be discussed with nursing staff when appropriate. Family will be updated if and when appropriate.       Diet: DIET CARB CONTROL;    Code Status: Full Code    PT/OT Eval           Electronically signed by Elsa Tavarez MD on 2/17/2020 at 2:19 PM

## 2020-02-17 NOTE — CARE COORDINATION
PER [place] WITH [facility] ,  OF PATIENT HAD WALKED TO [facility] REQUESTING INFORMATION ON RESPITE CARE. ORDER OBTAINED PER NURSING, MEETING SET FOR 4PM HERE ON 4 TOWER.
Pt very lethargic and not holding a conversation. Met with spouse at bedside. He reports he has been the primary caregiver for pt and he plans for her to return to home. Discussed possible DC to SNF which he declines. He would like pt to return to home with [facility] and [facility]. He also needs a script for a freddy lift. Pt uses a [employer] bed, rollator and has a bedside commode. Spouse says he has been in contact with private duty caregivers as an option.
2/15/2020 at 6:06 PM

## 2020-02-17 NOTE — PROGRESS NOTES
Yes(Avasys)  Type of devices: All fall risk precautions in place    Subjective  Pre Treatment Pain Screening  Pain at present: 0  Scale Used: Numeric Score  Intervention List: Patient able to continue with treatment, Nurse/Physician notified  Comments / Details: Pt. indicates pain in R shoulder with mobility; RN notified    Pain Reassessment:   Pain Assessment  Patient Currently in Pain: Denies  Pain Assessment: 0-10  Pain Level: 0(Pt. states she has discomfort in R shoulder with mobility. Does not rate. RN notified.)       Prior Level of Function:  Social/Functional History  Lives With: Spouse  Type of Home: House  Home Layout: One level  Home Access: Level entry  Bathroom Shower/Tub: Walk-in shower  Bathroom Toilet: Standard  Bathroom Equipment: Commode  Home Equipment: 4 wheeled walker, Oxygen  Receives Help From: Family  ADL Assistance: Needs assistance  Homemaking Assistance: Needs assistance(Spouse)  Ambulation Assistance: Needs assistance(rollator- spouse assists)  Transfer Assistance: Needs assistance(unable to provide details but pt seems to report her spouse is with her each time she transfers/ambulates)  Active : No  Patient's  Info: Spouse    OBJECTIVE:     Orientation Status:  Orientation  Overall Orientation Status: Within Functional Limits(With increased time at times)    Observation:  Observation/Palpation  Posture: Fair  Observation: Pt. drowsy but easily aroused, pleasant but anxious, on 2L O2    Cognition Status:  Cognition  Overall Cognitive Status: Exceptions  Arousal/Alertness: Delayed responses to stimuli  Following Commands:  Follows one step commands with repetition  Attention Span: Difficulty attending to directions, Difficulty dividing attention  Memory: Decreased recall of precautions, Decreased recall of recent events, Decreased short term memory, Decreased long term memory  Safety Judgement: Decreased awareness of need for assistance, Decreased awareness of need for safety  Problem Solving: Assistance required to generate solutions, Assistance required to identify errors made, Assistance required to implement solutions, Assistance required to correct errors made  Insights: Decreased awareness of deficits  Initiation: Requires cues for some  Sequencing: Requires cues for some  Cognition Comment: Verbal cues and increased time; pt. [place] which does impact need to have directions repeated    Perception Status:  Perception  Overall Perceptual Status: [facility]    Sensation Status:  Sensation  Overall Sensation Status: [facility]    Vision and Hearing Status:  Vision  Vision: Within Functional Limits  Hearing  Hearing: Exceptions to [facility]  Hearing Exceptions: Hard of hearing/hearing concerns, No hearing aid     ROM:   LUE AROM (degrees)  LUE AROM : Exceptions  LUE General AROM: Limited at shoulder to 80°, decreased active mobility  Left Hand AROM (degrees)  Left Hand AROM: [facility]  Left Hand General AROM: Arthritic changes  RUE AROM (degrees)  RUE AROM : Exceptions  RUE General AROM: Limited AROM due to old fall; significant pain with mobility  Right Hand AROM (degrees)  Right Hand AROM: [facility]  Right Hand General AROM: Arthritic changes    Strength:  LUE Strength  Gross LUE Strength: Exceptions to [facility]  L Hand General: 3/5  LUE Strength Comment: Shoulders NT due to significant shoulder ROM deficits; elbows/wrists 3/5   RUE Strength  Gross RUE Strength: Exceptions to [facility]  R Hand General: 3/5  RUE Strength Comment: Shoulders NT due to significant shoulder ROM deficits; elbows/wrists 3/5     Coordination, Tone, Quality of Movement: Tone RUE  RUE Tone: Normotonic  Tone LUE  LUE Tone: Normotonic  Coordination  Movements Are Fluid And Coordinated: No  Coordination and Movement description: Fine motor impairments, Decreased speed, Decreased accuracy, Right UE, Left UE    Hand Dominance:  Hand Dominance  Hand Dominance: Right    ADL Status:  ADL  Feeding: Setup  Grooming: Minimal assistance  UE Bathing:  Moderate assistance  LE Bathing: Moderate assistance  UE Dressing: Moderate assistance  LE Dressing: Maximum assistance  Toileting: Dependent/Total  Additional Comments: Simulated ADLs as above. Pt. with limited UE mobility which she states is baseline and impacts her ability to perform self care. Spouse assists. Pt. incontinent of bowel and requires assist for hygiene. Toilet Transfers  Toilet Transfer: Unable to assess  Toilet Transfers Comments: Anticipate mod A based on other mobility     Therapy key for assistance levels -   Independent = Pt. is able to perform task with no assistance but may require a device   Stand by assistance = Pt. does not perform task at an independent level but does not need physical assistance, requires verbal cues  Minimal, Moderate, Maximal Assistance = Pt. requires physical assistance (25%, 50%, 75% assist from helper) for task but is able to actively participate in task   Dependent = Pt. requires total assistance with task and is not able to actively participate with task completion     Functional Mobility:  Functional Mobility  Functional Mobility Comments: Unable to attempt any steps; pt. too fatigued  Transfers  Sit to stand: Unable to assess  Stand to sit: Unable to assess  Transfer Comments: Unable to attempt    Bed Mobility  Bed mobility  Rolling to Left: Maximum assistance  Rolling to Right: Maximum assistance  Supine to Sit: Maximum assistance(HOB raised)  Comment: Pt. states she feels very fatigued. Willing to attempt; reports discomfort in R shoulder with rolling tasks. Rolled in bed to assist with linen change following bowel movement.      Seated and Standing Balance:  Balance  Sitting Balance: Supervision  Standing Balance: Unable to assess(comment)(Pt. too fatigued to attempt)    Functional Endurance:  Activity Tolerance  Activity Tolerance: Patient Tolerated treatment well    D/C Recommendations:  OT D/C RECOMMENDATIONS  REQUIRES OT FOLLOW UP: Yes    Equipment Recommendations:  OT Equipment Recommendations  Other: Continue to assess    OT Education:   OT Education  OT Education: OT Role, Plan of Care  Patient Education: Educated pt. on role of acute care OT  Barriers to Learning: Mild confusion    OT Follow Up:  OT D/C RECOMMENDATIONS  REQUIRES OT FOLLOW UP: Yes       Assessment/Discharge Disposition:  Assessment: Pt. is an 80year old woman from home with spouse who presents to [facility] with the above deficits which impact her ability to perform ADLs and IADLs. Pt. would benefit from continued OT to maximize independence and safety with ADL tasks.    Performance deficits / Impairments: Decreased functional mobility , Decreased ADL status, Decreased balance, Decreased endurance, Decreased strength, Decreased fine motor control, Decreased coordination, Decreased cognition  Prognosis: Good  Discharge Recommendations: Continue to assess pending progress  Decision Making: Medium Complexity  History: Pt's medical history is moderately complex  Exam: Pt has 8 performance deficits  Assistance / Modification: Pt. requires mod-max A    Six Click Score   How much help for putting on and taking off regular lower body clothing?: Total  How much help for Bathing?: Total  How much help for Toileting?: A Lot  How much help for putting on and taking off regular upper body clothing?: A Lot  How much help for taking care of personal grooming?: A Little  How much help for eating meals?: A Little  AM-[facility] Inpatient Daily Activity Raw Score: 12  AM-PAC Inpatient ADL T-Scale Score : 30.6  ADL Inpatient CMS 0-100% Score: 66.57    Plan:  Plan  Times per week: 1-3x  Plan weeks: Length of acute stay  Current Treatment Recommendations: Strengthening, Balance Training, Functional Mobility Training, Neuromuscular Re-education, Patient/Caregiver Education & Training, Self-Care / ADL, Safety Education & Training    Goals:   Patient will:    - Improve functional endurance to tolerate/complete 60 mins of

## 2020-02-17 NOTE — PROGRESS NOTES
Physical Therapy Med Surg Initial Assessment  Facility/Department: [facility]  [facility]       NAME: [facility]  : 3/3/1933 (49 y.o.)  MRN: [id number]  CODE STATUS: Full Code    Date of Service: 2020    Patient Diagnosis(es): Encephalopathy [G93.40]   Chief Complaint   Patient presents with    Altered Mental Status     onset this week. Pts family states pt usually has these symptoms when she has an UTI.       Patient Active Problem List    Diagnosis Date Noted    Encephalopathy 02/15/2020    Pneumonia 10/15/2019    Delirium     Sepsis secondary to UTI ([facility] 2019    Pneumonia due to organism 2017    Diabetes ([facility] 2017    Actinic keratoses     Dysphagia     SOB (shortness of breath) 04/15/2016    Diaphoresis 04/15/2016    Fatigue     Lipoma     Intertrigo     Arthritis of right shoulder region     Labral tear of shoulder     Tendinopathy of right shoulder     Low back pain 2015    Right arm pain     Osteoarthritis of lumbosacral spine without myelopathy 2014    Anemia 10/06/2014    CKD (chronic kidney disease) stage 3, GFR 30-59 ml/min ([facility]) 2014    Osteoarthritis of spine     Cancer associated pain 2013    Statin intolerance     Chemotherapy-induced neuropathy ([facility])     Recurrent pneumonia 2012    Hypogammaglobulinemia, acquired ([facility] 2012    FH: CAD (coronary artery disease)     Diastolic dysfunction     LVH (left ventricular hypertrophy) 2012    Lower extremity edema 2012    Tremor 2012    Allergic rhinitis 2012    Anxiety 2012    Insomnia 2012    Chronic pain syndrome     Multiple myeloma ([facility]     Asthma     A-fib ([facility]     HTN (hypertension)     GERD (gastroesophageal reflux disease)     COPD (chronic obstructive pulmonary disease) ([facility]     Depression     Hereditary and idiopathic peripheral neuropathy 2009    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 Radha Leon    Chemotherapy-induced neuropathy ([facility])     CHF (congestive heart failure) ([facility])     Chronic pain syndrome     CKD (chronic kidney disease) stage 3, GFR 30-59 ml/min ([facility]) 2014    COPD (chronic obstructive pulmonary disease) ([facility]     Depression     Diabetes ([facility] 2017    Pre-diabetic    Diaphoresis 4/15/2016    Dyslipidemia     unable to tolerate statins/tricor    Dysphagia     Fatigue     FH: CAD (coronary artery disease) 2012    GERD (gastroesophageal reflux disease)     History of DVT of lower extremity     HTN (hypertension)     Hypogammaglobulinemia ([facility] 10/16/2015    Insomnia 2012    Intertrigo     Labral tear of shoulder     right    Lipoma     Low back pain 2015    Lower extremity edema 2012    Multiple myeloma (HCC)     Neuropathy     hands and feet    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 Lori Saxena in [address] [place] [address] SOB (shortness of breath) 4/15/2016    Statin intolerance     Tendinopathy of right shoulder     Tremor 2012    Warfarin anticoagulation      Past Surgical History:   Procedure Laterality Date     SECTION      FOOT SURGERY      HEMORRHOID SURGERY  2014    Dr. Chey Khan Left     ear basal cell    NERVE BLOCK Right 2016    [facility] JOY PALACIOS MD  SUPRASCAPULAR NB PULSED RF    OTHER SURGICAL HISTORY  14    [facility] NERVE BLOCK MEDIAN BRANCH    VENA CAVA FILTER PLACEMENT         Chart Reviewed: Yes  Patient assessed for rehabilitation services?: Yes  Family / Dynamic: Good;-             Bed mobility  Rolling to Left: Maximum assistance  Rolling to Right: Maximum assistance  Supine to Sit: Maximum assistance(HOB raised)  Comment: pt verbalizes she believes she is having a bowel movement, then assisted with rolling and linen change; pt assisted but difficulty using RUE due to painful shoulder from a fall a few years ago per pt report    Transfers  Sit to Stand: Unable to assess(unable to stand due to fatigue/weakness; however, sat EOB about 10 minutes participating in mostly static tasks, good postural control)                   Activity Tolerance  Activity Tolerance: Patient Tolerated treatment well;Patient limited by fatigue          PT Education  PT Education: Goals;PT Role;Plan of Care  Patient Education: importance of OOB activity    ASSESSMENT:   Body structures, Functions, Activity limitations: Decreased functional mobility ; Decreased balance;Decreased endurance;Decreased strength;Decreased cognition; Increased pain  Decision Making: Medium Complexity  History: high  Exam: high  Clinical Presentation: medium    Prognosis: Good  Patient Education: importance of OOB activity    DISCHARGE RECOMMENDATIONS:  Discharge Recommendations: Continue to assess pending progress, Patient would benefit from continued therapy after discharge    Assessment: Pt with above impairments, unable to successfully stand or ambulate this a.m. Previously was ambulatory within home per pt report. Continue PT to progress mobility as tolerated.   REQUIRES PT FOLLOW UP: Yes      PLAN OF CARE:  Plan  Times per week: 3-6  Current Treatment Recommendations: Strengthening, Transfer Training, Endurance Training, Neuromuscular Re-education, Balance Training, Gait Training, Home Exercise Program, Functional Mobility Training, Safety Education & Training, Pain Management, Equipment Evaluation, Education, & procurement  Safety Devices  Type of devices: Call light within reach, Chair alarm in place, Nurse notified, Tania Chavez in use    Goals:  Patient goals : to go home  Short term goals  Short term goal 1: roll side to side with SBA  Short term goal 2: participate in >10 min dynamic activity  Long term goals  Long term goal 1: pt to be min A with bed mobility  Long term goal 2: pt to be min A with transfers  Long term goal 3: pt to ambulate >15 ft with min A LRAD    AMPAC (6 CLICK) BASIC MOBILITY  AM-PAC Inpatient Mobility Raw Score : 9     Therapy Time:   Individual   Time In 0929   Time Out 0956   Minutes 27       Bed mob/transfers: 13 min    Paula Kidd, PT, 02/17/20 at 10:21 AM

## 2020-02-17 NOTE — PROGRESS NOTES
Assumed care of pt this morning, pt is a/ox1-2 at times. Turned Q2 hours to protect skin integrity. Pt was drowsy, but awake. Pt ate breakfast and took meds po. Was able to tell me that she was at [facility] in [facility]. Her mental status/awareness seems to be improving. Call light within reach, Avasys in place for safety. Pt is going down for xray of right shoulder, pt has been complaining of pain anytime she is moved, has limited movement and h/o recent injury. PT/OT was in to work with pt. An order for a Berta Yrn lift is being ordered for home to assist family.

## 2020-02-17 NOTE — TELEPHONE ENCOUNTER
Felipa from [facility] calling to let you know that this pt went to [address] [facility] on 2/15 and is still admitted. FYI!

## 2020-02-18 NOTE — DISCHARGE INSTR - COC
Continuity of Care Form    Patient Name: Sherrell Jones   :  3/3/1933  MRN:  [id number]    [facility] date:  2/15/2020  Discharge date:  ***    Code Status Order: Full Code   Advance Directives:   Advance Care Flowsheet Documentation     Date/Time Healthcare Directive Type of Healthcare Directive Copy in [address] Agent's Name Healthcare Agent's Phone Number    [phone]  No, patient does not have an advance directive for healthcare treatment -- -- -- -- --    02/15/20 2027  No, patient does not have an advance directive for healthcare treatment -- -- -- -- --          Admitting Physician:  Juany Gonsalves DO  PCP: Windy Bradford MD    Discharging Nurse: [facility] Unit/Room#: I586/J734-50  Discharging Unit Phone Number: ***    Emergency Contact:   Extended Emergency Contact Information  Primary Emergency Contact: Lavetta Fleischer [address] Phone: [phone]  Mobile Phone: [phone]  Relation: Spouse  Secondary Emergency Contact: Marlys Amborse  Address:  [address]   [address] [address] Phone: [phone]  Relation: Child    Past Surgical History:  Past Surgical History:   Procedure Laterality Date     SECTION      FOOT SURGERY      HEMORRHOID SURGERY      Dr. Clarke Headings Left     ear basal cell    NERVE BLOCK Right 2016    [facility] JOY PALACIOS MD  SUPRASCAPULAR NB PULSED RF    OTHER SURGICAL HISTORY  14    [facility] NERVE BLOCK MEDIAN BRANCH    VENA CAVA FILTER PLACEMENT         Immunization History:   Immunization History   Administered Date(s) Administered    Influenza A (Y5A2-45) Vaccine IM 2009    Influenza Vaccine, unspecified formulation 2009, 2010, 2013, 2014, 10/16/2015, 10/14/2016    Influenza Whole 10/25/2012    Influenza, High Dose (Fluzone 65 yrs and older) 10/16/2015, 10/12/2016, 10/25/2017, 2018, 2019    Pneumococcal Conjugate 13-valent (Soxerqp90) 04/13/2017    Pneumococcal Conjugate 7-valent (Prevnar7) 04/15/1998    Pneumococcal Polysaccharide (Dubzonxup60) 05/03/2005, 06/02/2006, 03/20/2007    Td, unspecified formulation 12/09/2002       Active Problems:  Patient Active Problem List   Diagnosis Code    Multiple myeloma ([facility] C90.00    Asthma J45.909    A-fib ([facility] I48.91    HTN (hypertension) I10    GERD (gastroesophageal reflux disease) K21.9    COPD (chronic obstructive pulmonary disease) ([facility] J44.9    Depression F32.9    Chronic pain syndrome G89.4    Lower extremity edema R60.0    Tremor R25.1    Allergic rhinitis J30.9    Anxiety F41.9    Insomnia G47.00    FH: CAD (coronary artery disease) Y19.00    Diastolic dysfunction M06.92    LVH (left ventricular hypertrophy) I51.7    Recurrent pneumonia J18.9    Hypogammaglobulinemia, acquired ([facility]) D80.1    Statin intolerance Z78.9    Chemotherapy-induced neuropathy ([facility]) G62.0, T45.1X5A    Cancer associated pain G89.3    Osteoarthritis of spine M47.9    CKD (chronic kidney disease) stage 3, GFR 30-59 ml/min ([facility]) N18.3    Anemia D64.9    Right arm pain M79.601    Arthritis of right shoulder region M19.011    Labral tear of shoulder S43.439A    Tendinopathy of right shoulder M67.911    Intertrigo L30.4    Lipoma D17.9    Low back pain M54.5    Osteoarthritis of lumbosacral spine without myelopathy M47.817    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[facility]) D89.89    Hereditary and idiopathic peripheral neuropathy G60.9    Fatigue R53.83    SOB (shortness of breath) R06.02    Diaphoresis R61    Dysphagia R13.10    Actinic keratoses L57.0    Diabetes ([facility]) E11.9    Pneumonia due to organism J18.9    Sepsis secondary to UTI ([facility]) A41.9, N39.0    Delirium R41.0    Pneumonia J18.9    Encephalopathy G93.40       Isolation/Infection:   Isolation          No Isolation        Patient Infection Status     None to display          Nurse Assessment:  Last Vital Signs: BP (!) 159/95   Pulse 79   Temp 97.7 °F (36.5 °C) (Oral)   Resp 20   Ht 5' 5\" (1.651 m)   Wt 174 lb (78.9 kg)   LMP  (LMP Unknown)   SpO2 100%   BMI 28.96 kg/m²     Last documented pain score (0-10 scale): Pain Level: 0  Last Weight:   Wt Readings from Last 1 Encounters:   02/15/20 174 lb (78.9 kg)     Mental Status:  {IP PT MENTAL STATUS:20030}    IV Access:  { HARSHAD IV ACCESS:894192402}    Nursing Mobility/ADLs:  Walking   {CHP DME ECQC:820310058}  Transfer  {CHP DME HBAQ:437223438}  Bathing  {CHP DME VWQH:960190319}  Dressing  {CHP DME TMMA:252138408}  Toileting  {CHP DME FOTM:022704492}  Feeding  {CHP DME WBIN:141039195}  Med Admin  {CHP DME QONX:943699858}  Med Delivery   { HARSHAD MED Delivery:332876975}    Wound Care Documentation and Therapy:        Elimination:  Continence:   · Bowel: {YES / HE:08220}  · Bladder: {YES / UT:29470}  Urinary Catheter: {Urinary Catheter:176236580}   Colostomy/Ileostomy/Ileal Conduit: {YES / FS:64803}       Date of Last BM: ***    Intake/Output Summary (Last 24 hours) at 2/18/2020 1151  Last data filed at 2/18/2020 0835  Gross per 24 hour   Intake 1450 ml   Output 1750 ml   Net -300 ml     I/O last 3 completed shifts: In: 1210 [P.O.:1200;  I.V.:10]  Out: 1750 [Urine:1750]    Safety Concerns:     [address] Safety Concerns:689697097}    Impairments/Disabilities:      { HARSHAD Impairments/Disabilities:736167835}    Nutrition Therapy:  Current Nutrition Therapy:   [address] [employer] HARSHAD Diet List:041775914}    Routes of Feeding: {CHP DME Other Feedings:815460098}  Liquids: {Slp liquid thickness:61962}  Daily Fluid Restriction: {CHP DME Yes amt example:651280274}  Last Modified Barium Swallow with Video (Video Swallowing Test): {Done Not Done [facility]:543445620}    Treatments at the Time of Hospital Discharge:   Respiratory Treatments: ***  Oxygen Therapy:  {Therapy; copd oxygen:73858}  Ventilator:    {MH CC Vent ZOKN:856332180}    Rehab Therapies: {THERAPEUTIC INTERVENTION:0301388989}  Weight Bearing Status/Restrictions: [address] Kia Brewer CC Weight Bearin}  Other Medical Equipment (for information only, NOT a DME order):  {EQUIPMENT:050955950}  Other Treatments: ***    Patient's personal belongings (please select all that are sent with patient):  {CHP DME Belongings:862593533}    RN SIGNATURE:  {Esignature:743764525}    CASE MANAGEMENT/SOCIAL WORK SECTION    OBS: LOS 3 DAYS    Readmission Risk Assessment Score:  Readmission Risk              Risk of Unplanned Readmission:        23           Discharging to Facility/ Agency   · Name: Mariella (PT/OT/SN/SW)  · [facility] [address]  · Phone:[phone]          / signature: Electronically signed by Chip Pickens RN on 20 at 11:51 AM    PHYSICIAN SECTION    Prognosis: {Prognosis:6869752212}    Condition at Discharge: 50Freddy Brewer Patient Condition:580141335}    Rehab Potential (if transferring to Rehab): {Prognosis:3443199080}    Recommended Labs or Other Treatments After Discharge: ***    Physician Certification: I certify the above information and transfer of Pushpa Tapia  is necessary for the continuing treatment of the diagnosis listed and that she requires {Admit to Appropriate Level of Care:11791} for {GREATER/LESS:705360044} 30 days.      Update Admission H&P: {CHP DME Changes in EEIUP:469843751}    PHYSICIAN SIGNATURE:  {Esignature:259420358}

## 2020-02-18 NOTE — DISCHARGE SUMMARY
Hospital Medicine Discharge Summary    Jolynn Molina  :  3/3/1933  MRN:  [id number]    Admit date:  2/15/2020  Discharge date:  2020    Admitting Physician:  Deborah Mary DO  Primary Care Physician:  Antony Myrick MD      Discharge Diagnoses:        Hospital Course:   Patient is an 68-year-old female with past medical history of multiple myeloma currently receiving chemo, hypertension, COPD on 3 L nasal cannula continuously, atrial fibrillation on Xarelto, diastolic dysfunction, CKD, anemia and type 2 diabetes. Patient presented to the emergency room with altered mental status, progressively worsening over the last 3 days.  who is primary caretaker states that patient has had confusion and delirium in the past when UTI is present. Current work-up is negative for urinary tract infection or pneumonia. Head CT shows right side mastoiditis. Unable to obtain review of systems due to patient's mental status. She is alert to self,  states patient is normally alert and oriented x3. Patient is seen weekly by palliative care medicine who also notes that patient is growing increasingly weak.  states that patient just recently had IVIG and believes that this is the reason she is confused. Patient is also noted to be on 2 mg of Dilaudid every 6 hours as needed as well as 50 mcg fentanyl patch.  however does not believe that high doses of these narcotics could be the culprit of her confusion and delirium. Patient is hemodynamically stable and afebrile, no distress presented. Patient was admitted and pain medications were stopped, patient was observed in the hospital for 24 to 48 hours after the pain medications were stopped and patient was alert and oriented x3, CT scan of the head was negative, electrolytes were within normal limits, UA was negative for any UTIs.   Physical therapy saw the patient and recommended SNF which we all agree with however the  declined INHALATIONS TWICE A DAY             XARELTO 20 MG TABS tablet  TAKE 1 TABLET DAILY WITH BREAKFAST. (PATIENT NEEDS A FOLLOW UP APPOINTMENT WITH DR. Johnathan Wells)                 Disposition:   Discharged to Home. Any [facility] needs that were indicated and/or required as been addressed and set up by Social Work. Condition at discharge: Pt was medically stable at the time of discharge. Significant improvement in clinical condition compared to initial condition at presentation to hospital    Activity: activity as tolerated, fall precautions. Total time taken for discharging this patient: 40 minutes. Greater than 70% of time was spent focused exclusively on this patient. Time was taken to review chart, discuss plans with consultants, reconciling medications, discussing plan answering questions with patient. Signed:  Jovita Mendoza  2/18/2020, 11:01 AM  ----------------------------------------------------------------------------------------------------------------------    Sarah Figueroa,     Please return to ER or call 911 if you develop any significant signs or symptoms. I may not have addressed all of your medical illnesses or the abnormal blood work or imaging therefore please ask your PCP, Latha Diehl MD ,  to obtain [facility] record to follow up on all of the abnormal labs, imaging and findings that I have and have not addressed during your hospitalization. Discharging you from the hospital does not mean that your medical care ends here and now. You may still need additional work up, investigation, monitoring, and treatment to be handled from this point on by outside providers including your PCP, Latha Diehl MD , Specialists and other healthcare providers. Please review your list of discharge medications prior to resuming medications you might still have at home, as the medications you need to be taking, dosages or how often you must take them may have changed.  For medication questions,

## 2020-02-18 NOTE — CONSULTS
Neck:      Musculoskeletal: Normal range of motion and neck supple. Thyroid: No thyromegaly. Vascular: No JVD. Trachea: No tracheal deviation. Cardiovascular:      Rate and Rhythm: Normal rate and regular rhythm. Heart sounds: Normal heart sounds. Pulmonary:      Effort: Pulmonary effort is normal. No respiratory distress. Breath sounds: Normal breath sounds. No stridor. No wheezing or rales. Chest:      Chest wall: No tenderness. Abdominal:      General: Bowel sounds are normal. There is no distension. Palpations: Abdomen is soft. There is no mass. Tenderness: There is no abdominal tenderness. There is no guarding or rebound. Musculoskeletal: Normal range of motion. General: No tenderness or deformity. Lymphadenopathy:      Cervical: No cervical adenopathy. Skin:     General: Skin is warm and dry. Capillary Refill: Capillary refill takes less than 2 seconds. Findings: No erythema or rash. Neurological:      Mental Status: She is alert and oriented to person, place, and time. Psychiatric:         Behavior: Behavior normal.         Thought Content: Thought content normal.         Allergies:       Allergies   Allergen Reactions    Bactrim      Trouble sleeping    Doxycycline      vomiting    Fenofibrate     Fenofibrate     Fenofibrate Micronized      muscle aches    Lipitor     Statins      LIPITOR: MUSCLE WEAKNESS AND PAIN    Sulfamethoxazole     Sulfamethoxazole-Trimethoprim     Trimethoprim     Levofloxacin Nausea And Vomiting     hyper    Penicillins Swelling and Rash     TONGUE       Medications:      Current Facility-Administered Medications   Medication Dose Route Frequency Provider Last Rate Last Dose    rivaroxaban (XARELTO) tablet 20 mg  20 mg Oral Daily TALI Ruiz CNP   20 mg at 02/17/20 1742    diltiazem (CARDIZEM CD) extended release capsule 360 mg  360 mg Oral Daily TALI Ruiz CNP   360 mg at Oral PRN TALI Chamberlain CNP        dextrose 50 % IV solution  12.5 g Intravenous PRN TALI Chamberlain CNP        glucagon (rDNA) injection 1 mg  1 mg Intramuscular PRN TALI Chamberlain CNP        dextrose 5 % solution  100 mL/hr Intravenous PRN TALI Chamberlain CNP        insulin lispro (HUMALOG) injection vial 0-6 Units  0-6 Units Subcutaneous TID WC TALI Chamberlain CNP   Stopped at 02/17/20 0823    insulin lispro (HUMALOG) injection vial 0-3 Units  0-3 Units Subcutaneous Nightly TALI Chamberlain CNP        lenalidomide (REVLIMID) chemo capsule 25 mg  25 mg Oral Daily TALI Chamberlain CNP   Stopped at 02/18/20 4223       History:       PMHx:  Past Medical History:   Diagnosis Date    [facility]fib [facility]     Actinic keratoses     Allergic rhinitis, cause unspecified 4/12/2012    Anxiety 4/12/2012    Arthritis of right shoulder region     Asthma     Candida esophagitis ([facility])     GI-Dr. Don Hsu    Chemotherapy-induced neuropathy ([facility])     CHF (congestive heart failure) ([facility])     Chronic pain syndrome     CKD (chronic kidney disease) stage 3, GFR 30-59 ml/min ([facility]) 9/29/2014    COPD [facility] obstructive [facility] disease) [facility]     Depression     Diabetes ([facility] 08/2017    Pre-diabetic    Diaphoresis 4/15/2016    Dyslipidemia     unable to tolerate statins/tricor    Dysphagia     Fatigue     FH: CAD (coronary artery disease) 5/23/2012    GERD (gastroesophageal reflux disease)     History of DVT of lower extremity     HTN (hypertension)     Hypogammaglobulinemia ([facility]) 10/16/2015    Insomnia 4/12/2012    Intertrigo     Labral tear of shoulder     right    Lipoma     Low back pain 1/7/2015    Lower extremity edema 4/12/2012    Multiple myeloma ([facility])     Neuropathy     hands and feet    Osteoarthritis of lumbosacral spine without myelopathy     Osteoarthritis of spine     PE (pulmonary embolism)     Right arm pain     Seizures ([facility])     Skin cancer, Family Hx:  Family History   Problem Relation Age of Onset    Cancer Mother     Kidney Disease Mother     Stroke Mother     Cancer Father     Arthritis Sister     Cancer Sister     Diabetes Sister     Arthritis Brother     Cancer Brother     Diabetes Brother     Heart Disease Brother         multiple stents       LABS: Reviewed     CBC:  Lab Results   Component Value Date    WBC 7.1 02/18/2020    RBC 5.45 02/18/2020    RBC 4.77 06/06/2012    HGB 14.5 02/18/2020    HCT 46.4 02/18/2020    MCV 85.2 02/18/2020    MCH 26.7 02/18/2020    MCHC 31.3 02/18/2020    RDW 18.3 02/18/2020     02/18/2020    MPV 8.9 05/10/2015     CBC with Differential:   Lab Results   Component Value Date    WBC 7.1 02/18/2020    RBC 5.45 02/18/2020    RBC 4.77 06/06/2012    HGB 14.5 02/18/2020    HCT 46.4 02/18/2020     02/18/2020    MCV 85.2 02/18/2020    MCH 26.7 02/18/2020    MCHC 31.3 02/18/2020    RDW 18.3 02/18/2020    LYMPHOPCT 23.4 02/15/2020    MONOPCT 11.0 02/15/2020    EOSPCT 2.1 03/27/2012    BASOPCT 3.7 02/15/2020    MONOSABS 0.8 02/15/2020    LYMPHSABS 1.7 02/15/2020    EOSABS 0.0 02/15/2020    BASOSABS 0.3 02/15/2020     CMP:    Lab Results   Component Value Date     02/18/2020    K 3.3 02/18/2020    K 4.5 02/15/2020     02/18/2020    CO2 25 02/18/2020    BUN 9 02/18/2020    CREATININE 0.54 02/18/2020    GFRAA >60.0 02/18/2020    LABGLOM >60.0 02/18/2020    GLUCOSE 121 02/18/2020    GLUCOSE 112 06/06/2012    PROT 6.7 02/15/2020    LABALBU 3.3 02/15/2020    LABALBU 3.6 06/06/2012    CALCIUM 9.5 02/18/2020    BILITOT 0.7 02/15/2020    ALKPHOS 86 02/15/2020    AST 17 02/15/2020    ALT 8 02/15/2020     BMP:    Lab Results   Component Value Date     02/18/2020    K 3.3 02/18/2020    K 4.5 02/15/2020     02/18/2020    CO2 25 02/18/2020    BUN 9 02/18/2020    LABALBU 3.3 02/15/2020    LABALBU 3.6 06/06/2012    CREATININE 0.54 02/18/2020    CALCIUM 9.5 02/18/2020    GFRAA >60.0 Planning  Discussed goals of care with patient. Explained in extensive detail nuances between full code, DNR CCA and DNR CC. Patient has made the decision to be DNR CCA      -Goals of Care Discussion:  Disease process and goals of treatment were discussed in basic terms. Roseline's goal is to optimize available comfort care measures and maximize function. We discussed the palliative care philosophy in light of those goals. We discussed all care options contingent on treatment response and QOL. Much active listening, presence, and emotional support were given.          Electronically signed by TALI Peterson CNP on 2/18/2020 at 12:32 PM

## 2020-02-18 NOTE — PROGRESS NOTES
Physical Therapy  Facility/Department: [facility] A623/D968-07  Physical Therapy Discharge      NAME: Gautam Humphries    : 3/3/1933 (80 y.o.)  MRN: [id number]    Account: [de-identified]  Gender: female      Patient has been discharged from acute care hospital. DC patient from current PT program.      Electronically signed by Ivana Domingo, PT on 20 at 2:33 PM

## 2020-02-18 NOTE — PROGRESS NOTES
CP: Antony Myrick MD    Date of Admission: 2/15/2020    Subjective: :  Pt was seen and examined this morning, pt was in bed in no acute distress,  Patient is almost at baseline, alert and answering questions appropriately. Still complaining of the shoulder pain. Medications:  Reviewed    Infusion Medications    dextrose       Scheduled Medications    rivaroxaban  20 mg Oral Daily    diltiazem  360 mg Oral Daily    fluticasone  1 spray Each Nostril Daily    furosemide  40 mg Oral Daily    guaiFENesin  1,200 mg Oral BID    nystatin  500,000 Units Oral 4x Daily    potassium chloride  20 mEq Oral Daily with breakfast    budesonide-formoterol  2 puff Inhalation BID    primidone  50 mg Oral Nightly    metoprolol tartrate  25 mg Oral BID    pantoprazole  40 mg Oral QAM AC    sodium chloride flush  10 mL Intravenous 2 times per day    acetaminophen  1,000 mg Oral Q8H    melatonin  2 mg Oral Nightly    insulin lispro  0-6 Units Subcutaneous TID WC    insulin lispro  0-3 Units Subcutaneous Nightly    lenalidomide  25 mg Oral Daily     PRN Meds: sodium chloride flush, ondansetron, senna, acetaminophen, glucose, dextrose, glucagon (rDNA), dextrose      Intake/Output Summary (Last 24 hours) at 2/18/2020 1042  Last data filed at 2/18/2020 0835  Gross per 24 hour   Intake 1450 ml   Output 1750 ml   Net -300 ml       Exam:    BP (!) 159/95   Pulse 79   Temp 97.7 °F (36.5 °C) (Oral)   Resp 20   Ht 5' 5\" (1.651 m)   Wt 174 lb (78.9 kg)   LMP  (LMP Unknown)   SpO2 100%   BMI 28.96 kg/m²     General appearance: No apparent distress, appears stated age and cooperative. HEENT: Pupils equal, round, and reactive to light. Conjunctivae/corneas clear. Neck: Supple, with full range of motion. No jugular venous distention. Trachea midline. Respiratory:  Normal respiratory effort. Clear to auscultation, bilaterally without Rales/Wheezes/Rhonchi.   Cardiovascular: Regular rate and rhythm with normal S1/S2 without murmurs, rubs or gallops. Abdomen: Soft, non-tender, non-distended with normal bowel sounds. Musculoskeletal: No clubbing, cyanosis or edema bilaterally. Full range of motion without deformity. Skin: Skin color, texture, turgor normal.  No rashes or lesions. Neuro: Non Focal. Symetrical motor and tone. Nl Comprehension, sleepy and drowsy reflexes. Psychiatric: Alert and oriented, thought content appropriate, normal insight  Capillary Refill: Brisk,< 3 seconds   Peripheral Pulses: +2 palpable, equal bilaterally       Labs:   Recent Labs     02/15/20  1949 02/17/20  0716 02/18/20  0642   WBC 7.1 9.0 7.1   HGB 14.0 14.9 14.5   HCT 42.8 46.1 46.4    284 271     Recent Labs     02/15/20  1949 02/17/20  0716 02/18/20  0642    139 141   K 4.5 3.5 3.3*    102 103   CO2 22 25 25   BUN 6* 8 9   CREATININE 0.50 0.62 0.54   CALCIUM 9.1 9.2 9.5     Recent Labs     02/15/20  1312 02/15/20  1949   AST 14 17   ALT 6 8   BILITOT 0.8* 0.7   ALKPHOS 92 86     Recent Labs     02/15/20  1312   INR 1.2     No results for input(s): CKTOTAL, TROPONINI in the last 72 hours. Urinalysis:      Lab Results   Component Value Date    NITRU Negative 02/15/2020    WBCUA 0-2 02/15/2020    BACTERIA Negative 02/15/2020    RBCUA 0-2 02/15/2020    BLOODU Negative 02/15/2020    SPECGRAV 1.021 02/15/2020    GLUCOSEU Negative 02/15/2020    GLUCOSEU NEG 03/04/2012       Radiology:  XR SHOULDER RIGHT (MIN 2 VIEWS)   Final Result   Impression:     1. No acute fracture definitively identified. 2. Severe right glenohumeral osteoarthritis with bony remodeling as described above. 3. Osteopenia. CT Head WO Contrast   Final Result      No acute intracranial process. Nonspecific opacification of the right mastoid air cells and right middle ear. Correlation for otomastoiditis recommended.          All CT scans at this facility use dose modulation, iterative reconstruction, and/or weight based dosing when appropriate are handled by other specialists. Additional work up and treatment should be done in out pt setting by pt PCP and other out pt providers. In addition to examining and evaluating pt, I spent additional time explaining care, normal and abnormal findings, and treatment plan. All of pt questions were answered. Counseling, diet and education were  provided. Case will be discussed with nursing staff when appropriate. Family will be updated if and when appropriate.       Diet: DIET CARB CONTROL;    Code Status: Full Code    PT/OT Eval           Electronically signed by Muna Duenas MD on 2/18/2020 at 10:42 AM

## 2020-02-18 NOTE — PROGRESS NOTES
Assessment documented. A&O*4 this shift. Medication per MAR in pudding. Tolerated well. 1 BM loose and brown. repositioned and brief changed. Denies N/V. No needs at this time. Will continue to monitor.  Electronically signed by Marty Childers RN on 2/17/2020 at 11:07 PM

## 2020-02-18 NOTE — PLAN OF CARE
Problem: Falls - Risk of:  Goal: Will remain free from falls  Description  Will remain free from falls  Outcome: Ongoing  Goal: Absence of physical injury  Description  Absence of physical injury  Outcome: Ongoing     Problem: Risk for Impaired Skin Integrity  Goal: Tissue integrity - skin and mucous membranes  Description  Structural intactness and normal physiological function of skin and  mucous membranes. Outcome: Ongoing     Problem: Coping:  Goal: Ability to remain calm will improve  Description  Ability to remain calm will improve  Outcome: Ongoing     Problem: Safety:  Goal: Ability to remain free from injury will improve  Description  Ability to remain free from injury will improve  Outcome: Ongoing     Problem: Self-Care:  Goal: Ability to participate in self-care as condition permits will improve  Description  Ability to participate in self-care as condition permits will improve  Outcome: Ongoing     Problem: Mobility - Impaired:  Goal: Mobility will improve  Description  Therapy evaluation completed. Please see daily notes and/or progress notes for details related to planned treatment interventions, goals and functional abilities.      Outcome: Ongoing     Problem: IP BALANCE  Goal: LTG - PATIENT WILL MAINTAIN SITTING POSITION WITH APPROPRIATE MID-LINE ORIENTATION  2/17/2020 2343 by Leta Quispe RN  Outcome: Ongoing  2/17/2020 1027 by Tho Jones OTR/L  Outcome: Ongoing

## 2020-02-25 NOTE — TELEPHONE ENCOUNTER
Verbal order given for pt to be evaluated for hospice Care - Also stated that palliative care has been following with patients cancer and they would need to receive records from them.      FYI

## 2020-02-25 NOTE — TELEPHONE ENCOUNTER
Hospice calling    Spouse called hospice wanting information on hospice services  Pt has stopped chemo  And will stop infusions    Pt is increased in the amount of pain she is having, pt spouse is trying to get a hospice nurse for her. If a Hospice consult order could be placed. And records on pts cancer be sent to hospice.

## 2020-05-09 RX ORDER — RIVAROXABAN 20 MG/1
TABLET, FILM COATED ORAL
Qty: 90 TABLET | Refills: 3 | OUTPATIENT
Start: 2020-05-09